# Patient Record
Sex: FEMALE | Race: WHITE | NOT HISPANIC OR LATINO | Employment: OTHER | URBAN - METROPOLITAN AREA
[De-identification: names, ages, dates, MRNs, and addresses within clinical notes are randomized per-mention and may not be internally consistent; named-entity substitution may affect disease eponyms.]

---

## 2020-05-07 ENCOUNTER — HOSPITAL ENCOUNTER (INPATIENT)
Facility: MEDICAL CENTER | Age: 80
LOS: 5 days | DRG: 247 | End: 2020-05-12
Attending: INTERNAL MEDICINE | Admitting: INTERNAL MEDICINE
Payer: MEDICARE

## 2020-05-07 ENCOUNTER — HOSPITAL ENCOUNTER (OUTPATIENT)
Facility: MEDICAL CENTER | Age: 80
DRG: 247 | End: 2020-05-07
Payer: MEDICARE

## 2020-05-07 DIAGNOSIS — I24.9 ACS (ACUTE CORONARY SYNDROME) (HCC): ICD-10-CM

## 2020-05-07 DIAGNOSIS — I10 ESSENTIAL HYPERTENSION: ICD-10-CM

## 2020-05-07 PROBLEM — E03.9 ACQUIRED HYPOTHYROIDISM: Status: ACTIVE | Noted: 2020-05-07

## 2020-05-07 PROCEDURE — 99223 1ST HOSP IP/OBS HIGH 75: CPT | Mod: AI | Performed by: HOSPITALIST

## 2020-05-07 PROCEDURE — 99233 SBSQ HOSP IP/OBS HIGH 50: CPT | Performed by: INTERNAL MEDICINE

## 2020-05-07 PROCEDURE — 700102 HCHG RX REV CODE 250 W/ 637 OVERRIDE(OP): Performed by: HOSPITALIST

## 2020-05-07 PROCEDURE — A9270 NON-COVERED ITEM OR SERVICE: HCPCS | Performed by: HOSPITALIST

## 2020-05-07 PROCEDURE — 770020 HCHG ROOM/CARE - TELE (206)

## 2020-05-07 RX ORDER — OXYCODONE HYDROCHLORIDE 5 MG/1
5-10 TABLET ORAL EVERY 4 HOURS PRN
Status: DISCONTINUED | OUTPATIENT
Start: 2020-05-07 | End: 2020-05-12 | Stop reason: HOSPADM

## 2020-05-07 RX ORDER — CARVEDILOL 3.12 MG/1
3.12 TABLET ORAL 2 TIMES DAILY WITH MEALS
Status: DISCONTINUED | OUTPATIENT
Start: 2020-05-07 | End: 2020-05-08

## 2020-05-07 RX ORDER — ATORVASTATIN CALCIUM 80 MG/1
80 TABLET, FILM COATED ORAL NIGHTLY
Status: DISCONTINUED | OUTPATIENT
Start: 2020-05-07 | End: 2020-05-12 | Stop reason: HOSPADM

## 2020-05-07 RX ORDER — BISACODYL 10 MG
10 SUPPOSITORY, RECTAL RECTAL
Status: DISCONTINUED | OUTPATIENT
Start: 2020-05-07 | End: 2020-05-12 | Stop reason: HOSPADM

## 2020-05-07 RX ORDER — ACETAMINOPHEN 325 MG/1
650 TABLET ORAL EVERY 6 HOURS PRN
Status: DISCONTINUED | OUTPATIENT
Start: 2020-05-07 | End: 2020-05-07

## 2020-05-07 RX ORDER — CLONIDINE HYDROCHLORIDE 0.1 MG/1
0.1 TABLET ORAL 2 TIMES DAILY
Status: ON HOLD | COMMUNITY
End: 2020-05-09

## 2020-05-07 RX ORDER — ASPIRIN 325 MG
325 TABLET ORAL DAILY
Status: DISCONTINUED | OUTPATIENT
Start: 2020-05-08 | End: 2020-05-07

## 2020-05-07 RX ORDER — AMOXICILLIN 250 MG
2 CAPSULE ORAL 2 TIMES DAILY
Status: DISCONTINUED | OUTPATIENT
Start: 2020-05-07 | End: 2020-05-12 | Stop reason: HOSPADM

## 2020-05-07 RX ORDER — CLOPIDOGREL BISULFATE 75 MG/1
75 TABLET ORAL DAILY
Status: DISCONTINUED | OUTPATIENT
Start: 2020-05-08 | End: 2020-05-08

## 2020-05-07 RX ORDER — ACETAMINOPHEN 325 MG/1
650 TABLET ORAL EVERY 6 HOURS PRN
Status: DISCONTINUED | OUTPATIENT
Start: 2020-05-07 | End: 2020-05-12 | Stop reason: HOSPADM

## 2020-05-07 RX ORDER — POLYETHYLENE GLYCOL 3350 17 G/17G
1 POWDER, FOR SOLUTION ORAL
Status: DISCONTINUED | OUTPATIENT
Start: 2020-05-07 | End: 2020-05-12 | Stop reason: HOSPADM

## 2020-05-07 RX ORDER — LEVOTHYROXINE SODIUM 0.07 MG/1
75 TABLET ORAL
Status: DISCONTINUED | OUTPATIENT
Start: 2020-05-08 | End: 2020-05-12 | Stop reason: HOSPADM

## 2020-05-07 RX ORDER — ACETAMINOPHEN 325 MG/1
650 TABLET ORAL EVERY 6 HOURS PRN
Status: CANCELLED | OUTPATIENT
Start: 2020-05-07

## 2020-05-07 RX ADMIN — CARVEDILOL 3.12 MG: 3.12 TABLET, FILM COATED ORAL at 17:57

## 2020-05-07 RX ADMIN — ATORVASTATIN CALCIUM 80 MG: 80 TABLET, FILM COATED ORAL at 19:37

## 2020-05-07 ASSESSMENT — COGNITIVE AND FUNCTIONAL STATUS - GENERAL
DRESSING REGULAR LOWER BODY CLOTHING: A LOT
STANDING UP FROM CHAIR USING ARMS: A LOT
SUGGESTED CMS G CODE MODIFIER MOBILITY: CL
HELP NEEDED FOR BATHING: A LOT
SUGGESTED CMS G CODE MODIFIER DAILY ACTIVITY: CL
TURNING FROM BACK TO SIDE WHILE IN FLAT BAD: A LOT
CLIMB 3 TO 5 STEPS WITH RAILING: A LOT
DRESSING REGULAR UPPER BODY CLOTHING: A LOT
TOILETING: A LOT
PERSONAL GROOMING: A LOT
MOVING TO AND FROM BED TO CHAIR: A LOT
MOVING FROM LYING ON BACK TO SITTING ON SIDE OF FLAT BED: A LOT
WALKING IN HOSPITAL ROOM: A LOT
DAILY ACTIVITIY SCORE: 12
MOBILITY SCORE: 12
EATING MEALS: A LOT

## 2020-05-07 ASSESSMENT — LIFESTYLE VARIABLES
EVER_SMOKED: NEVER
EVER HAD A DRINK FIRST THING IN THE MORNING TO STEADY YOUR NERVES TO GET RID OF A HANGOVER: NO
EVER FELT BAD OR GUILTY ABOUT YOUR DRINKING: NO
HAVE YOU EVER FELT YOU SHOULD CUT DOWN ON YOUR DRINKING: NO
TOTAL SCORE: 0
HAVE PEOPLE ANNOYED YOU BY CRITICIZING YOUR DRINKING: NO
TOTAL SCORE: 0
AVERAGE NUMBER OF DAYS PER WEEK YOU HAVE A DRINK CONTAINING ALCOHOL: 0
ALCOHOL_USE: NO
HOW MANY TIMES IN THE PAST YEAR HAVE YOU HAD 5 OR MORE DRINKS IN A DAY: 0
ON A TYPICAL DAY WHEN YOU DRINK ALCOHOL HOW MANY DRINKS DO YOU HAVE: 0
CONSUMPTION TOTAL: NEGATIVE
DOES PATIENT WANT TO STOP DRINKING: NO
TOTAL SCORE: 0

## 2020-05-07 ASSESSMENT — ENCOUNTER SYMPTOMS
HEADACHES: 0
PALPITATIONS: 0
VOMITING: 0
GASTROINTESTINAL NEGATIVE: 1
CHILLS: 0
COUGH: 0
NAUSEA: 0
ABDOMINAL PAIN: 0
NEUROLOGICAL NEGATIVE: 1
EYES NEGATIVE: 1
CONSTITUTIONAL NEGATIVE: 1
SHORTNESS OF BREATH: 0
SHORTNESS OF BREATH: 1
WEAKNESS: 0
MYALGIAS: 0
DIZZINESS: 0
BRUISES/BLEEDS EASILY: 0
MUSCULOSKELETAL NEGATIVE: 1
NERVOUS/ANXIOUS: 0
RESPIRATORY NEGATIVE: 1
PSYCHIATRIC NEGATIVE: 1
FEVER: 0

## 2020-05-07 ASSESSMENT — PATIENT HEALTH QUESTIONNAIRE - PHQ9
1. LITTLE INTEREST OR PLEASURE IN DOING THINGS: NOT AT ALL
2. FEELING DOWN, DEPRESSED, IRRITABLE, OR HOPELESS: NOT AT ALL
SUM OF ALL RESPONSES TO PHQ9 QUESTIONS 1 AND 2: 0

## 2020-05-07 NOTE — PROGRESS NOTES
2 RN skin check completed with ALIVIA Gomez.  Devices in place: oxygen 2L NC.   Skin assessed under devices: red, blanching.  Confirmed pressure ulcers found on: n/a.  New potential pressure ulcers noted: sacrum is red but blanching; mepilex applied.   Wound consult placed: n/a.  The following interventions in place: n/a.     Skin is dry; no other skin issues noted.

## 2020-05-07 NOTE — PROGRESS NOTES
TRIAGE OFFICER DIRECT ADMIT ACCEPTANCE NOTE:    -I spoke with the transferring physician, Dr. Lombard of [facility].  -This is an 79 y.o female, [facility] SOB and chest tightness and angina, with findings of new congestive heart failure, and elevated troponin which peaked at 3.  She also had mild renal insufficiency (GFR 50s). No signs of STEMI, with only minimal ST-T wave changes. BNP elevated in the 1000s. CXR showed cardiomegaly and pulmonary edema. Echo showed akinetic apex, with EF of 45%. She was diuresed, along with goal directed medical therapy. She was started on aspirin and plavix, statin, and heparin drip. Cardiology has been consulted (Dr. Jiang), who agreed to the transfer, and plans to bring her to the cath lab tomorrow.   -Please call cardiology to inform them of patient's arrival.  -Please call admitting hospitalist ON-CALL for full H&P and admission orders, on patient's arrival to the unit.

## 2020-05-07 NOTE — CONSULTS
Cardiology Initial Consultation    Date of Service  5/7/2020    Referring Physician  James Younger M.D.    Reason for Consultation  Transfer from [facility] for chest pain, cardiac catheterization.    History of Presenting Illness  Sena Boyce is a 79 y.o. female with a past medical history of 79 year old female with history of dyslipidemia who presented 5/7/2020 as a transfer for cardiac catheterization. She was well until 2 days ago when she began to experience chest pain. She describes her chest pain to be chest and neck burning sensation  She denies associated shortness of breath, palpitations, diaphoresis, nausea and vomiting. Her pain recurred last night on medical therapy and she was transferred to our facility.      Review of Systems  Review of Systems   Constitutional: Negative.  Negative for chills and fever.   HENT: Negative.  Negative for hearing loss.    Eyes: Negative.    Respiratory: Negative.  Negative for cough and shortness of breath.    Cardiovascular: Positive for chest pain. Negative for palpitations and leg swelling.   Gastrointestinal: Negative.  Negative for abdominal pain, nausea and vomiting.   Genitourinary: Negative.  Negative for dysuria and urgency.   Musculoskeletal: Negative.  Negative for myalgias.   Skin: Negative.  Negative for rash.   Neurological: Negative.  Negative for dizziness, weakness and headaches.   Hematological: Does not bruise/bleed easily.   Psychiatric/Behavioral: Negative.  The patient is not nervous/anxious.        Past Medical History   has a past medical history of Gout, Heart attack, High blood pressure, History of cholecystectomy, Hypercholesteremia, Hypothyroidism, and Polio.    Surgical History   has a past surgical history that includes hysterectomy robotic (5/1/2015) and oophorectomy (5/1/2015).    Family History  family history is not on file.    Social History       Medications  Prior to Admission Medications   Prescriptions Last Dose  Informant Patient Reported? Taking?   Non Formulary Request   Yes No   Sig: Take 1 tablet by mouth. Indications: Tart cherry   Non Formulary Request   Yes No   Sig: Take 1 tablet by mouth. Indications: Vit Code   atorvastatin (LIPITOR) 20 MG TABS   Yes No   Sig: Take 80 mg by mouth every evening.   carvedilol (COREG) 3.125 MG TABS   Yes No   Sig: Take 3.125 mg by mouth 2 times a day, with meals.   levothyroxine (SYNTHROID) 75 MCG TABS   Yes No   Sig: Take 75 mcg by mouth Every morning on an empty stomach.   metoclopramide (REGLAN) 10 MG TABS   No No   Sig: Take 1 Tab by mouth every 6 hours as needed (nausea/emesis).   oxycodone-acetaminophen (PERCOCET) 7.5-325 MG per tablet   Yes No   Sig: Take 1 Tab by mouth every four hours as needed.   oxycodone-acetaminophen (PERCOCET) 7.5-325 MG per tablet   No No   Sig: Take 1-2 Tabs by mouth every 6 hours as needed for Severe Pain (pain).   tramadol (ULTRAM) 50 MG TABS   Yes No   Sig: Take  mg by mouth every four hours as needed.      Facility-Administered Medications: None   (Medications reviewed.)    Allergies  Allergies   Allergen Reactions   • Allopurinol      Rash, swollen   • Indocin [Indomethacin]      erythema       Vital signs in last 24 hours  Temp:  [36.4 °C (97.6 °F)] 36.4 °C (97.6 °F)  Pulse:  [54] 54  Resp:  [18] 18  BP: (120)/(65) 120/65  SpO2:  [97 %] 97 %    Physical Exam  Physical Exam  Constitutional:       Appearance: She is well-developed.   HENT:      Head: Normocephalic and atraumatic.   Eyes:      Conjunctiva/sclera: Conjunctivae normal.      Pupils: Pupils are equal, round, and reactive to light.   Neck:      Musculoskeletal: Normal range of motion and neck supple.   Musculoskeletal: Normal range of motion.   Skin:     General: Skin is warm and dry.   Neurological:      Mental Status: She is alert and oriented to person, place, and time.     (Phyical Examination is limited due to COVID precaution)    Lab Review  Lab Results   Component Value  Date/Time    WBC 7.9 05/03/2015 03:46 AM    RBC 3.06 (L) 05/03/2015 03:46 AM    HEMOGLOBIN 8.9 (L) 05/03/2015 03:46 AM    HEMATOCRIT 27.7 (L) 05/03/2015 03:46 AM    MCV 90.5 05/03/2015 03:46 AM    MCH 29.1 05/03/2015 03:46 AM    MCHC 32.1 (L) 05/03/2015 03:46 AM    MPV 9.7 05/03/2015 03:46 AM      Lab Results   Component Value Date/Time    SODIUM 137 04/28/2015 06:10 PM    POTASSIUM 3.6 04/28/2015 06:10 PM    CHLORIDE 106 04/28/2015 06:10 PM    CO2 23 04/28/2015 06:10 PM    GLUCOSE 113 (H) 04/28/2015 06:10 PM    BUN 19 04/28/2015 06:10 PM    CREATININE 0.98 04/28/2015 06:10 PM      Lab Results   Component Value Date/Time    ASTSGOT 116 (H) 04/28/2015 06:10 PM    ALTSGPT 103 (H) 04/28/2015 06:10 PM     No results found for: CHOLSTRLTOT, LDL, HDL, TRIGLYCERIDE, TROPONINT    No results for input(s): NTPROBNP in the last 72 hours.    Cardiac Imaging and Procedures Review    Assessment/Plan  1. Chest pain: She is experiencing chest pain as described.  She was transferred from Dr. Lombard, Timothy at [facility] for cardiac catheterization. We will schedule for this procedure for tomorrow.  She understands the risks and benefits and agrees with plan.  2. Hyperlipidemia: She is doing well on statin therapy without myalgia symptoms.    The risks, benefits, and alternatives to coronary angiography with IV sedation were discussed in great detail. Specific risks mentioned include bleeding, infection, kidney damage, allergic reaction, cardiac perforation with possible tamponade requiring sue-cardiocentesis or possible open heart surgery. In addition, we discussed that 10% of patients will experience small to moderate bruising at the side of the arterial puncture. Lastly the risks of heart attack, stroke, and death were discussed; the risks of major complications such as heart attack or stroke caused by the angiogram is less than 1%; the risk of death is approximately 1 in 1000. The patient verbalized understanding of  these potential complications and wishes to proceed with this procedure.    Thank you for allowing me to participate in the care of this patient.    I will continue to follow this patient    Please contact me with any questions.    Anthony Jiang M.D.   Cardiologist, [facility]  [phone]

## 2020-05-07 NOTE — PROGRESS NOTES
Received inpatient to inpatient transfer request from [facility]..  Sending Physician:  Dr Lombard  Specialist Consulted: Dr Jiang- Cardiology  Diagnosis: Heart Failure  Patient Accepted  by Dr Younger   Nursing to notify the on call direct admit hospitalist when the patient arrives on the unit.

## 2020-05-07 NOTE — PROGRESS NOTES
Patient arrived via [facility]; oriented to room and initial assessment completed. Fall precautions in place, bed alarm on. Plan of care discussed with patient and all questions answered. Admitting MD paged. Tele box on patient; SB with 1st degree heart block.

## 2020-05-08 ENCOUNTER — APPOINTMENT (OUTPATIENT)
Dept: CARDIOLOGY | Facility: MEDICAL CENTER | Age: 80
DRG: 247 | End: 2020-05-08
Attending: NURSE PRACTITIONER
Payer: MEDICARE

## 2020-05-08 LAB
ACT BLD: 252 SEC (ref 74–137)
ACT BLD: 263 SEC (ref 74–137)
ACT BLD: 417 SEC (ref 74–137)
ALBUMIN SERPL BCP-MCNC: 4 G/DL (ref 3.2–4.9)
ALBUMIN/GLOB SERPL: 1.4 G/DL
ALP SERPL-CCNC: 75 U/L (ref 30–99)
ALT SERPL-CCNC: 11 U/L (ref 2–50)
ANION GAP SERPL CALC-SCNC: 11 MMOL/L (ref 7–16)
AST SERPL-CCNC: 20 U/L (ref 12–45)
BILIRUB SERPL-MCNC: 1.4 MG/DL (ref 0.1–1.5)
BUN SERPL-MCNC: 28 MG/DL (ref 8–22)
CALCIUM SERPL-MCNC: 8.9 MG/DL (ref 8.5–10.5)
CHLORIDE SERPL-SCNC: 100 MMOL/L (ref 96–112)
CHOLEST SERPL-MCNC: 187 MG/DL (ref 100–199)
CO2 SERPL-SCNC: 30 MMOL/L (ref 20–33)
CREAT SERPL-MCNC: 1.19 MG/DL (ref 0.5–1.4)
ERYTHROCYTE [DISTWIDTH] IN BLOOD BY AUTOMATED COUNT: 50.4 FL (ref 35.9–50)
GLOBULIN SER CALC-MCNC: 2.8 G/DL (ref 1.9–3.5)
GLUCOSE SERPL-MCNC: 111 MG/DL (ref 65–99)
HCT VFR BLD AUTO: 42.2 % (ref 37–47)
HDLC SERPL-MCNC: 44 MG/DL
HGB BLD-MCNC: 13.5 G/DL (ref 12–16)
INR BLD: 1
LDLC SERPL CALC-MCNC: 112 MG/DL
LV EJECT FRACT  99904: 65
LV EJECT FRACT MOD 2C 99903: 54.48
LV EJECT FRACT MOD 4C 99902: 45.79
LV EJECT FRACT MOD BP 99901: 49.49
MCH RBC QN AUTO: 30.8 PG (ref 27–33)
MCHC RBC AUTO-ENTMCNC: 32 G/DL (ref 33.6–35)
MCV RBC AUTO: 96.3 FL (ref 81.4–97.8)
PLATELET # BLD AUTO: 209 K/UL (ref 164–446)
PMV BLD AUTO: 10.6 FL (ref 9–12.9)
POTASSIUM SERPL-SCNC: 3.9 MMOL/L (ref 3.6–5.5)
PROT SERPL-MCNC: 6.8 G/DL (ref 6–8.2)
RBC # BLD AUTO: 4.38 M/UL (ref 4.2–5.4)
SODIUM SERPL-SCNC: 141 MMOL/L (ref 135–145)
TRIGL SERPL-MCNC: 155 MG/DL (ref 0–149)
TROPONIN T SERPL-MCNC: 304 NG/L (ref 6–19)
WBC # BLD AUTO: 6.9 K/UL (ref 4.8–10.8)

## 2020-05-08 PROCEDURE — 700102 HCHG RX REV CODE 250 W/ 637 OVERRIDE(OP): Performed by: HOSPITALIST

## 2020-05-08 PROCEDURE — 85027 COMPLETE CBC AUTOMATED: CPT

## 2020-05-08 PROCEDURE — 700111 HCHG RX REV CODE 636 W/ 250 OVERRIDE (IP): Performed by: FAMILY MEDICINE

## 2020-05-08 PROCEDURE — 80053 COMPREHEN METABOLIC PANEL: CPT

## 2020-05-08 PROCEDURE — 99152 MOD SED SAME PHYS/QHP 5/>YRS: CPT | Performed by: INTERNAL MEDICINE

## 2020-05-08 PROCEDURE — 0270356 DILATION OF CORONARY ARTERY, ONE ARTERY, BIFURCATION, WITH TWO DRUG-ELUTING INTRALUMINAL DEVICES, PERCUTANEOUS APPROACH: ICD-10-PCS | Performed by: INTERNAL MEDICINE

## 2020-05-08 PROCEDURE — A9270 NON-COVERED ITEM OR SERVICE: HCPCS | Performed by: INTERNAL MEDICINE

## 2020-05-08 PROCEDURE — A9270 NON-COVERED ITEM OR SERVICE: HCPCS

## 2020-05-08 PROCEDURE — 700102 HCHG RX REV CODE 250 W/ 637 OVERRIDE(OP): Performed by: INTERNAL MEDICINE

## 2020-05-08 PROCEDURE — 85610 PROTHROMBIN TIME: CPT

## 2020-05-08 PROCEDURE — 93306 TTE W/DOPPLER COMPLETE: CPT | Mod: 26 | Performed by: INTERNAL MEDICINE

## 2020-05-08 PROCEDURE — B2111ZZ FLUOROSCOPY OF MULTIPLE CORONARY ARTERIES USING LOW OSMOLAR CONTRAST: ICD-10-PCS | Performed by: INTERNAL MEDICINE

## 2020-05-08 PROCEDURE — 93306 TTE W/DOPPLER COMPLETE: CPT

## 2020-05-08 PROCEDURE — 770020 HCHG ROOM/CARE - TELE (206)

## 2020-05-08 PROCEDURE — 92928 PRQ TCAT PLMT NTRAC ST 1 LES: CPT | Mod: 59,LC | Performed by: INTERNAL MEDICINE

## 2020-05-08 PROCEDURE — A9270 NON-COVERED ITEM OR SERVICE: HCPCS | Performed by: HOSPITALIST

## 2020-05-08 PROCEDURE — 700117 HCHG RX CONTRAST REV CODE 255: Performed by: INTERNAL MEDICINE

## 2020-05-08 PROCEDURE — 93454 CORONARY ARTERY ANGIO S&I: CPT | Mod: 26,59 | Performed by: INTERNAL MEDICINE

## 2020-05-08 PROCEDURE — 700102 HCHG RX REV CODE 250 W/ 637 OVERRIDE(OP)

## 2020-05-08 PROCEDURE — 84484 ASSAY OF TROPONIN QUANT: CPT

## 2020-05-08 PROCEDURE — 700105 HCHG RX REV CODE 258: Performed by: INTERNAL MEDICINE

## 2020-05-08 PROCEDURE — 700111 HCHG RX REV CODE 636 W/ 250 OVERRIDE (IP): Performed by: HOSPITALIST

## 2020-05-08 PROCEDURE — 700101 HCHG RX REV CODE 250

## 2020-05-08 PROCEDURE — 80061 LIPID PANEL: CPT

## 2020-05-08 PROCEDURE — 36415 COLL VENOUS BLD VENIPUNCTURE: CPT

## 2020-05-08 PROCEDURE — 99153 MOD SED SAME PHYS/QHP EA: CPT

## 2020-05-08 PROCEDURE — 93005 ELECTROCARDIOGRAM TRACING: CPT | Performed by: INTERNAL MEDICINE

## 2020-05-08 PROCEDURE — 99232 SBSQ HOSP IP/OBS MODERATE 35: CPT | Performed by: FAMILY MEDICINE

## 2020-05-08 PROCEDURE — 85347 COAGULATION TIME ACTIVATED: CPT

## 2020-05-08 PROCEDURE — 700111 HCHG RX REV CODE 636 W/ 250 OVERRIDE (IP)

## 2020-05-08 RX ORDER — HEPARIN SODIUM,PORCINE 1000/ML
VIAL (ML) INJECTION
Status: COMPLETED
Start: 2020-05-08 | End: 2020-05-08

## 2020-05-08 RX ORDER — CARVEDILOL 12.5 MG/1
12.5 TABLET ORAL 2 TIMES DAILY WITH MEALS
Status: DISCONTINUED | OUTPATIENT
Start: 2020-05-08 | End: 2020-05-12 | Stop reason: HOSPADM

## 2020-05-08 RX ORDER — SODIUM CHLORIDE 9 MG/ML
INJECTION, SOLUTION INTRAVENOUS CONTINUOUS
Status: DISPENSED | OUTPATIENT
Start: 2020-05-08 | End: 2020-05-08

## 2020-05-08 RX ORDER — HYDRALAZINE HYDROCHLORIDE 20 MG/ML
10 INJECTION INTRAMUSCULAR; INTRAVENOUS EVERY 6 HOURS PRN
Status: DISCONTINUED | OUTPATIENT
Start: 2020-05-08 | End: 2020-05-10

## 2020-05-08 RX ORDER — AMLODIPINE BESYLATE 10 MG/1
10 TABLET ORAL
Status: DISCONTINUED | OUTPATIENT
Start: 2020-05-08 | End: 2020-05-11

## 2020-05-08 RX ORDER — MIDAZOLAM HYDROCHLORIDE 1 MG/ML
INJECTION INTRAMUSCULAR; INTRAVENOUS
Status: COMPLETED
Start: 2020-05-08 | End: 2020-05-08

## 2020-05-08 RX ORDER — ONDANSETRON 2 MG/ML
4 INJECTION INTRAMUSCULAR; INTRAVENOUS EVERY 6 HOURS PRN
Status: DISCONTINUED | OUTPATIENT
Start: 2020-05-08 | End: 2020-05-12 | Stop reason: HOSPADM

## 2020-05-08 RX ORDER — VERAPAMIL HYDROCHLORIDE 2.5 MG/ML
INJECTION, SOLUTION INTRAVENOUS
Status: COMPLETED
Start: 2020-05-08 | End: 2020-05-08

## 2020-05-08 RX ORDER — HYDRALAZINE HYDROCHLORIDE 20 MG/ML
INJECTION INTRAMUSCULAR; INTRAVENOUS
Status: COMPLETED
Start: 2020-05-08 | End: 2020-05-08

## 2020-05-08 RX ORDER — LIDOCAINE HYDROCHLORIDE 20 MG/ML
INJECTION, SOLUTION INFILTRATION; PERINEURAL
Status: COMPLETED
Start: 2020-05-08 | End: 2020-05-08

## 2020-05-08 RX ORDER — HEPARIN SODIUM 200 [USP'U]/100ML
INJECTION, SOLUTION INTRAVENOUS
Status: COMPLETED
Start: 2020-05-08 | End: 2020-05-08

## 2020-05-08 RX ADMIN — VERAPAMIL HYDROCHLORIDE 2.5 MG: 2.5 INJECTION, SOLUTION INTRAVENOUS at 09:30

## 2020-05-08 RX ADMIN — SENNOSIDES AND DOCUSATE SODIUM 2 TABLET: 8.6; 5 TABLET ORAL at 05:23

## 2020-05-08 RX ADMIN — SODIUM CHLORIDE: 9 INJECTION, SOLUTION INTRAVENOUS at 11:12

## 2020-05-08 RX ADMIN — TICAGRELOR 90 MG: 90 TABLET ORAL at 20:40

## 2020-05-08 RX ADMIN — HEPARIN SODIUM 2000 UNITS: 200 INJECTION, SOLUTION INTRAVENOUS at 09:30

## 2020-05-08 RX ADMIN — ASPIRIN 81 MG: 81 TABLET, COATED ORAL at 05:22

## 2020-05-08 RX ADMIN — MIDAZOLAM HYDROCHLORIDE 1.5 MG: 1 INJECTION, SOLUTION INTRAMUSCULAR; INTRAVENOUS at 09:15

## 2020-05-08 RX ADMIN — NITROGLYCERIN 10 ML: 20 INJECTION INTRAVENOUS at 09:30

## 2020-05-08 RX ADMIN — AMLODIPINE BESYLATE 10 MG: 10 TABLET ORAL at 11:11

## 2020-05-08 RX ADMIN — TICAGRELOR 180 MG: 90 TABLET ORAL at 10:26

## 2020-05-08 RX ADMIN — HEPARIN SODIUM: 1000 INJECTION, SOLUTION INTRAVENOUS; SUBCUTANEOUS at 09:30

## 2020-05-08 RX ADMIN — CARVEDILOL 12.5 MG: 12.5 TABLET, FILM COATED ORAL at 16:11

## 2020-05-08 RX ADMIN — LEVOTHYROXINE SODIUM 75 MCG: 75 TABLET ORAL at 05:22

## 2020-05-08 RX ADMIN — ENOXAPARIN SODIUM 40 MG: 100 INJECTION SUBCUTANEOUS at 05:22

## 2020-05-08 RX ADMIN — LIDOCAINE HYDROCHLORIDE: 20 INJECTION, SOLUTION INFILTRATION; PERINEURAL at 09:30

## 2020-05-08 RX ADMIN — CLOPIDOGREL BISULFATE 75 MG: 75 TABLET ORAL at 05:22

## 2020-05-08 RX ADMIN — ATORVASTATIN CALCIUM 80 MG: 80 TABLET, FILM COATED ORAL at 20:40

## 2020-05-08 RX ADMIN — IOHEXOL 146 ML: 350 INJECTION, SOLUTION INTRAVENOUS at 10:32

## 2020-05-08 RX ADMIN — HEPARIN SODIUM: 1000 INJECTION, SOLUTION INTRAVENOUS; SUBCUTANEOUS at 09:51

## 2020-05-08 RX ADMIN — ONDANSETRON 4 MG: 2 INJECTION INTRAMUSCULAR; INTRAVENOUS at 16:11

## 2020-05-08 RX ADMIN — FENTANYL CITRATE 50 MCG: 0.05 INJECTION, SOLUTION INTRAMUSCULAR; INTRAVENOUS at 10:31

## 2020-05-08 RX ADMIN — HYDRALAZINE HYDROCHLORIDE 10 MG: 20 INJECTION INTRAMUSCULAR; INTRAVENOUS at 10:30

## 2020-05-08 ASSESSMENT — ENCOUNTER SYMPTOMS
DIAPHORESIS: 0
TINGLING: 0
NAUSEA: 0
HEADACHES: 0
PALPITATIONS: 0
DOUBLE VISION: 0
BLURRED VISION: 0
VOMITING: 0
MYALGIAS: 0
NECK PAIN: 0
HEARTBURN: 0
ORTHOPNEA: 0
DIZZINESS: 0
BACK PAIN: 0
FEVER: 0
COUGH: 0
CHILLS: 0
SPUTUM PRODUCTION: 0
PHOTOPHOBIA: 0
HEMOPTYSIS: 0

## 2020-05-08 ASSESSMENT — FIBROSIS 4 INDEX
FIB4 SCORE: 2.31

## 2020-05-08 NOTE — PROCEDURES
Cardiac Catheterization and Percutaneous Intervention Procedure Report    2020    Referring MD:     Primary Care Provider: Sandie Flores M.D.    Indication for procedure: ACS >24 hours    Procedures:  · Insertion of 5/6 FR sheath in the right radial artery  · right and left coronary arteriograms  · Angioplasty and placement of a 3.0 by 38 mm [place] drug-eluting stent in mid  left anterior descending coronary artery.  · Angioplasty and placement of a 3.5 by 12 mm Mikal drug eluting stent in proximal  left anterior descending coronary artery  · Angioplasty and placement of a 2.5 by 8 mm [place] drug eluting stent in proximal  left posterolateral coronary artery.    Final diagnosis:   two vessel coronary artery disease.  Successful percutaneous intervention of left anterior descending and left circumflex coronary artery.    Recommendations: Guideline directed medical therapy and risk factor management    Coronary arteriograms:  Left main: normal  Left anterior descendin% diffuse tandem lesions, long segment proximal to distal LAD. Three major diagonal branches are noted.   Left circumflex: Dominant large left circumflex artery, supplies left posterolateral and posterior descending arteries. Large left posterolateral branch has 99% stenosis in proximal portion.  Right coronary: Small non-dominant RCA, chronically occluded in mid portion,    Procedure details:  Sena Boyce was brought to the cardiac catheterization lab where the right wrist was prepped and draped in the usual manner for cardiac catheterization.  The area was anesthetized with lidocaine and a 5/6 FR sheath was inserted into the right radial artery without difficulty. A #3.5 left Perry catheter was advanced to the ostia of the Left coronary artery and arteriograms were recorded.   A #4 right Perry catheter was advanced to the ostia of the right coronary artery and arteriograms were recorded.  Patient underwent percutaneous coronary  "revascularization as outlined below.  At the completion of the case the sheath was removed and hemostasis achieved utilizing a radial compression band .  Patient was pain-free and hemodynamically stable at the completion of the case.  There were no apparent complications.    Interventional Procedure LAD:     Given the patient's clinical presentation and coronary anatomy, PCI was indicated and we proceeded with the intervention as detailed below.    Indication for PCI:  NSTE- ACD    Pre: 90 %, 45 mm length, EVENS 2 flow  Post: 0%, EVENS 3 flow    Lesion complexity  High  Severe calcification No  Bifurcation  yes    Guide catheter: EBU 3.5 was advanced to the ostia of the left coronary artery.    Guide wire: A 0.014\" mm  Runthrough was advanced into the artery and crossed the lesion.    Balloon pre-dilatation: 2.0 by 20 mm Emerge inflated to 16 SABRINA to pre-dilate the lesion. Then dilated with 2.5X20 mm NC balloon at 16 atms.    Stent: A 3.0 by 38 mm [place] drug-eluting stent was deployed in mid  left anterior descending coronary artery at 12 SABRINA.    Additional stent: A 3.5 by 12 mm Mikal drug-eluting stent was deployed in proximal  left anterior descending coronary artery at 12 SABRINA.    Interventional Procedure posterolateral branch of LCX:     Given the patient's clinical presentation and coronary anatomy, PCI was indicated and we proceeded with the intervention as detailed below.    Pre: 99 %, 5 mm length, EVENS 3 flow  Post: 0%, EVENS 3 flow    Lesion complexity  Non-High  Severe calcification No  Bifurcation  No    Guide catheter: EBU 3.5 was advanced to the ostia of the left coronary artery.    Guide wire: A 0.014\" mm  Runthrough was advanced into the artery and crossed the lesion.    Stent: A 2.5 by 8 mm [place] drug-eluting stent was deployed in proximal  Left posterolateral coronary artery at 14 SABRINA.      Anticoagulant: Heparin  Antiplatelet: Ticagrelor  EBL <25 cc  Complications: none  Specimens: none  Contrast: " 146  Fluorotime : see cath lab flowsheet      Sedation: I supervised moderate sedation over a trained independent observer.    Sedation start time:09:18   End time: 10:17      Electronically signed by   Russell Olivas M.D., JACOB  Interventional cardiologist  5/8/2020  10:27 AM

## 2020-05-08 NOTE — H&P
"Hospital Medicine History & Physical Note    Date of Service  5/7/2020    Primary Care Physician  Sandie Flores M.D.    Code Status  Full Code    Chief Complaint  Chest pain    History of Presenting Illness  79 y.o. female who presented 5/7/2020 with chest pain.  Ms. Boyce has a past medical history of previous myocardial infarction secondary to severe anemia from acute blood loss due to a gynecologic malignancy though never had a heart catheterization.  She states she was in her usual state of health though \"I have been really tired\" for the past few weeks.  About 2 weeks ago she states she had a \"burning\" in her upper chest that went up into her mouth and then had the same symptoms 2 days ago.  She called 911 is brought to [facility] and was monitored there.  She was given Plavix, statin, beta-blocker, aspirin and had echocardiogram revealing ejection fraction 46% with apical akinesis.  Her troponins were 0.26 on admission and then went up to 2.05 then to 3.65 and trended down.  Her chest x-ray revealed pulmonary edema and she was given IV Lasix.  She has been transferred here for heart catheterization.  I have reviewed the discharge summary from Dr. Sandie Flores MD.   Ms. Boyce is amenable to a heart cath in the morning.    Of note, patient has not had a cough, is only felt short of breath when she had her chest pain otherwise not had any other breathing problems, has not had a fever, has not had changes in her taste nor her smell and she denies fever and body aches.  She denies any contact with persons known to have COVID.    Review of Systems  Review of Systems   Constitutional: Negative for chills and fever.   Respiratory: Positive for shortness of breath.    Cardiovascular: Positive for chest pain.   All other systems reviewed and are negative.      Past Medical History   has a past medical history of Gout, Heart attack (HCC), High blood pressure, History of cholecystectomy, Hypercholesteremia, " Hypothyroidism, and Polio.    Surgical History   has a past surgical history that includes hysterectomy robotic (2015) and oophorectomy (2015).     Family History  Father  of a heart attack     Social History   reports that she has never smoked. She has never used smokeless tobacco. She reports previous alcohol use. She reports that she does not use drugs.she is . Her daughter is staying with her currently    Allergies  Allergies   Allergen Reactions   • Allopurinol      Rash, swollen       Medications  Prior to Admission Medications   Prescriptions Last Dose Informant Patient Reported? Taking?   Non Formulary Request   Yes No   Sig: Take 1 tablet by mouth. Indications: Tart cherry   Non Formulary Request   Yes No   Sig: Take 1 tablet by mouth. Indications: Vit Code   atorvastatin (LIPITOR) 20 MG TABS   Yes No   Sig: Take 80 mg by mouth every evening.   carvedilol (COREG) 3.125 MG TABS   Yes No   Sig: Take 3.125 mg by mouth 2 times a day, with meals.   cloNIDine (CATAPRES) 0.1 MG Tab   Yes Yes   Sig: Take 0.1 mg by mouth 2 times a day.   levothyroxine (SYNTHROID) 75 MCG TABS   Yes No   Sig: Take 75 mcg by mouth Every morning on an empty stomach.   metoclopramide (REGLAN) 10 MG TABS   No No   Sig: Take 1 Tab by mouth every 6 hours as needed (nausea/emesis).   oxycodone-acetaminophen (PERCOCET) 7.5-325 MG per tablet   Yes No   Sig: Take 1 Tab by mouth every four hours as needed.   oxycodone-acetaminophen (PERCOCET) 7.5-325 MG per tablet   No No   Sig: Take 1-2 Tabs by mouth every 6 hours as needed for Severe Pain (pain).   tramadol (ULTRAM) 50 MG TABS   Yes No   Sig: Take  mg by mouth every four hours as needed.      Facility-Administered Medications: None       Physical Exam  Temp:  [36.4 °C (97.6 °F)] 36.4 °C (97.6 °F)  Pulse:  [54] 54  Resp:  [18] 18  BP: (120)/(65) 120/65  SpO2:  [97 %] 97 %    Physical Exam  Vitals signs and nursing note reviewed.   Constitutional:       Appearance:  Normal appearance. She is obese.   HENT:      Head: Normocephalic and atraumatic.      Mouth/Throat:      Mouth: Mucous membranes are dry.      Pharynx: Oropharynx is clear.   Eyes:      General: No scleral icterus.     Conjunctiva/sclera: Conjunctivae normal.   Neck:      Musculoskeletal: Normal range of motion and neck supple.   Cardiovascular:      Rate and Rhythm: Normal rate and regular rhythm.      Comments: 3 out of 6 systolic murmur heard at the right upper sternal border  Pulmonary:      Effort: Pulmonary effort is normal. No respiratory distress.      Breath sounds: Normal breath sounds.   Abdominal:      General: There is no distension.      Tenderness: There is no abdominal tenderness.   Musculoskeletal:      Right lower leg: Edema present.      Left lower leg: Edema present.   Skin:     General: Skin is warm and dry.   Neurological:      General: No focal deficit present.      Mental Status: She is alert and oriented to person, place, and time.   Psychiatric:         Mood and Affect: Mood normal.         Behavior: Behavior normal.         Laboratory:          No results for input(s): ALTSGPT, ASTSGOT, ALKPHOSPHAT, TBILIRUBIN, DBILIRUBIN, GAMMAGT, AMYLASE, LIPASE, ALB, PREALBUMIN, GLUCOSE in the last 72 hours.      No results for input(s): NTPROBNP in the last 72 hours.      No results for input(s): TROPONINT in the last 72 hours.    Imaging:  No orders to display         Assessment/Plan:      * ACS (acute coronary syndrome) (HCC)- (present on admission)  Assessment & Plan  Patient has a known history of coronary disease and transferred from [facility] with a troponin of 3.65 without dynamic EKG changes.  Echocardiogram that revealed apical akinesis with ejection fraction 46%.  She was loaded with Plavix, aspirin, statin prior to transfer.  Continue her on a beta-blocker.  She is n.p.o. at midnight for heart catheterization in the morning  Continuous telemetry monitoring.  A troponin has been  ordered for the morning  Patient has made it very clear to me that she is amenable to heart catheterization but under no circumstances will she undergo open heart surgery.    Essential hypertension- (present on admission)  Assessment & Plan  Continue coreg  Follow blood pressure    Acquired hypothyroidism- (present on admission)  Assessment & Plan  Continue synthroid 75 mcg daily

## 2020-05-08 NOTE — PROGRESS NOTES
Pt transferred form 811-1 to 817. Monitor room notified, tele boxes switched.     Pt back from cath, R radial site clean, dry and intact.

## 2020-05-08 NOTE — CARE PLAN
Pt in bed resting, no s/s of distress. Bed in low locked position, side rails up x2, call light within reach, will cont to monitor.

## 2020-05-08 NOTE — PROGRESS NOTES
Bedside report received, bed locked and in low position. Call light within reach, hourly rounding in place. Pt anxious about cath lab procedure, this RN educated. No needs at this time.

## 2020-05-08 NOTE — CARE PLAN
Problem: Respiratory:  Goal: Respiratory status will improve  Outcome: PROGRESSING SLOWER THAN EXPECTED     Problem: Urinary Elimination:  Goal: Ability to reestablish a normal urinary elimination pattern will improve  Outcome: PROGRESSING SLOWER THAN EXPECTED     Problem: Mobility  Goal: Risk for activity intolerance will decrease  Outcome: PROGRESSING SLOWER THAN EXPECTED

## 2020-05-08 NOTE — PROGRESS NOTES
"Hospital Medicine Daily Progress Note    Date of Service  5/8/2020    Chief Complaint  79 y.o. female admitted 5/7/2020 with Chest pain    Hospital Course    79 y.o. female who presented 5/7/2020 with chest pain.  Ms. Boyce has a past medical history of previous myocardial infarction secondary to severe anemia from acute blood loss due to a gynecologic malignancy though never had a heart catheterization.  She states she was in her usual state of health though \"I have been really tired\" for the past few weeks.  About 2 weeks ago she states she had a \"burning\" in her upper chest that went up into her mouth and then had the same symptoms 2 days ago.  She called 911 is brought to [facility] and was monitored there.  She was given Plavix, statin, beta-blocker, aspirin and had echocardiogram revealing ejection fraction 46% with apical akinesis.  Her troponins were 0.26 on admission and then went up to 2.05 then to 3.65 and trended down.  Her chest x-ray revealed pulmonary edema and she was given IV Lasix.  She has been transferred here for heart catheterization.    Interval Problem Update  S/p  Cardiac cath    Consultants/Specialty  cardiology    Code Status  full    Disposition  pending    Review of Systems  Review of Systems   Constitutional: Negative for chills, diaphoresis and fever.   HENT: Negative for ear discharge, ear pain and tinnitus.    Eyes: Negative for blurred vision, double vision and photophobia.   Respiratory: Negative for cough, hemoptysis and sputum production.    Cardiovascular: Negative for chest pain, palpitations and orthopnea.   Gastrointestinal: Negative for heartburn, nausea and vomiting.   Genitourinary: Negative for dysuria, frequency and urgency.   Musculoskeletal: Negative for back pain, myalgias and neck pain.   Skin: Negative for itching and rash.   Neurological: Negative for dizziness, tingling and headaches.        Physical Exam  Temp:  [36.4 °C (97.6 °F)-37.3 °C (99.1 °F)] " 37.3 °C (99.1 °F)  Pulse:  [51-59] 59  Resp:  [16-18] 16  BP: (120-178)/(57-86) 159/86  SpO2:  [96 %-97 %] 96 %    Physical Exam  Constitutional:       Appearance: She is obese.   HENT:      Head: Normocephalic and atraumatic.      Nose: Nose normal.      Mouth/Throat:      Mouth: Mucous membranes are dry.   Eyes:      Extraocular Movements: Extraocular movements intact.      Pupils: Pupils are equal, round, and reactive to light.   Neck:      Musculoskeletal: Normal range of motion and neck supple.   Cardiovascular:      Rate and Rhythm: Normal rate and regular rhythm.      Pulses: Normal pulses.      Heart sounds: Normal heart sounds.   Pulmonary:      Effort: Pulmonary effort is normal.      Breath sounds: Normal breath sounds.   Abdominal:      Palpations: Abdomen is soft.   Musculoskeletal: Normal range of motion.   Skin:     General: Skin is warm and dry.   Neurological:      General: No focal deficit present.      Mental Status: She is alert and oriented to person, place, and time.         Fluids  No intake or output data in the 24 hours ending 05/08/20 1116    Laboratory  Recent Labs     05/08/20  0311   WBC 6.9   RBC 4.38   HEMOGLOBIN 13.5   HEMATOCRIT 42.2   MCV 96.3   MCH 30.8   MCHC 32.0*   RDW 50.4*   PLATELETCT 209   MPV 10.6     Recent Labs     05/08/20  0311   SODIUM 141   POTASSIUM 3.9   CHLORIDE 100   CO2 30   GLUCOSE 111*   BUN 28*   CREATININE 1.19   CALCIUM 8.9             Recent Labs     05/08/20  0311   TRIGLYCERIDE 155*   HDL 44   *       Imaging       Assessment/Plan  * ACS (acute coronary syndrome) (HCC)- (present on admission)  Assessment & Plan  · A/p cardiac cath :Insertion of 5/6 FR sheath in the right radial artery  · right and left coronary arteriograms  · Angioplasty and placement of a 3.0 by 38 mm [place] drug-eluting stent in mid  left anterior descending coronary artery.  · Angioplasty and placement of a 3.5 by 12 mm [place] drug eluting stent in proximal  left anterior  descending coronary artery  Angioplasty and placement of a 2.5 by 8 mm [place] drug eluting stent in proximal  left posterolateral coronary artery.    On asa  On brilinta  On lipitor    Essential hypertension- (present on admission)  Assessment & Plan  Continue coreg  Amlodipine  Will monitor    Acquired hypothyroidism- (present on admission)  Assessment & Plan  Continue synthroid 75 mcg daily       VTE prophylaxis: lovenox

## 2020-05-08 NOTE — PROGRESS NOTES
Patient has a diagnosis of Chest Pain. Notes say angiogram today. No cath report yet.     Below are the defect free care measures that must be met should patient be given an ACS diagnosis after angiogram.     It is strongly recommended that if the patient gets a stent in cath lab today, that up until 1700 today, the bedside RN start the meds to beds process so that discharge is not delayed. Please do not call the on call pharmacist overnight to start the process unless of course, an order is placed to discharge the patient overnight.     ACS Measures:    1. ASA prescribed at discharge  2. Beta blockade prescribed at discharge, if patient also has HFrEF (EF less than or equal to 40%), this needs to be one of the three evidence based beta blockers: carvedilol, bisoprolol, Toprol XL  3. High intensity statin prescribed at discharge (atorvastatin 40 mg or rosuvastatin 20 mg)  4. ACE-I or ARB prescribed on discharge for LVEF less than 40%  5. Aldosterone blockade prescribed for patients with EF less than 40% AND history of diabetes mellitus OR history of heart failure, heart failure on presentation or heart failure as an in-hospital event  6. ICR referral order is placed  7. Use the Acute Coronary Syndrome discharge instructions to document that patient has been provided with the contact information for ICR   8. Evaluation of LV systolic function can be by angiogram, or echo before discharge, or within past year, cannot be a future plan for LVSF assessment  9. ACS education is documented daily  10. For any patient who receives a stent, initiate meds to beds: Get the outpatient order for the script from the physician, or the APRN:  • Medication  • Dose  • Route  • Quantity, how many pills in the bottle, (ie for Plavix this would be 30, since it’s once daily; Brilinta would be 60 since it’s twice daily)  • Refills, most physicians and APRN’s give 11 refills because the patient usually needs to be on the med for one  year  • Weekend: Call [phone]: ask for the on-call Anticoagulation Pharmacist to be paged.   • Weekday: call [phone] (anticoagulation clinic)     What if any of the above ACS Measures are contraindicated?    · Request that the discharging provider document the medication/intervention and the contraindication specifically in a progress note  · For example: “no ACE-I meds due to hypotension” is not enough. It needs to say: “No ACE-I, ARNI, ARB due to hypotension”; “No Beta Blockade due to bradycardia”…

## 2020-05-08 NOTE — ASSESSMENT & PLAN NOTE
· A/p cardiac cath :Insertion of 5/6 FR sheath in the right radial artery  · right and left coronary arteriograms  · Angioplasty and placement of a 3.0 by 38 mm [place] drug-eluting stent in mid  left anterior descending coronary artery.  · Angioplasty and placement of a 3.5 by 12 mm [place] drug eluting stent in proximal  left anterior descending coronary artery  Angioplasty and placement of a 2.5 by 8 mm [place] drug eluting stent in proximal  left posterolateral coronary artery.    On asa  On brilinta  On lipitor

## 2020-05-08 NOTE — PROGRESS NOTES
Chart Check Complete    Monitor Summary:    Rhythm- SB   Rate- 52-68  Ectopy- na  Measurements- 0.20/0.08/0.58

## 2020-05-09 LAB
ALBUMIN SERPL BCP-MCNC: 3.3 G/DL (ref 3.2–4.9)
ALBUMIN/GLOB SERPL: 1.1 G/DL
ALP SERPL-CCNC: 74 U/L (ref 30–99)
ALT SERPL-CCNC: 14 U/L (ref 2–50)
ANION GAP SERPL CALC-SCNC: 10 MMOL/L (ref 7–16)
AST SERPL-CCNC: 35 U/L (ref 12–45)
BASOPHILS # BLD AUTO: 0.3 % (ref 0–1.8)
BASOPHILS # BLD: 0.03 K/UL (ref 0–0.12)
BILIRUB SERPL-MCNC: 1.8 MG/DL (ref 0.1–1.5)
BUN SERPL-MCNC: 23 MG/DL (ref 8–22)
CALCIUM SERPL-MCNC: 8.6 MG/DL (ref 8.5–10.5)
CHLORIDE SERPL-SCNC: 105 MMOL/L (ref 96–112)
CO2 SERPL-SCNC: 24 MMOL/L (ref 20–33)
CREAT SERPL-MCNC: 0.96 MG/DL (ref 0.5–1.4)
EKG IMPRESSION: NORMAL
EOSINOPHIL # BLD AUTO: 0.15 K/UL (ref 0–0.51)
EOSINOPHIL NFR BLD: 1.7 % (ref 0–6.9)
ERYTHROCYTE [DISTWIDTH] IN BLOOD BY AUTOMATED COUNT: 50.6 FL (ref 35.9–50)
GLOBULIN SER CALC-MCNC: 2.9 G/DL (ref 1.9–3.5)
GLUCOSE SERPL-MCNC: 117 MG/DL (ref 65–99)
HCT VFR BLD AUTO: 39.3 % (ref 37–47)
HGB BLD-MCNC: 12.7 G/DL (ref 12–16)
IMM GRANULOCYTES # BLD AUTO: 0.03 K/UL (ref 0–0.11)
IMM GRANULOCYTES NFR BLD AUTO: 0.3 % (ref 0–0.9)
LYMPHOCYTES # BLD AUTO: 1.01 K/UL (ref 1–4.8)
LYMPHOCYTES NFR BLD: 11.7 % (ref 22–41)
MCH RBC QN AUTO: 31.2 PG (ref 27–33)
MCHC RBC AUTO-ENTMCNC: 32.3 G/DL (ref 33.6–35)
MCV RBC AUTO: 96.6 FL (ref 81.4–97.8)
MONOCYTES # BLD AUTO: 1.18 K/UL (ref 0–0.85)
MONOCYTES NFR BLD AUTO: 13.7 % (ref 0–13.4)
NEUTROPHILS # BLD AUTO: 6.24 K/UL (ref 2–7.15)
NEUTROPHILS NFR BLD: 72.3 % (ref 44–72)
NRBC # BLD AUTO: 0 K/UL
NRBC BLD-RTO: 0 /100 WBC
PLATELET # BLD AUTO: 195 K/UL (ref 164–446)
PMV BLD AUTO: 10.9 FL (ref 9–12.9)
POTASSIUM SERPL-SCNC: 3.9 MMOL/L (ref 3.6–5.5)
PROT SERPL-MCNC: 6.2 G/DL (ref 6–8.2)
RBC # BLD AUTO: 4.07 M/UL (ref 4.2–5.4)
SODIUM SERPL-SCNC: 139 MMOL/L (ref 135–145)
WBC # BLD AUTO: 8.6 K/UL (ref 4.8–10.8)

## 2020-05-09 PROCEDURE — 36415 COLL VENOUS BLD VENIPUNCTURE: CPT

## 2020-05-09 PROCEDURE — 97162 PT EVAL MOD COMPLEX 30 MIN: CPT

## 2020-05-09 PROCEDURE — 99232 SBSQ HOSP IP/OBS MODERATE 35: CPT | Performed by: INTERNAL MEDICINE

## 2020-05-09 PROCEDURE — A9270 NON-COVERED ITEM OR SERVICE: HCPCS | Performed by: HOSPITALIST

## 2020-05-09 PROCEDURE — 80053 COMPREHEN METABOLIC PANEL: CPT

## 2020-05-09 PROCEDURE — 700111 HCHG RX REV CODE 636 W/ 250 OVERRIDE (IP): Performed by: HOSPITALIST

## 2020-05-09 PROCEDURE — 770020 HCHG ROOM/CARE - TELE (206)

## 2020-05-09 PROCEDURE — 700102 HCHG RX REV CODE 250 W/ 637 OVERRIDE(OP): Performed by: INTERNAL MEDICINE

## 2020-05-09 PROCEDURE — A9270 NON-COVERED ITEM OR SERVICE: HCPCS | Performed by: INTERNAL MEDICINE

## 2020-05-09 PROCEDURE — 99232 SBSQ HOSP IP/OBS MODERATE 35: CPT | Performed by: FAMILY MEDICINE

## 2020-05-09 PROCEDURE — 93010 ELECTROCARDIOGRAM REPORT: CPT | Performed by: INTERNAL MEDICINE

## 2020-05-09 PROCEDURE — 700102 HCHG RX REV CODE 250 W/ 637 OVERRIDE(OP): Performed by: HOSPITALIST

## 2020-05-09 PROCEDURE — 85025 COMPLETE CBC W/AUTO DIFF WBC: CPT

## 2020-05-09 RX ORDER — CARVEDILOL 12.5 MG/1
12.5 TABLET ORAL 2 TIMES DAILY WITH MEALS
Qty: 60 TAB | Refills: 0 | Status: ON HOLD | OUTPATIENT
Start: 2020-05-09 | End: 2020-05-26 | Stop reason: SDUPTHER

## 2020-05-09 RX ORDER — ASPIRIN 81 MG/1
81 TABLET ORAL DAILY
Qty: 30 TAB | Refills: 0 | Status: ON HOLD | OUTPATIENT
Start: 2020-05-10 | End: 2020-05-26

## 2020-05-09 RX ORDER — AMLODIPINE BESYLATE 10 MG/1
10 TABLET ORAL DAILY
Qty: 30 TAB | Refills: 0 | Status: ON HOLD | OUTPATIENT
Start: 2020-05-10 | End: 2020-05-26 | Stop reason: SDUPTHER

## 2020-05-09 RX ADMIN — CARVEDILOL 12.5 MG: 12.5 TABLET, FILM COATED ORAL at 04:38

## 2020-05-09 RX ADMIN — AMLODIPINE BESYLATE 10 MG: 10 TABLET ORAL at 04:35

## 2020-05-09 RX ADMIN — SENNOSIDES AND DOCUSATE SODIUM 2 TABLET: 8.6; 5 TABLET ORAL at 04:36

## 2020-05-09 RX ADMIN — ATORVASTATIN CALCIUM 80 MG: 80 TABLET, FILM COATED ORAL at 20:28

## 2020-05-09 RX ADMIN — SENNOSIDES AND DOCUSATE SODIUM 2 TABLET: 8.6; 5 TABLET ORAL at 18:08

## 2020-05-09 RX ADMIN — CARVEDILOL 12.5 MG: 12.5 TABLET, FILM COATED ORAL at 18:08

## 2020-05-09 RX ADMIN — ASPIRIN 81 MG: 81 TABLET, COATED ORAL at 04:36

## 2020-05-09 RX ADMIN — TICAGRELOR 90 MG: 90 TABLET ORAL at 18:08

## 2020-05-09 RX ADMIN — TICAGRELOR 90 MG: 90 TABLET ORAL at 04:37

## 2020-05-09 RX ADMIN — LEVOTHYROXINE SODIUM 75 MCG: 75 TABLET ORAL at 04:36

## 2020-05-09 RX ADMIN — ENOXAPARIN SODIUM 40 MG: 100 INJECTION SUBCUTANEOUS at 04:38

## 2020-05-09 ASSESSMENT — COGNITIVE AND FUNCTIONAL STATUS - GENERAL
SUGGESTED CMS G CODE MODIFIER MOBILITY: CM
WALKING IN HOSPITAL ROOM: TOTAL
MOBILITY SCORE: 9
MOVING FROM LYING ON BACK TO SITTING ON SIDE OF FLAT BED: UNABLE
MOVING TO AND FROM BED TO CHAIR: A LOT
CLIMB 3 TO 5 STEPS WITH RAILING: TOTAL
TURNING FROM BACK TO SIDE WHILE IN FLAT BAD: A LOT
STANDING UP FROM CHAIR USING ARMS: A LOT

## 2020-05-09 ASSESSMENT — ENCOUNTER SYMPTOMS
BRUISES/BLEEDS EASILY: 0
MYALGIAS: 0
NAUSEA: 0
CONSTITUTIONAL NEGATIVE: 1
COUGH: 0
ABDOMINAL PAIN: 0
CHILLS: 0
WEAKNESS: 0
PALPITATIONS: 0
VOMITING: 0
FEVER: 0
PSYCHIATRIC NEGATIVE: 1
CARDIOVASCULAR NEGATIVE: 1
NEUROLOGICAL NEGATIVE: 1
EYES NEGATIVE: 1
GASTROINTESTINAL NEGATIVE: 1
SHORTNESS OF BREATH: 1
DIZZINESS: 0
HEADACHES: 0
NERVOUS/ANXIOUS: 0
MUSCULOSKELETAL NEGATIVE: 1

## 2020-05-09 ASSESSMENT — GAIT ASSESSMENTS
DEVIATION: OTHER (COMMENT)
DISTANCE (FEET): 5
GAIT LEVEL OF ASSIST: MAXIMAL ASSIST
ASSISTIVE DEVICE: FRONT WHEEL WALKER

## 2020-05-09 ASSESSMENT — FIBROSIS 4 INDEX: FIB4 SCORE: 3.84

## 2020-05-09 NOTE — DISCHARGE PLANNING
"· RNCM notified by nurse practitioner that copay needs to be verified on Brilinta prior to pt discharging (today)  · RNCM called the [facility] in [place] and spoke to Layton  · Per Layton, Pt's copay is $30  · RNCM spoke to the Pt regarding above copay  · Pt states it is affordable for her  · While speaking with the Pt, Pt gets tearful and states that she is unable to get out of the bed and that she is worried about going home  · Pt had just been seen by PT, who was also informed of mobility worries  · RNCM discussed post-acute options with the Pt  · Pt states she will not go to a \"nursing home\"  · RNCM discussed criteria for IRF with Pt and Pt would be willing to go to Rehab  · RNCM discussed above with bedside RN   · Per bedside RN, MD will be paged and asked for physiatry consult and informed of mobility issues   "

## 2020-05-09 NOTE — DISCHARGE SUMMARY
"Discharge Summary    CHIEF COMPLAINT ON ADMISSION  No chief complaint on file.      Reason for Admission  Heart Failure     Admission Date  5/7/2020    CODE STATUS  Full Code    HPI & HOSPITAL COURSE  This is a 79 y.o. female here with  chest pain.  Ms. Boyce has a past medical history of previous myocardial infarction secondary to severe anemia from acute blood loss due to a gynecologic malignancy though never had a heart catheterization.  She states she was in her usual state of health though \"I have been really tired\" for the past few weeks.  About 2 weeks ago she states she had a \"burning\" in her upper chest that went up into her mouth and then had the same symptoms 2 days ago.  She called 911 is brought to [facility] and was monitored there.  She was given Plavix, statin, beta-blocker, aspirin and had echocardiogram revealing ejection fraction 46% with apical akinesis.  Her troponins were 0.26 on admission and then went up to 2.05 then to 3.65 and trended down.  Her chest x-ray revealed pulmonary edema and she was given IV Lasix.  She has been transferred here for heart catheterization.  I have reviewed the discharge summary from Dr. Sandie Flores MD.   Ms. Boyce is amenable to a heart cath in the morning.     Of note, patient has not had a cough, is only felt short of breath when she had her chest pain otherwise not had any other breathing problems, has not had a fever, has not had changes in her taste nor her smell and she denies fever and body aches.  She denies any contact with persons known to have COVID.  She was admitted with ACS  and was seen by cardiologist and had cardiac cath Procedures:  · Insertion of 5/6 FR sheath in the right radial artery  · right and left coronary arteriograms  · Angioplasty and placement of a 3.0 by 38 mm [place] drug-eluting stent in mid  left anterior descending coronary artery.  · Angioplasty and placement of a 3.5 by 12 mm [place] drug eluting stent in proximal  left " anterior descending coronary artery  Angioplasty and placement of a 2.5 by 8 mm [place] drug eluting stent in proximal  left posterolateral coronary artery.    She has done very well posy cath and she is cleared by cardiology for discharge.  She is on asa and brillianta. And her coreg is increased        Constitutional:       Appearance: She is obese.   HENT:      Head: Normocephalic and atraumatic.      Nose: Nose normal.      Mouth/Throat:      Mouth: Mucous membranes are dry.   Eyes:      Extraocular Movements: Extraocular movements intact.      Pupils: Pupils are equal, round, and reactive to light.   Neck:      Musculoskeletal: Normal range of motion and neck supple.   Cardiovascular:      Rate and Rhythm: Normal rate and regular rhythm.      Pulses: Normal pulses.      Heart sounds: Normal heart sounds.   Pulmonary:      Effort: Pulmonary effort is normal.      Breath sounds: Normal breath sounds.   Abdominal:      Palpations: Abdomen is soft.   Musculoskeletal: Normal range of motion.   Skin:     General: Skin is warm and dry.   Neurological:      General: No focal deficit present.      Mental Status: She is alert and oriented to person, place, and time.      ACS (acute coronary syndrome) (HCC)- (present on admission)  Assessment & Plan  · A/p cardiac cath :Insertion of 5/6 FR sheath in the right radial artery  · right and left coronary arteriograms  · Angioplasty and placement of a 3.0 by 38 mm [place] drug-eluting stent in mid  left anterior descending coronary artery.  · Angioplasty and placement of a 3.5 by 12 mm [place] drug eluting stent in proximal  left anterior descending coronary artery  Angioplasty and placement of a 2.5 by 8 mm [place] drug eluting stent in proximal  left posterolateral coronary artery.     On asa  On brilinta  On lipitor     Essential hypertension- (present on admission)  Assessment & Plan   coreg  Amlodipine       Acquired hypothyroidism- (present on admission)  Assessment & Plan  Continue  synthroid 75 mcg daily        Therefore, she is discharged in good and stable condition to home with close outpatient follow-up.    The patient met 2-midnight criteria for an inpatient stay at the time of discharge.    Discharge Date  5/9/20    FOLLOW UP ITEMS POST DISCHARGE  pcp next week  Cardiology in 1 to 2 weeks    DISCHARGE DIAGNOSES  Principal Problem:    ACS (acute coronary syndrome) (HCC) POA: Yes  Active Problems:    Acquired hypothyroidism POA: Yes    Essential hypertension POA: Yes  Resolved Problems:    * No resolved hospital problems. *      FOLLOW UP  No future appointments.  No follow-up provider specified.    MEDICATIONS ON DISCHARGE     Medication List      START taking these medications      Instructions   amLODIPine 10 MG Tabs  Start taking on:  May 10, 2020  Commonly known as:  NORVASC   Take 1 Tab by mouth every day.  Dose:  10 mg     aspirin 81 MG EC tablet  Start taking on:  May 10, 2020   Take 1 Tab by mouth every day.  Dose:  81 mg     ticagrelor 90 MG Tabs tablet  Commonly known as:  BRILINTA   Take 1 Tab by mouth 2 Times a Day.  Dose:  90 mg        CHANGE how you take these medications      Instructions   carvedilol 12.5 MG Tabs  What changed:    · medication strength  · how much to take  Commonly known as:  COREG   Take 1 Tab by mouth 2 times a day, with meals.  Dose:  12.5 mg        CONTINUE taking these medications      Instructions   atorvastatin 20 MG Tabs  Commonly known as:  LIPITOR   Take 80 mg by mouth every evening.  Dose:  80 mg     levothyroxine 75 MCG Tabs  Commonly known as:  SYNTHROID   Take 75 mcg by mouth Every morning on an empty stomach.  Dose:  75 mcg     metoclopramide 10 MG Tabs  Commonly known as:  REGLAN   Take 1 Tab by mouth every 6 hours as needed (nausea/emesis).  Dose:  10 mg     Non Formulary Request   Take 1 tablet by mouth. Indications: Tart cherry  Dose:  1 tablet     Non Formulary Request   Take 1 tablet by mouth. Indications: Vit Code  Dose:  1 tablet      * oxyCODONE-acetaminophen 7.5-325 MG per tablet  Commonly known as:  PERCOCET   Take 1 Tab by mouth every four hours as needed.  Dose:  1 Tab     * oxyCODONE-acetaminophen 7.5-325 MG per tablet  Commonly known as:  PERCOCET   Take 1-2 Tabs by mouth every 6 hours as needed for Severe Pain (pain).  Dose:  1-2 Tab     tramadol 50 MG Tabs  Commonly known as:  ULTRAM   Take  mg by mouth every four hours as needed.  Dose:   mg         * This list has 2 medication(s) that are the same as other medications prescribed for you. Read the directions carefully, and ask your doctor or other care provider to review them with you.            STOP taking these medications    cloNIDine 0.1 MG Tabs  Commonly known as:  CATAPRES            Allergies  Allergies   Allergen Reactions   • Allopurinol      Rash, swollen       DIET  Orders Placed This Encounter   Procedures   • Diet Order Cardiac     Standing Status:   Standing     Number of Occurrences:   1     Order Specific Question:   Diet:     Answer:   Cardiac [6]       ACTIVITY  As tolerated.  Weight bearing as tolerated    CONSULTATIONS  cardiology    PROCEDURES  · s/p cardiac cath :Insertion of 5/6 FR sheath in the right radial artery  · right and left coronary arteriograms  · Angioplasty and placement of a 3.0 by 38 mm [place] drug-eluting stent in mid  left anterior descending coronary artery.  · Angioplasty and placement of a 3.5 by 12 mm [place] drug eluting stent in proximal  left anterior descending coronary artery  Angioplasty and placement of a 2.5 by 8 mm [place] drug eluting stent in proximal  left posterolateral coronary artery.    LABORATORY  Lab Results   Component Value Date    SODIUM 139 05/09/2020    POTASSIUM 3.9 05/09/2020    CHLORIDE 105 05/09/2020    CO2 24 05/09/2020    GLUCOSE 117 (H) 05/09/2020    BUN 23 (H) 05/09/2020    CREATININE 0.96 05/09/2020        Lab Results   Component Value Date    WBC 8.6 05/09/2020    HEMOGLOBIN 12.7 05/09/2020    HEMATOCRIT  39.3 05/09/2020    PLATELETCT 195 05/09/2020        Total time of the discharge process exceeds 40 minutes.

## 2020-05-09 NOTE — PROGRESS NOTES
Cardiology Follow Up Progress Note    Date of Service  5/9/2020    Attending Physician  KORI Duran M.D.    Chief Complaint   Transfer from [facility] for chest pain, cardiac catheterization.    MILVIA Boyce is a 79 y.o. female admitted 5/7/2020 with aboe.    Interim Events  She underwent cardiac catheterization as described.    Review of Systems  Review of Systems   Constitutional: Negative.  Negative for chills and fever.   HENT: Negative.  Negative for hearing loss.    Eyes: Negative.    Respiratory: Positive for shortness of breath. Negative for cough.    Cardiovascular: Negative.  Negative for chest pain, palpitations and leg swelling.   Gastrointestinal: Negative.  Negative for abdominal pain, nausea and vomiting.   Genitourinary: Negative.  Negative for dysuria and urgency.   Musculoskeletal: Negative.  Negative for myalgias.   Skin: Negative.  Negative for rash.   Neurological: Negative.  Negative for dizziness, weakness and headaches.   Hematological: Does not bruise/bleed easily.   Psychiatric/Behavioral: Negative.  The patient is not nervous/anxious.        Vital signs in last 24 hours  Temp:  [36.2 °C (97.1 °F)-37.3 °C (99.1 °F)] 37.3 °C (99.1 °F)  Pulse:  [58-66] 63  Resp:  [16-18] 18  BP: (110-166)/(57-86) 148/65  SpO2:  [93 %-98 %] 93 %    Physical Exam  Physical Exam  Constitutional:       Appearance: She is well-developed.   HENT:      Head: Normocephalic and atraumatic.   Eyes:      Conjunctiva/sclera: Conjunctivae normal.      Pupils: Pupils are equal, round, and reactive to light.   Neck:      Musculoskeletal: Normal range of motion and neck supple.   Musculoskeletal: Normal range of motion.   Neurological:      Mental Status: She is alert and oriented to person, place, and time.     (Phyical Examination is limited due to COVID precaution)    Lab Review  Lab Results   Component Value Date/Time    WBC 8.6 05/09/2020 02:55 AM    RBC 4.07 (L) 05/09/2020 02:55 AM     HEMOGLOBIN 12.7 05/09/2020 02:55 AM    HEMATOCRIT 39.3 05/09/2020 02:55 AM    MCV 96.6 05/09/2020 02:55 AM    MCH 31.2 05/09/2020 02:55 AM    MCHC 32.3 (L) 05/09/2020 02:55 AM    MPV 10.9 05/09/2020 02:55 AM      Lab Results   Component Value Date/Time    SODIUM 139 05/09/2020 02:08 AM    POTASSIUM 3.9 05/09/2020 02:08 AM    CHLORIDE 105 05/09/2020 02:08 AM    CO2 24 05/09/2020 02:08 AM    GLUCOSE 117 (H) 05/09/2020 02:08 AM    BUN 23 (H) 05/09/2020 02:08 AM    CREATININE 0.96 05/09/2020 02:08 AM      Lab Results   Component Value Date/Time    ASTSGOT 35 05/09/2020 02:08 AM    ALTSGPT 14 05/09/2020 02:08 AM     Lab Results   Component Value Date/Time    CHOLSTRLTOT 187 05/08/2020 03:11 AM     (H) 05/08/2020 03:11 AM    HDL 44 05/08/2020 03:11 AM    TRIGLYCERIDE 155 (H) 05/08/2020 03:11 AM    TROPONINT 304 (H) 05/08/2020 03:11 AM       No results for input(s): NTPROBNP in the last 72 hours.  (Above labs reviewed.)       Current Facility-Administered Medications:   •  ticagrelor (BRILINTA) tablet 90 mg, 90 mg, Oral, BID, Russell Olivas M.D., 90 mg at 05/09/20 0437  •  carvedilol (COREG) tablet 12.5 mg, 12.5 mg, Oral, BID WITH MEALS, Russell Olivas M.D., 12.5 mg at 05/09/20 0438  •  amLODIPine (NORVASC) tablet 10 mg, 10 mg, Oral, Q DAY, Russell Olivas M.D., 10 mg at 05/09/20 0435  •  ondansetron (ZOFRAN) syringe/vial injection 4 mg, 4 mg, Intravenous, Q6HRS PRN,  Jason Duran M.D., 4 mg at 05/08/20 1611  •  hydrALAZINE (APRESOLINE) injection 10 mg, 10 mg, Intravenous, Q6HRS PRN, H Jason Duran M.D.  •  atorvastatin (LIPITOR) tablet 80 mg, 80 mg, Oral, Nightly, Shoaib Hubbard M.D., 80 mg at 05/08/20 2040  •  levothyroxine (SYNTHROID) tablet 75 mcg, 75 mcg, Oral, AM ES, Shoaib Hubbard M.D., 75 mcg at 05/09/20 0436  •  senna-docusate (PERICOLACE or SENOKOT S) 8.6-50 MG per tablet 2 Tab, 2 Tab, Oral, BID, 2 Tab at 05/09/20 0436 **AND** polyethylene glycol/lytes (MIRALAX) PACKET 1 Packet, 1  Packet, Oral, QDAY PRN **AND** magnesium hydroxide (MILK OF MAGNESIA) suspension 30 mL, 30 mL, Oral, QDAY PRN **AND** bisacodyl (DULCOLAX) suppository 10 mg, 10 mg, Rectal, QDAY PRN, Shoaib Hubbard M.D.  •  enoxaparin (LOVENOX) inj 40 mg, 40 mg, Subcutaneous, DAILY, Shoaib Hubbard M.D., 40 mg at 05/09/20 0438  •  acetaminophen (TYLENOL) tablet 650 mg, 650 mg, Oral, Q6HRS PRN, Shoaib Hubbard M.D.  •  oxyCODONE immediate-release (ROXICODONE) tablet 5-10 mg, 5-10 mg, Oral, Q4HRS PRN, Shoaib Hubbard M.D.  •  aspirin EC (ECOTRIN) tablet 81 mg, 81 mg, Oral, DAILY, Shoaib Hubbard M.D., 81 mg at 05/09/20 0436  (Medications reviewed.)    Cardiac Imaging and Procedures Review  CARDIAC STUDIES/PROCEDURES:    CARDIAC CATHETERIZATION CONCLUSIONS by Russell Gallo (05/08/20)  Angioplasty and placement of a 3.0 by 38 mm [place] drug-eluting stent in mid  left anterior descending coronary artery.  Angioplasty and placement of a 3.5 by 12 mm [place] drug eluting stent in proximal  left anterior descending coronary artery.  Angioplasty and placement of a 2.5 by 8 mm [place] drug eluting stent in proximal  left posterolateral coronary artery.  (study result reviewed)    ECHOCARDIOGRAM CONCLUSIONS (05/08/20)  No prior study is available for comparison.   Mild concentric left ventricular hypertrophy.  Left ventricular ejection fraction is visually estimated to be 65%.  Grade II diastolic dysfunction.  Mild aortic stenosis.  Vmax is 2.8  m/s.  Normal inferior vena cava size and inspiratory collapse.  (study result reviewed)    EKG performed on (05/08/20) was reviewed: EKG personally interpreted shows sinus rhythm.    Assessment/Plan  1. Coronary artery disease with stent placement: She is clinically doing well without recurrence of her angina.  We will continue with current medical care including aspirin, ticagrelor, carvedilol and atorvastatin. We will consider changing ticagrelor to clopidogrel due to her shortness of breath which may  be side effect of this medication.  2. Hypertension: Blood pressure has been high. We will start lisinopril and reassess the blood pressure.  3. Hyperlipidemia: She is doing well on statin therapy without myalgia symptoms.  4.Disposition: He may be discharged to home today. She will follow up with her cardiologist Dr. Lombard, Timothy.      Thank you for allowing me to participate in the care of this patient.  Cardiology will sign off on this patient    Please contact me with any questions.    Anthony Jiang M.D.   Cardiologist, [facility]  [phone]

## 2020-05-09 NOTE — PROGRESS NOTES
Bedside report received.  Patient visiting with Cardiology NP.  Discussed need for physical therapy evaluation prior to discharge.  Patient very anxious regarding current hospitalization.  Nurse visited with patient to help reduce anxiety.  Patient complaining of pain in lower extremities.  Call light within reach, fall precautions reviewed.  Will continue to monitor.

## 2020-05-09 NOTE — CARE PLAN
Problem: Communication  Goal: The ability to communicate needs accurately and effectively will improve  Outcome: PROGRESSING AS EXPECTED     Problem: Discharge Barriers/Planning  Goal: Patient's continuum of care needs will be met  Outcome: PROGRESSING AS EXPECTED     Problem: Psychosocial Needs:  Goal: Level of anxiety will decrease  Outcome: PROGRESSING AS EXPECTED

## 2020-05-09 NOTE — PROGRESS NOTES
Assumed care of pt, received bedside report from ALIVIA Herrera. Pt resting in bed, 2L O2, A/Ox4, no complaints of pain. TR band in place, bruising around site, site soft, no complaints of pain or signs of bleeding. Fall and safety precautions in place, strip alarm in place. Discussed POC with pt, pt verbalizes understanding, no further needs at this time.

## 2020-05-09 NOTE — PROGRESS NOTES
TR band removed (total of 18ml), site clean, no signs of bleeding, dressed with gauze and transparent dressing. Pt tolerated well.

## 2020-05-09 NOTE — PROGRESS NOTES
Spoke with Dr. Duran regarding PT recommendations that patient is not currently safe for discharge and might benefit from inpatient rehab.  Physician to place orders.  Will continue to monitor.

## 2020-05-09 NOTE — THERAPY
"Physical Therapy Evaluation completed.   Bed Mobility:  Supine to Sit: Moderate Assist  Transfers: Sit to Stand: Minimal Assist  Gait: Level Of Assist: Maximal Assist with Front-Wheel Walker       Plan of Care: Will benefit from Physical Therapy 3 times per week  Discharge Recommendations: Equipment: No Equipment Needed. Post-acute therapy Discharge to a transitional care facility for continued skilled therapy services.    See \"Rehab Therapy-Acute\" Patient Summary Report for complete documentation.     79 year old woman who is s/p cardiac cath with stent placement, EF 65% with PMHx of MI, severe anemia, hypothyroidism with a EF of 46%.  Patient demonstrates significant limitation in activity tolerance, strength, and ROM secondary to body habitus which are likely contributing to patient's decrease in functional mobility.  Patient's daughter is currently living with her, and patient states that she is supportive, but probably won't be able to help her physically.  Patient initially very adamant about going home.  Significant time educated patient on importance of going to a facility to rehabilitation, as patient holds belief that a SNF is only for long term stay or is a \"nursing home\" in that she will never return to her house.  Significant time taken to educate patient that both inpatient rehabilitation and skilled nursing facilities are appropriate for short term rehabilitation to return to [facility].  Additionally, significant education provided to patient on importance of physical activity to improve her overall quality of life, as returning home will likely lead to fall or re-hospitalization secondary to working her heart to heavily doing ADLs and IADLs.  Patient receptive with education, and emotional as she is struggling to accept current level of functional mobility as well as she stated her  who passed 1.5 years ago is no longer here to help her.  Inpatient rehabilitation appropriate with close monitoring " of progressive exercise for patient to tolerate 3 hours of activities daily.  Skilled Nursing Facility for rehabilitation purpose also appropriate, as patient would definitely tolerate 1 hour daily of activity. Home not appropriate.     Zacarias Garcia PT

## 2020-05-10 PROCEDURE — 99232 SBSQ HOSP IP/OBS MODERATE 35: CPT | Performed by: FAMILY MEDICINE

## 2020-05-10 PROCEDURE — 770020 HCHG ROOM/CARE - TELE (206)

## 2020-05-10 PROCEDURE — A9270 NON-COVERED ITEM OR SERVICE: HCPCS | Performed by: INTERNAL MEDICINE

## 2020-05-10 PROCEDURE — 700111 HCHG RX REV CODE 636 W/ 250 OVERRIDE (IP): Performed by: FAMILY MEDICINE

## 2020-05-10 PROCEDURE — 700102 HCHG RX REV CODE 250 W/ 637 OVERRIDE(OP): Performed by: HOSPITALIST

## 2020-05-10 PROCEDURE — 700111 HCHG RX REV CODE 636 W/ 250 OVERRIDE (IP): Performed by: HOSPITALIST

## 2020-05-10 PROCEDURE — A9270 NON-COVERED ITEM OR SERVICE: HCPCS | Performed by: HOSPITALIST

## 2020-05-10 PROCEDURE — 700102 HCHG RX REV CODE 250 W/ 637 OVERRIDE(OP): Performed by: INTERNAL MEDICINE

## 2020-05-10 RX ORDER — LORAZEPAM 2 MG/ML
0.5 INJECTION INTRAMUSCULAR ONCE
Status: COMPLETED | OUTPATIENT
Start: 2020-05-10 | End: 2020-05-10

## 2020-05-10 RX ORDER — OXYCODONE AND ACETAMINOPHEN 7.5; 325 MG/1; MG/1
1-2 TABLET ORAL EVERY 6 HOURS PRN
Qty: 12 TAB | Refills: 0 | Status: ON HOLD | OUTPATIENT
Start: 2020-05-10 | End: 2020-05-26

## 2020-05-10 RX ADMIN — LEVOTHYROXINE SODIUM 75 MCG: 75 TABLET ORAL at 04:56

## 2020-05-10 RX ADMIN — LORAZEPAM 0.5 MG: 2 INJECTION INTRAMUSCULAR; INTRAVENOUS at 00:53

## 2020-05-10 RX ADMIN — CARVEDILOL 12.5 MG: 12.5 TABLET, FILM COATED ORAL at 17:00

## 2020-05-10 RX ADMIN — CARVEDILOL 12.5 MG: 12.5 TABLET, FILM COATED ORAL at 04:56

## 2020-05-10 RX ADMIN — TICAGRELOR 90 MG: 90 TABLET ORAL at 17:01

## 2020-05-10 RX ADMIN — TICAGRELOR 90 MG: 90 TABLET ORAL at 04:56

## 2020-05-10 RX ADMIN — HYDRALAZINE HYDROCHLORIDE 10 MG: 20 INJECTION INTRAMUSCULAR; INTRAVENOUS at 00:02

## 2020-05-10 RX ADMIN — ASPIRIN 81 MG: 81 TABLET, COATED ORAL at 04:56

## 2020-05-10 RX ADMIN — ATORVASTATIN CALCIUM 80 MG: 80 TABLET, FILM COATED ORAL at 20:48

## 2020-05-10 RX ADMIN — OXYCODONE HYDROCHLORIDE 10 MG: 5 TABLET ORAL at 20:48

## 2020-05-10 RX ADMIN — AMLODIPINE BESYLATE 10 MG: 10 TABLET ORAL at 04:56

## 2020-05-10 ASSESSMENT — FIBROSIS 4 INDEX: FIB4 SCORE: 3.84

## 2020-05-10 NOTE — DISCHARGE PLANNING
[facility] Transitional Care Coordination     Referral from:  Dr. Duran    Facesheet indicates: MCR/SALINA    Potential Rehab Diagnosis: Cardiac    Chart review indicates patient may have on going medical management and may have therapy needs to possibly meet inpatient rehab facility criteria with the goal of returning to community.    D/C support: Spouse & Daughter     Physiatry consultation forwarded per protocol.      Presented from an OSH for C.P.  S/P cardiac cath.  Would appreciate an OT eval once appropriate.  Sena received IV B/P med x1 in the past 24 H, this may be a barrier to [facility]. Physiatry to consult. Waiting on additional information to determine appropriateness for acute inpatient rehabilitation. Will continue to follow.      Thank you for the referral.

## 2020-05-10 NOTE — CARE PLAN
Problem: Communication  Goal: The ability to communicate needs accurately and effectively will improve  Outcome: PROGRESSING AS EXPECTED  Intervention: [place] patient and significant other/support system to call light to alert staff of needs  Oriented to:: All of the Following : Location of Bathroom, Visiting Policy, Unit Routine, Call Light and Bedside Controls, Bedside Rail Policy, Smoking Policy, Rights and Responsibilities, Bedside Report, and Patient Education Notebook     Problem: Safety  Goal: Will remain free from falls  Outcome: PROGRESSING AS EXPECTED  Intervention: Implement fall precautions  Environmental Precautions:   Treaded Slipper Socks on Patient   Personal Belongings, Wastebasket, Call Bell etc. in Easy Reach   Transferred to Stronger Side   Report Given to Other Health Care Providers Regarding Fall Risk   Bed in Low Position, Bed alarm is on   Communication Sign for Patients & Families   Mobility Assessed & Appropriate Sign Placed

## 2020-05-10 NOTE — PROGRESS NOTES
Assumed care at 0700. Bedside report received from Jayashree. Patient's chart and MAR reviewed. Pt denies pain at this time. Pt is A & O 4 but anxious about not being able to go home yet. Patient was updated on plan of care for the day. Questions answered and concerns addressed.  Pt denies any additional needs at this time. White board updated. Call light, phone and personal belongings within reach.

## 2020-05-10 NOTE — DISCHARGE SUMMARY
"Discharge Summary    CHIEF COMPLAINT ON ADMISSION  No chief complaint on file.      Reason for Admission  Heart Failure     Admission Date  5/7/2020    CODE STATUS  Full Code    HPI & HOSPITAL COURSE  This is a 79 y.o. female here with  chest pain.  Ms. Boyce has a past medical history of previous myocardial infarction secondary to severe anemia from acute blood loss due to a gynecologic malignancy though never had a heart catheterization.  She states she was in her usual state of health though \"I have been really tired\" for the past few weeks.  About 2 weeks ago she states she had a \"burning\" in her upper chest that went up into her mouth and then had the same symptoms 2 days ago.  She called 911 is brought to [facility] and was monitored there.  She was given Plavix, statin, beta-blocker, aspirin and had echocardiogram revealing ejection fraction 46% with apical akinesis.  Her troponins were 0.26 on admission and then went up to 2.05 then to 3.65 and trended down.  Her chest x-ray revealed pulmonary edema and she was given IV Lasix.  She has been transferred here for heart catheterization.  I have reviewed the discharge summary from Dr. Sandie Flores MD.   Ms. Boyce is amenable to a heart cath in the morning.     Of note, patient has not had a cough, is only felt short of breath when she had her chest pain otherwise not had any other breathing problems, has not had a fever, has not had changes in her taste nor her smell and she denies fever and body aches.  She denies any contact with persons known to have COVID.  She was admitted with ACS  and was seen by cardiologist and had cardiac cath Procedures:  · Insertion of 5/6 FR sheath in the right radial artery  · right and left coronary arteriograms  · Angioplasty and placement of a 3.0 by 38 mm [place] drug-eluting stent in mid  left anterior descending coronary artery.  · Angioplasty and placement of a 3.5 by 12 mm [place] drug eluting stent in proximal  left " anterior descending coronary artery  Angioplasty and placement of a 2.5 by 8 mm [place] drug eluting stent in proximal  left posterolateral coronary artery.    She has done very well posy cath and she is cleared by cardiology for discharge.  She is on asa and brillianta. And her coreg is increased  Her discharge was placed on hold since pt worked with the pt and recommended SNF.she will be discharged to SNF when bed is available.      Constitutional:       Appearance: She is obese.   HENT:      Head: Normocephalic and atraumatic.      Nose: Nose normal.      Mouth/Throat:      Mouth: Mucous membranes are dry.   Eyes:      Extraocular Movements: Extraocular movements intact.      Pupils: Pupils are equal, round, and reactive to light.   Neck:      Musculoskeletal: Normal range of motion and neck supple.   Cardiovascular:      Rate and Rhythm: Normal rate and regular rhythm.      Pulses: Normal pulses.      Heart sounds: Normal heart sounds.   Pulmonary:      Effort: Pulmonary effort is normal.      Breath sounds: Normal breath sounds.   Abdominal:      Palpations: Abdomen is soft.   Musculoskeletal: Normal range of motion.   Skin:     General: Skin is warm and dry.   Neurological:      General: No focal deficit present.      Mental Status: She is alert and oriented to person, place, and time.      ACS (acute coronary syndrome) ([facility])- (present on admission)  Assessment & Plan  · A/p cardiac cath :Insertion of 5/6 FR sheath in the right radial artery  · right and left coronary arteriograms  · Angioplasty and placement of a 3.0 by 38 mm [place] drug-eluting stent in mid  left anterior descending coronary artery.  · Angioplasty and placement of a 3.5 by 12 mm [place] drug eluting stent in proximal  left anterior descending coronary artery  Angioplasty and placement of a 2.5 by 8 mm Mik[place] drug eluting stent in proximal  left posterolateral coronary artery.     On asa  On brilinta  On lipitor     Essential hypertension- (present on  admission)  Assessment & Plan   coreg  Amlodipine       Acquired hypothyroidism- (present on admission)  Assessment & Plan  Continue synthroid 75 mcg daily        Therefore, she is discharged in good and stable condition .    The patient met 2-midnight criteria for an inpatient stay at the time of discharge.    Discharge Date  5/9/20    FOLLOW UP ITEMS POST DISCHARGE  pcp next week  Cardiology in 1 to 2 weeks    DISCHARGE DIAGNOSES  Principal Problem:    ACS (acute coronary syndrome) (HCC) POA: Yes  Active Problems:    Acquired hypothyroidism POA: Yes    Essential hypertension POA: Yes  Resolved Problems:    * No resolved hospital problems. *      FOLLOW UP  No future appointments.  Sandie Flores M.D.  [address] [facility] [address]  [phone]    Schedule an appointment as soon as possible for a visit in 1 week  For follow-up      MEDICATIONS ON DISCHARGE     Medication List      START taking these medications      Instructions   amLODIPine 10 MG Tabs  Commonly known as:  NORVASC   Take 1 Tab by mouth every day.  Dose:  10 mg     aspirin 81 MG EC tablet   Take 1 Tab by mouth every day.  Dose:  81 mg     ticagrelor 90 MG Tabs tablet  Commonly known as:  BRILINTA   Take 1 Tab by mouth 2 Times a Day.  Dose:  90 mg        CHANGE how you take these medications      Instructions   carvedilol 12.5 MG Tabs  What changed:    · medication strength  · how much to take  Commonly known as:  COREG   Take 1 Tab by mouth 2 times a day, with meals.  Dose:  12.5 mg     oxyCODONE-acetaminophen 7.5-325 MG per tablet  What changed:  Another medication with the same name was removed. Continue taking this medication, and follow the directions you see here.  Commonly known as:  PERCOCET   Take 1-2 Tabs by mouth every 6 hours as needed for Severe Pain (pain) for up to 3 days.  Dose:  1-2 Tab        CONTINUE taking these medications      Instructions   atorvastatin 20 MG Tabs  Commonly known as:  LIPITOR   Take 80 mg by mouth every  evening.  Dose:  80 mg     levothyroxine 75 MCG Tabs  Commonly known as:  SYNTHROID   Take 75 mcg by mouth Every morning on an empty stomach.  Dose:  75 mcg     metoclopramide 10 MG Tabs  Commonly known as:  REGLAN   Take 1 Tab by mouth every 6 hours as needed (nausea/emesis).  Dose:  10 mg     Non Formulary Request   Take 1 tablet by mouth. Indications: Tart cherry  Dose:  1 tablet     Non Formulary Request   Take 1 tablet by mouth. Indications: Vit Code  Dose:  1 tablet        STOP taking these medications    cloNIDine 0.1 MG Tabs  Commonly known as:  CATAPRES     tramadol 50 MG Tabs  Commonly known as:  ULTRAM            Allergies  Allergies   Allergen Reactions   • Allopurinol      Rash, swollen       DIET  Orders Placed This Encounter   Procedures   • Diet Order Cardiac     Standing Status:   Standing     Number of Occurrences:   1     Order Specific Question:   Diet:     Answer:   Cardiac [6]       ACTIVITY  As tolerated.  Weight bearing as tolerated    CONSULTATIONS  cardiology    PROCEDURES  · s/p cardiac cath :Insertion of 5/6 FR sheath in the right radial artery  · right and left coronary arteriograms  · Angioplasty and placement of a 3.0 by 38 mm [place] drug-eluting stent in mid  left anterior descending coronary artery.  · Angioplasty and placement of a 3.5 by 12 mm [place] drug eluting stent in proximal  left anterior descending coronary artery  Angioplasty and placement of a 2.5 by 8 mm [place] drug eluting stent in proximal  left posterolateral coronary artery.    LABORATORY  Lab Results   Component Value Date    SODIUM 139 05/09/2020    POTASSIUM 3.9 05/09/2020    CHLORIDE 105 05/09/2020    CO2 24 05/09/2020    GLUCOSE 117 (H) 05/09/2020    BUN 23 (H) 05/09/2020    CREATININE 0.96 05/09/2020        Lab Results   Component Value Date    WBC 8.6 05/09/2020    HEMOGLOBIN 12.7 05/09/2020    HEMATOCRIT 39.3 05/09/2020    PLATELETCT 195 05/09/2020        Total time of the discharge process exceeds 40 minutes.

## 2020-05-10 NOTE — PROGRESS NOTES
Patient complaining about nausea approximately 5 minutes after dose of PRN hydralazine was given. Patients QTC is 500. Dr. Gracia paged, and received orders for 0.5 mg IV ativan one time dose.

## 2020-05-10 NOTE — PROGRESS NOTES
Monitor Summary:  SR w/ BBB  63-85 touched up to 145 for 6 sec, up to 138 for 11 sec.  F PAC, R PVC, 1.5 second pause  .18/.12/.62

## 2020-05-10 NOTE — PROGRESS NOTES
Assumed care of patient, bedside report received from Amparo BUNCH. Updated on POC, call light within reach and fall precautions in place. Bed locked and in lowest position. Patient instructed to call for assistance before getting out of bed. All questions answered, no other needs at this time.

## 2020-05-11 ENCOUNTER — PATIENT OUTREACH (OUTPATIENT)
Dept: HEALTH INFORMATION MANAGEMENT | Facility: OTHER | Age: 80
End: 2020-05-11

## 2020-05-11 ENCOUNTER — APPOINTMENT (OUTPATIENT)
Dept: RADIOLOGY | Facility: MEDICAL CENTER | Age: 80
DRG: 247 | End: 2020-05-11
Attending: FAMILY MEDICINE
Payer: MEDICARE

## 2020-05-11 PROBLEM — R50.9 FEVER: Status: ACTIVE | Noted: 2020-05-11

## 2020-05-11 PROCEDURE — 700111 HCHG RX REV CODE 636 W/ 250 OVERRIDE (IP): Performed by: HOSPITALIST

## 2020-05-11 PROCEDURE — 770020 HCHG ROOM/CARE - TELE (206)

## 2020-05-11 PROCEDURE — 700102 HCHG RX REV CODE 250 W/ 637 OVERRIDE(OP): Performed by: FAMILY MEDICINE

## 2020-05-11 PROCEDURE — 36415 COLL VENOUS BLD VENIPUNCTURE: CPT

## 2020-05-11 PROCEDURE — 99222 1ST HOSP IP/OBS MODERATE 55: CPT | Performed by: PHYSICAL MEDICINE & REHABILITATION

## 2020-05-11 PROCEDURE — A9270 NON-COVERED ITEM OR SERVICE: HCPCS | Performed by: FAMILY MEDICINE

## 2020-05-11 PROCEDURE — A9270 NON-COVERED ITEM OR SERVICE: HCPCS | Performed by: INTERNAL MEDICINE

## 2020-05-11 PROCEDURE — 97166 OT EVAL MOD COMPLEX 45 MIN: CPT

## 2020-05-11 PROCEDURE — 99232 SBSQ HOSP IP/OBS MODERATE 35: CPT | Performed by: FAMILY MEDICINE

## 2020-05-11 PROCEDURE — 700102 HCHG RX REV CODE 250 W/ 637 OVERRIDE(OP): Performed by: HOSPITALIST

## 2020-05-11 PROCEDURE — 87040 BLOOD CULTURE FOR BACTERIA: CPT | Mod: 91

## 2020-05-11 PROCEDURE — 700102 HCHG RX REV CODE 250 W/ 637 OVERRIDE(OP): Performed by: INTERNAL MEDICINE

## 2020-05-11 PROCEDURE — A9270 NON-COVERED ITEM OR SERVICE: HCPCS | Performed by: HOSPITALIST

## 2020-05-11 PROCEDURE — 73630 X-RAY EXAM OF FOOT: CPT | Mod: LT

## 2020-05-11 RX ORDER — BUMETANIDE 1 MG/1
1 TABLET ORAL DAILY
Status: ON HOLD | COMMUNITY
End: 2020-05-26

## 2020-05-11 RX ORDER — ERGOCALCIFEROL 1.25 MG/1
50000 CAPSULE ORAL
Status: ON HOLD | COMMUNITY
End: 2020-05-26

## 2020-05-11 RX ORDER — MIRTAZAPINE 15 MG/1
7.5 TABLET, FILM COATED ORAL NIGHTLY
Status: ON HOLD | COMMUNITY
End: 2020-05-26 | Stop reason: SDUPTHER

## 2020-05-11 RX ORDER — VALSARTAN 80 MG/1
40 TABLET ORAL 2 TIMES DAILY
Status: ON HOLD | COMMUNITY
End: 2020-05-26

## 2020-05-11 RX ORDER — INDOMETHACIN 25 MG/1
25 CAPSULE ORAL 3 TIMES DAILY
Status: ON HOLD | COMMUNITY
End: 2020-05-26

## 2020-05-11 RX ORDER — HYDRALAZINE HYDROCHLORIDE 20 MG/ML
10 INJECTION INTRAMUSCULAR; INTRAVENOUS ONCE
Status: COMPLETED | OUTPATIENT
Start: 2020-05-11 | End: 2020-05-11

## 2020-05-11 RX ORDER — LORAZEPAM 1 MG/1
0.5 TABLET ORAL
Status: COMPLETED | OUTPATIENT
Start: 2020-05-11 | End: 2020-05-12

## 2020-05-11 RX ORDER — LISINOPRIL 5 MG/1
15 TABLET ORAL
Status: DISCONTINUED | OUTPATIENT
Start: 2020-05-11 | End: 2020-05-12 | Stop reason: HOSPADM

## 2020-05-11 RX ORDER — POTASSIUM CHLORIDE 1.5 G/1.58G
10 POWDER, FOR SOLUTION ORAL DAILY
Status: ON HOLD | COMMUNITY
End: 2020-05-26

## 2020-05-11 RX ORDER — CELECOXIB 200 MG/1
200 CAPSULE ORAL 2 TIMES DAILY
Status: ON HOLD | COMMUNITY
End: 2020-05-26

## 2020-05-11 RX ADMIN — AMLODIPINE BESYLATE 10 MG: 10 TABLET ORAL at 00:26

## 2020-05-11 RX ADMIN — HYDRALAZINE HYDROCHLORIDE 10 MG: 20 INJECTION INTRAMUSCULAR; INTRAVENOUS at 02:23

## 2020-05-11 RX ADMIN — TICAGRELOR 90 MG: 90 TABLET ORAL at 16:33

## 2020-05-11 RX ADMIN — ASPIRIN 81 MG: 81 TABLET, COATED ORAL at 04:11

## 2020-05-11 RX ADMIN — ENOXAPARIN SODIUM 40 MG: 100 INJECTION SUBCUTANEOUS at 04:11

## 2020-05-11 RX ADMIN — CARVEDILOL 12.5 MG: 12.5 TABLET, FILM COATED ORAL at 04:11

## 2020-05-11 RX ADMIN — LEVOTHYROXINE SODIUM 75 MCG: 75 TABLET ORAL at 04:11

## 2020-05-11 RX ADMIN — CARVEDILOL 12.5 MG: 12.5 TABLET, FILM COATED ORAL at 16:33

## 2020-05-11 RX ADMIN — ATORVASTATIN CALCIUM 80 MG: 80 TABLET, FILM COATED ORAL at 20:18

## 2020-05-11 RX ADMIN — TICAGRELOR 90 MG: 90 TABLET ORAL at 04:11

## 2020-05-11 RX ADMIN — LISINOPRIL 15 MG: 5 TABLET ORAL at 10:16

## 2020-05-11 RX ADMIN — SENNOSIDES AND DOCUSATE SODIUM 2 TABLET: 8.6; 5 TABLET ORAL at 16:33

## 2020-05-11 ASSESSMENT — ENCOUNTER SYMPTOMS
EYE PAIN: 0
PALPITATIONS: 0
ABDOMINAL PAIN: 0
VOMITING: 0
SPUTUM PRODUCTION: 0
SHORTNESS OF BREATH: 0
PHOTOPHOBIA: 0
CHILLS: 0
NAUSEA: 0
CLAUDICATION: 0
HEMOPTYSIS: 0
TINGLING: 0
DIAPHORESIS: 0
BACK PAIN: 0
NECK PAIN: 0
ORTHOPNEA: 0
DOUBLE VISION: 0
HEADACHES: 0
TREMORS: 0
MYALGIAS: 0

## 2020-05-11 ASSESSMENT — COGNITIVE AND FUNCTIONAL STATUS - GENERAL
DRESSING REGULAR LOWER BODY CLOTHING: A LOT
TOILETING: A LITTLE
HELP NEEDED FOR BATHING: A LOT
DRESSING REGULAR UPPER BODY CLOTHING: A LITTLE
DAILY ACTIVITIY SCORE: 17
PERSONAL GROOMING: A LITTLE
SUGGESTED CMS G CODE MODIFIER DAILY ACTIVITY: CK

## 2020-05-11 ASSESSMENT — ACTIVITIES OF DAILY LIVING (ADL): TOILETING: INDEPENDENT

## 2020-05-11 ASSESSMENT — FIBROSIS 4 INDEX: FIB4 SCORE: 3.84

## 2020-05-11 NOTE — PROGRESS NOTES
This nurse called Dr. Gracia regarding the pt's blood pressure which is still high post giving the pt po pain meds; the pt's blood pressure is 179/75; this nurse obtained a verbal order to give the pt's norvasc at midnight tonight and continue to monitor.

## 2020-05-11 NOTE — THERAPY
"Occupational Therapy Evaluation completed.   Functional Status:  Pt is an 78yo female admitted for chest pain, s/p heart cath. PMHx of MI and HTN. Supine>sit with min A. LB dressing w/max A. Min A for UB dressing. Sit>stand with min A and stand step txf to BSC w/ FWW and min A. BSC txf with v/cs and min A. Toileting w/min A for balance. Txf to chair with fww and min A and v/cs for steps. Seated grooming with SPV. Req extra time for all activities d/t increased WOB and recovery; SpO2 above 90% throughout. Reports lives with dtr, but she is able to assist minimally.  Currently limited by decreased functional mobility, activity tolerance, strength, balance, and pain which are currently affecting pt's ability to complete ADLs/IADLs at baseline. Currently recommend acute OT, and Recommend post-acute placement for additional occupational therapy services prior to discharge home.    Plan of Care: Will benefit from Occupational Therapy 4 times per week  Discharge Recommendations:  Equipment: Will Continue to Assess for Equipment Needs. Post-acute therapy : Recommend post-acute placement for additional occupational therapy services prior to discharge home.      See \"Rehab Therapy-Acute\" Patient Summary Report for complete documentation.    "

## 2020-05-11 NOTE — PROGRESS NOTES
"Hospital Medicine Daily Progress Note    Date of Service  5/11/2020    Chief Complaint  79 y.o. female admitted 5/7/2020 with Chest pain    Hospital Course    79 y.o. female who presented 5/7/2020 with chest pain.  Ms. Boyce has a past medical history of previous myocardial infarction secondary to severe anemia from acute blood loss due to a gynecologic malignancy though never had a heart catheterization.  She states she was in her usual state of health though \"I have been really tired\" for the past few weeks.  About 2 weeks ago she states she had a \"burning\" in her upper chest that went up into her mouth and then had the same symptoms 2 days ago.  She called 911 is brought to [facility] and was monitored there.  She was given Plavix, statin, beta-blocker, aspirin and had echocardiogram revealing ejection fraction 46% with apical akinesis.  Her troponins were 0.26 on admission and then went up to 2.05 then to 3.65 and trended down.  Her chest x-ray revealed pulmonary edema and she was given IV Lasix.  She has been transferred here for heart catheterization.    Interval Problem Update  Has spiked fever  S/p  Cardiac cath    Consultants/Specialty  cardiology    Code Status  full    Disposition  pending    Review of Systems  Review of Systems   Constitutional: Positive for malaise/fatigue. Negative for chills and diaphoresis.   HENT: Negative for ear discharge, ear pain and nosebleeds.    Eyes: Negative for double vision, photophobia and pain.   Respiratory: Negative for hemoptysis, sputum production and shortness of breath.    Cardiovascular: Negative for palpitations, orthopnea and claudication.   Gastrointestinal: Negative for abdominal pain, nausea and vomiting.   Genitourinary: Negative for frequency, hematuria and urgency.   Musculoskeletal: Negative for back pain, myalgias and neck pain.   Skin: Negative for itching and rash.   Neurological: Negative for tingling, tremors and headaches.    "     Physical Exam  Temp:  [36.4 °C (97.6 °F)-38.1 °C (100.5 °F)] 37 °C (98.6 °F)  Pulse:  [60-75] 63  Resp:  [16-18] 18  BP: (144-179)/() 145/110  SpO2:  [92 %-97 %] 94 %    Physical Exam  Constitutional:       General: She is not in acute distress.     Appearance: She is obese. She is not toxic-appearing.   HENT:      Head: Normocephalic and atraumatic.      Nose: No congestion or rhinorrhea.      Mouth/Throat:      Mouth: Mucous membranes are dry.   Eyes:      Conjunctiva/sclera: Conjunctivae normal.   Neck:      Musculoskeletal: No neck rigidity or muscular tenderness.   Cardiovascular:      Rate and Rhythm: Normal rate and regular rhythm.      Heart sounds: No murmur. No friction rub.   Pulmonary:      Breath sounds: No stridor. No rhonchi.   Abdominal:      Tenderness: There is no abdominal tenderness. There is no guarding.   Musculoskeletal:         General: No swelling or tenderness.   Skin:     Coloration: Skin is not jaundiced or pale.   Neurological:      General: No focal deficit present.      Mental Status: Mental status is at baseline.         Fluids    Intake/Output Summary (Last 24 hours) at 5/11/2020 0838  Last data filed at 5/10/2020 2028  Gross per 24 hour   Intake 1340 ml   Output 350 ml   Net 990 ml       Laboratory  Recent Labs     05/09/20  0255   WBC 8.6   RBC 4.07*   HEMOGLOBIN 12.7   HEMATOCRIT 39.3   MCV 96.6   MCH 31.2   MCHC 32.3*   RDW 50.6*   PLATELETCT 195   MPV 10.9     Recent Labs     05/09/20  0208   SODIUM 139   POTASSIUM 3.9   CHLORIDE 105   CO2 24   GLUCOSE 117*   BUN 23*   CREATININE 0.96   CALCIUM 8.6                   Imaging       Assessment/Plan  * ACS (acute coronary syndrome) (HCC)- (present on admission)  Assessment & Plan  · A/p cardiac cath :Insertion of 5/6 FR sheath in the right radial artery  · right and left coronary arteriograms  · Angioplasty and placement of a 3.0 by 38 mm [place] drug-eluting stent in mid  left anterior descending coronary  artery.  · Angioplasty and placement of a 3.5 by 12 mm [place] drug eluting stent in proximal  left anterior descending coronary artery  Angioplasty and placement of a 2.5 by 8 mm [place] drug eluting stent in proximal  left posterolateral coronary artery.    On asa  On brilinta  On lipitor    Fever  Assessment & Plan  Pt has spiked fever  Not sure about the source  Does not look toxic  No antibiotic at this time  Will do cxray  Blood cultures times 2  UA  Will cancel discharge for now    Essential hypertension- (present on admission)  Assessment & Plan  Continue coreg  Not well controlled  dced Amlodipine  Started her on lisinopril    Acquired hypothyroidism- (present on admission)  Assessment & Plan  Continue synthroid 75 mcg daily       VTE prophylaxis: lovenox

## 2020-05-11 NOTE — DISCHARGE PLANNING
[facility]  Outcome: Voice mail left regarding referral. Requested a call back.

## 2020-05-11 NOTE — PROGRESS NOTES
Assumed care for the pt; bed side shift report received; sr up x2; bed alarm on; plan of care reviewed with the pt; pt is talking to this nurse at this time; no respiratory distress noted.

## 2020-05-11 NOTE — PROGRESS NOTES
This nurse spoke with Dr. Gracia regarding the pt's blood pressure post giving the po Norvasc and made her aware of the pt's blood pressure which was 166/78; Dr. Gracia is to place an order in the MAR for iv hydralazine.

## 2020-05-11 NOTE — ASSESSMENT & PLAN NOTE
Pt has spiked fever  Not sure about the source  Does not look toxic  No antibiotic at this time  Will do cxray  Blood cultures times 2  UA  Will cancel discharge for now

## 2020-05-11 NOTE — DISCHARGE PLANNING
"Hospital Care Management Team Assessment    In the case of an emergency, pt's NOK is Gali Burt (Daughter) [phone].     This RNCM spoke with pt over the phone and obtained the information used in this assessment. Pt verified accuracy of facesheet.     Pt lives alone in a 2 story house but her daughter and son-in-law have been staying with her since the quarantine and assisting intermittently with ADL support. Pt uses the [facility] pharmacy in [place]. Prior to current hospitalization, pt was mostly independent with ADLS/IADLS but occasionally requires some assistance on her \"bad days\". Pt uses a walker at home and purchased it about 1.5 years ago when her  passed away. Pt drives herself to and from her doctors appointments or her daughter helps sometimes as well. Pt reports having prescription coverage and no other financial concerns at this time. Pt denies hx of SA or MH. Pt speaks of a strong support system from her adult children and their spouses. Pt thinks she has an AD but isn't sure if she appointed her daughter or her son as the POA and does not have a physical copy with her. Pt's plan is to discharge to a SNF prior to returning home in order to strengthen her functioning.    Home address is: [address] [place] [address]      Upon D/C, pt states that her daughter will provide transport home, if applicable.       Information Source  Orientation : Oriented x 4  Information Given By: Patient  Informant's Name: Sena Boyce  Who is responsible for making decisions for patient? : Patient    Elopement Risk  Legal Hold: No  Ambulatory or Self Mobile in Wheelchair: Yes  Disoriented: No  Psychiatric Symptoms: None  History of Wandering: No  Elopement this Admit: No  Vocalizing Wanting to Leave: No  Displays Behaviors, Body Language Wanting to Leave: No-Not at Risk for Elopement  Elopement Risk: Not at Risk for Elopement    Interdisciplinary Discharge Planning  Primary Care Physician: Sandie" Mark  Lives with - Patient's Self Care Capacity: Adult Children  Patient or legal guardian wants to designate a caregiver (see row info): No  Support Systems: Children  Housing / Facility: 2 Story House  Do You Take your Prescribed Medications Regularly: Yes  Able to Return to Previous ADL's: Future Time w/Therapy  Mobility Issues: Yes  Prior Services: Intermittent Physical Support for ADL Per Family  Patient Expects to be Discharged to:: SNF  Assistance Needed: Yes  Durable Medical Equipment: Walker  DME Provider / Phone: N/A    Discharge Preparedness  What is your plan after discharge?: Skilled nursing facility  What are your discharge supports?: Child  Prior Functional Level: Uses Walker, Ambulatory, Independent with Activities of Daily Living, Independent with Medication Management  Difficulity with ADLs: None  Difficulity with IADLs: Driving  Difficulity with IADL Comments: Daughter assists.     Functional Assesment  Prior Functional Level: Uses Walker, Ambulatory, Independent with Activities of Daily Living, Independent with Medication Management    Finances  Financial Barriers to Discharge: No  Prescription Coverage: Yes    Vision / Hearing Impairment  Vision Impairment : Yes  Right Eye Vision: Impaired, Wears Glasses  Left Eye Vision: Impaired, Wears Glasses  Hearing Impairment : No    Advance Directive  Advance Directive?: None  Advance Directive offered?: AD Booklet refused    Domestic Abuse  Have you ever been the victim of abuse or violence?: No  Physical Abuse or Sexual Abuse: No  Verbal Abuse or Emotional Abuse: No  Possible Abuse Reported to:: Not Applicable    Psychological Assessment  History of Substance Abuse: None  History of Psychiatric Problems: No  Non-compliant with Treatment: No  Newly Diagnosed Illness: No    Discharge Risks or Barriers  Discharge risks or barriers?: Post-acute placement / services    Anticipated Discharge Information  Anticipated discharge disposition: SNF

## 2020-05-11 NOTE — DISCHARGE PLANNING
Received Choice form at 1150  Agency/Facility Name: [facility]   Referral sent per Choice form @ 8380

## 2020-05-11 NOTE — PROGRESS NOTES
Bedside report received. Patient A&O x4. Complains of pain on top of left foot, will continue to monitor.     POC discussed with patient. Patient verbalized understanding. Call light and belongings within reach. Bed locked and in lowest position, alarm and fall precautions in place.

## 2020-05-11 NOTE — CARE PLAN
Problem: Communication  Goal: The ability to communicate needs accurately and effectively will improve  Outcome: PROGRESSING AS EXPECTED  Note: A/ox4; pt is able to make his needs known.      Problem: Safety  Goal: Will remain free from injury  Outcome: PROGRESSING AS EXPECTED  Note: Call bell within reach; sr up x2; up with assist; pt can pivot from bed to chair x2 person assist; bed alarm on.

## 2020-05-11 NOTE — DISCHARGE PLANNING
Acute Rehab Hospital/ Transitional Care Coordination  PT prefers to transfer to [facility] in [place], [place] and not recetpve to referral.     Thank you for referral.

## 2020-05-11 NOTE — DISCHARGE PLANNING
Hospital Care Management Discharge Planning Note       Anticipated Discharge Disposition:  · SNF     Action:  · This RN CM spoke with with patient over the phone to obtain SNF choice as she is currently refusing [facility].   · Per choice form, pt's preference is as follows:  · (1[facility]  · Patient has been made aware that she may be responsible for transportation costs.  · This RN CM faxed choice form to ANNE [facility].  · Fax confirmation received at [phone].     Barriers to Discharge:  · SNF acceptance and bed availability.     Plan:  · Follow up with CCA and treatment team.       For further assistance please contact the RN Case Manager at [phone].

## 2020-05-12 ENCOUNTER — APPOINTMENT (OUTPATIENT)
Dept: RADIOLOGY | Facility: MEDICAL CENTER | Age: 80
DRG: 247 | End: 2020-05-12
Attending: INTERNAL MEDICINE
Payer: MEDICARE

## 2020-05-12 ENCOUNTER — HOSPITAL ENCOUNTER (INPATIENT)
Facility: REHABILITATION | Age: 80
LOS: 14 days | DRG: 950 | End: 2020-05-26
Attending: PHYSICAL MEDICINE & REHABILITATION | Admitting: PHYSICAL MEDICINE & REHABILITATION
Payer: MEDICARE

## 2020-05-12 VITALS
SYSTOLIC BLOOD PRESSURE: 136 MMHG | BODY MASS INDEX: 35.83 KG/M2 | OXYGEN SATURATION: 94 % | RESPIRATION RATE: 16 BRPM | DIASTOLIC BLOOD PRESSURE: 61 MMHG | TEMPERATURE: 99.1 F | WEIGHT: 209.88 LBS | HEIGHT: 64 IN | HEART RATE: 61 BPM

## 2020-05-12 DIAGNOSIS — I24.9 ACS (ACUTE CORONARY SYNDROME) (HCC): ICD-10-CM

## 2020-05-12 PROBLEM — R50.9 FEVER: Status: RESOLVED | Noted: 2020-05-11 | Resolved: 2020-05-12

## 2020-05-12 LAB
ALBUMIN SERPL BCP-MCNC: 3.2 G/DL (ref 3.2–4.9)
ALBUMIN/GLOB SERPL: 1 G/DL
ALP SERPL-CCNC: 138 U/L (ref 30–99)
ALT SERPL-CCNC: 44 U/L (ref 2–50)
ANION GAP SERPL CALC-SCNC: 14 MMOL/L (ref 7–16)
AST SERPL-CCNC: 44 U/L (ref 12–45)
BASOPHILS # BLD AUTO: 0.6 % (ref 0–1.8)
BASOPHILS # BLD: 0.05 K/UL (ref 0–0.12)
BILIRUB SERPL-MCNC: 2.6 MG/DL (ref 0.1–1.5)
BUN SERPL-MCNC: 23 MG/DL (ref 8–22)
CALCIUM SERPL-MCNC: 8.5 MG/DL (ref 8.5–10.5)
CHLORIDE SERPL-SCNC: 105 MMOL/L (ref 96–112)
CO2 SERPL-SCNC: 20 MMOL/L (ref 20–33)
COVID ORDER STATUS COVID19: NORMAL
CREAT SERPL-MCNC: 0.86 MG/DL (ref 0.5–1.4)
EOSINOPHIL # BLD AUTO: 0.14 K/UL (ref 0–0.51)
EOSINOPHIL NFR BLD: 1.6 % (ref 0–6.9)
ERYTHROCYTE [DISTWIDTH] IN BLOOD BY AUTOMATED COUNT: 50.4 FL (ref 35.9–50)
FLUAV RNA SPEC QL NAA+PROBE: NEGATIVE
FLUBV RNA SPEC QL NAA+PROBE: NEGATIVE
GLOBULIN SER CALC-MCNC: 3.3 G/DL (ref 1.9–3.5)
GLUCOSE SERPL-MCNC: 114 MG/DL (ref 65–99)
HCT VFR BLD AUTO: 36.8 % (ref 37–47)
HGB BLD-MCNC: 12.2 G/DL (ref 12–16)
IMM GRANULOCYTES # BLD AUTO: 0.04 K/UL (ref 0–0.11)
IMM GRANULOCYTES NFR BLD AUTO: 0.5 % (ref 0–0.9)
LYMPHOCYTES # BLD AUTO: 1 K/UL (ref 1–4.8)
LYMPHOCYTES NFR BLD: 11.6 % (ref 22–41)
MCH RBC QN AUTO: 31.1 PG (ref 27–33)
MCHC RBC AUTO-ENTMCNC: 33.2 G/DL (ref 33.6–35)
MCV RBC AUTO: 93.9 FL (ref 81.4–97.8)
MONOCYTES # BLD AUTO: 1.3 K/UL (ref 0–0.85)
MONOCYTES NFR BLD AUTO: 15.1 % (ref 0–13.4)
NEUTROPHILS # BLD AUTO: 6.1 K/UL (ref 2–7.15)
NEUTROPHILS NFR BLD: 70.6 % (ref 44–72)
NRBC # BLD AUTO: 0 K/UL
NRBC BLD-RTO: 0 /100 WBC
PLATELET # BLD AUTO: 231 K/UL (ref 164–446)
PMV BLD AUTO: 10.9 FL (ref 9–12.9)
POTASSIUM SERPL-SCNC: 3.8 MMOL/L (ref 3.6–5.5)
PROT SERPL-MCNC: 6.5 G/DL (ref 6–8.2)
RBC # BLD AUTO: 3.92 M/UL (ref 4.2–5.4)
SARS-COV-2 RNA RESP QL NAA+PROBE: NOTDETECTED
SODIUM SERPL-SCNC: 139 MMOL/L (ref 135–145)
SPECIMEN SOURCE: NORMAL
WBC # BLD AUTO: 8.6 K/UL (ref 4.8–10.8)

## 2020-05-12 PROCEDURE — U0004 COV-19 TEST NON-CDC HGH THRU: HCPCS

## 2020-05-12 PROCEDURE — 99223 1ST HOSP IP/OBS HIGH 75: CPT | Mod: AI | Performed by: PHYSICAL MEDICINE & REHABILITATION

## 2020-05-12 PROCEDURE — A9270 NON-COVERED ITEM OR SERVICE: HCPCS | Performed by: HOSPITALIST

## 2020-05-12 PROCEDURE — 71045 X-RAY EXAM CHEST 1 VIEW: CPT

## 2020-05-12 PROCEDURE — A9270 NON-COVERED ITEM OR SERVICE: HCPCS | Performed by: INTERNAL MEDICINE

## 2020-05-12 PROCEDURE — 99239 HOSP IP/OBS DSCHRG MGMT >30: CPT | Performed by: INTERNAL MEDICINE

## 2020-05-12 PROCEDURE — 700102 HCHG RX REV CODE 250 W/ 637 OVERRIDE(OP): Performed by: HOSPITALIST

## 2020-05-12 PROCEDURE — A9270 NON-COVERED ITEM OR SERVICE: HCPCS | Performed by: PHYSICAL MEDICINE & REHABILITATION

## 2020-05-12 PROCEDURE — 80053 COMPREHEN METABOLIC PANEL: CPT

## 2020-05-12 PROCEDURE — 700102 HCHG RX REV CODE 250 W/ 637 OVERRIDE(OP): Performed by: INTERNAL MEDICINE

## 2020-05-12 PROCEDURE — G2023 SPECIMEN COLLECT COVID-19: HCPCS | Performed by: INTERNAL MEDICINE

## 2020-05-12 PROCEDURE — 700102 HCHG RX REV CODE 250 W/ 637 OVERRIDE(OP): Performed by: FAMILY MEDICINE

## 2020-05-12 PROCEDURE — 85025 COMPLETE CBC W/AUTO DIFF WBC: CPT

## 2020-05-12 PROCEDURE — 700102 HCHG RX REV CODE 250 W/ 637 OVERRIDE(OP): Performed by: PHYSICAL MEDICINE & REHABILITATION

## 2020-05-12 PROCEDURE — 36415 COLL VENOUS BLD VENIPUNCTURE: CPT

## 2020-05-12 PROCEDURE — 94760 N-INVAS EAR/PLS OXIMETRY 1: CPT

## 2020-05-12 PROCEDURE — 770010 HCHG ROOM/CARE - REHAB SEMI PRIVAT*

## 2020-05-12 PROCEDURE — 87502 INFLUENZA DNA AMP PROBE: CPT

## 2020-05-12 PROCEDURE — 700111 HCHG RX REV CODE 636 W/ 250 OVERRIDE (IP): Performed by: HOSPITALIST

## 2020-05-12 PROCEDURE — A9270 NON-COVERED ITEM OR SERVICE: HCPCS | Performed by: FAMILY MEDICINE

## 2020-05-12 RX ORDER — ECHINACEA PURPUREA EXTRACT 125 MG
2 TABLET ORAL PRN
Status: DISCONTINUED | OUTPATIENT
Start: 2020-05-12 | End: 2020-05-26 | Stop reason: HOSPADM

## 2020-05-12 RX ORDER — ATORVASTATIN CALCIUM 80 MG/1
80 TABLET, FILM COATED ORAL NIGHTLY
Status: CANCELLED | OUTPATIENT
Start: 2020-05-12

## 2020-05-12 RX ORDER — TRAZODONE HYDROCHLORIDE 50 MG/1
50 TABLET ORAL
Status: DISCONTINUED | OUTPATIENT
Start: 2020-05-12 | End: 2020-05-26 | Stop reason: HOSPADM

## 2020-05-12 RX ORDER — TRAMADOL HYDROCHLORIDE 50 MG/1
50 TABLET ORAL EVERY 4 HOURS PRN
Status: DISCONTINUED | OUTPATIENT
Start: 2020-05-12 | End: 2020-05-26 | Stop reason: HOSPADM

## 2020-05-12 RX ORDER — ALUMINA, MAGNESIA, AND SIMETHICONE 2400; 2400; 240 MG/30ML; MG/30ML; MG/30ML
20 SUSPENSION ORAL
Status: DISCONTINUED | OUTPATIENT
Start: 2020-05-12 | End: 2020-05-26 | Stop reason: HOSPADM

## 2020-05-12 RX ORDER — AMOXICILLIN 250 MG
2 CAPSULE ORAL 2 TIMES DAILY
Status: DISCONTINUED | OUTPATIENT
Start: 2020-05-12 | End: 2020-05-24

## 2020-05-12 RX ORDER — OXYCODONE HYDROCHLORIDE 10 MG/1
10 TABLET ORAL
Status: DISCONTINUED | OUTPATIENT
Start: 2020-05-12 | End: 2020-05-26 | Stop reason: HOSPADM

## 2020-05-12 RX ORDER — MIRTAZAPINE 15 MG/1
7.5 TABLET, ORALLY DISINTEGRATING ORAL
Status: DISCONTINUED | OUTPATIENT
Start: 2020-05-12 | End: 2020-05-26 | Stop reason: HOSPADM

## 2020-05-12 RX ORDER — ONDANSETRON 4 MG/1
4 TABLET, ORALLY DISINTEGRATING ORAL 4 TIMES DAILY PRN
Status: DISCONTINUED | OUTPATIENT
Start: 2020-05-12 | End: 2020-05-26 | Stop reason: HOSPADM

## 2020-05-12 RX ORDER — POLYETHYLENE GLYCOL 3350 17 G/17G
1 POWDER, FOR SOLUTION ORAL
Status: DISCONTINUED | OUTPATIENT
Start: 2020-05-12 | End: 2020-05-24

## 2020-05-12 RX ORDER — ALPRAZOLAM 0.25 MG/1
0.25 TABLET ORAL ONCE
Status: COMPLETED | OUTPATIENT
Start: 2020-05-12 | End: 2020-05-12

## 2020-05-12 RX ORDER — LISINOPRIL 5 MG/1
15 TABLET ORAL
Status: CANCELLED | OUTPATIENT
Start: 2020-05-13

## 2020-05-12 RX ORDER — LEVOTHYROXINE SODIUM 0.07 MG/1
75 TABLET ORAL
Status: DISCONTINUED | OUTPATIENT
Start: 2020-05-13 | End: 2020-05-26 | Stop reason: HOSPADM

## 2020-05-12 RX ORDER — ATORVASTATIN CALCIUM 40 MG/1
80 TABLET, FILM COATED ORAL NIGHTLY
Status: DISCONTINUED | OUTPATIENT
Start: 2020-05-12 | End: 2020-05-26 | Stop reason: HOSPADM

## 2020-05-12 RX ORDER — CARVEDILOL 12.5 MG/1
12.5 TABLET ORAL 2 TIMES DAILY WITH MEALS
Status: CANCELLED | OUTPATIENT
Start: 2020-05-12

## 2020-05-12 RX ORDER — POLYVINYL ALCOHOL 14 MG/ML
1 SOLUTION/ DROPS OPHTHALMIC PRN
Status: DISCONTINUED | OUTPATIENT
Start: 2020-05-12 | End: 2020-05-26 | Stop reason: HOSPADM

## 2020-05-12 RX ORDER — LEVOTHYROXINE SODIUM 0.07 MG/1
75 TABLET ORAL
Status: CANCELLED | OUTPATIENT
Start: 2020-05-13

## 2020-05-12 RX ORDER — HYDRALAZINE HYDROCHLORIDE 25 MG/1
25 TABLET, FILM COATED ORAL EVERY 8 HOURS PRN
Status: DISCONTINUED | OUTPATIENT
Start: 2020-05-12 | End: 2020-05-26 | Stop reason: HOSPADM

## 2020-05-12 RX ORDER — LISINOPRIL 5 MG/1
15 TABLET ORAL
Status: DISCONTINUED | OUTPATIENT
Start: 2020-05-13 | End: 2020-05-13

## 2020-05-12 RX ORDER — ACETAMINOPHEN 325 MG/1
650 TABLET ORAL EVERY 4 HOURS PRN
Status: DISCONTINUED | OUTPATIENT
Start: 2020-05-12 | End: 2020-05-26 | Stop reason: HOSPADM

## 2020-05-12 RX ORDER — CARVEDILOL 12.5 MG/1
12.5 TABLET ORAL 2 TIMES DAILY WITH MEALS
Status: DISCONTINUED | OUTPATIENT
Start: 2020-05-12 | End: 2020-05-26 | Stop reason: HOSPADM

## 2020-05-12 RX ORDER — ONDANSETRON 2 MG/ML
4 INJECTION INTRAMUSCULAR; INTRAVENOUS 4 TIMES DAILY PRN
Status: DISCONTINUED | OUTPATIENT
Start: 2020-05-12 | End: 2020-05-26 | Stop reason: HOSPADM

## 2020-05-12 RX ORDER — OXYCODONE HYDROCHLORIDE 5 MG/1
5 TABLET ORAL
Status: DISCONTINUED | OUTPATIENT
Start: 2020-05-12 | End: 2020-05-26 | Stop reason: HOSPADM

## 2020-05-12 RX ORDER — OMEPRAZOLE 20 MG/1
20 CAPSULE, DELAYED RELEASE ORAL DAILY
Status: DISCONTINUED | OUTPATIENT
Start: 2020-05-13 | End: 2020-05-26 | Stop reason: HOSPADM

## 2020-05-12 RX ORDER — BISACODYL 10 MG
10 SUPPOSITORY, RECTAL RECTAL
Status: DISCONTINUED | OUTPATIENT
Start: 2020-05-12 | End: 2020-05-24

## 2020-05-12 RX ADMIN — LORAZEPAM 0.5 MG: 1 TABLET ORAL at 06:12

## 2020-05-12 RX ADMIN — CARVEDILOL 12.5 MG: 12.5 TABLET, FILM COATED ORAL at 17:26

## 2020-05-12 RX ADMIN — CARVEDILOL 12.5 MG: 12.5 TABLET, FILM COATED ORAL at 04:33

## 2020-05-12 RX ADMIN — LEVOTHYROXINE SODIUM 75 MCG: 75 TABLET ORAL at 04:33

## 2020-05-12 RX ADMIN — DOCUSATE SODIUM 50 MG AND SENNOSIDES 8.6 MG 2 TABLET: 8.6; 5 TABLET, FILM COATED ORAL at 20:18

## 2020-05-12 RX ADMIN — ASPIRIN 81 MG: 81 TABLET, COATED ORAL at 04:33

## 2020-05-12 RX ADMIN — TICAGRELOR 90 MG: 90 TABLET ORAL at 04:34

## 2020-05-12 RX ADMIN — ATORVASTATIN CALCIUM 80 MG: 40 TABLET, FILM COATED ORAL at 20:19

## 2020-05-12 RX ADMIN — TICAGRELOR 90 MG: 90 TABLET ORAL at 20:19

## 2020-05-12 RX ADMIN — ENOXAPARIN SODIUM 40 MG: 100 INJECTION SUBCUTANEOUS at 04:35

## 2020-05-12 RX ADMIN — MIRTAZAPINE 7.5 MG: 15 TABLET, ORALLY DISINTEGRATING ORAL at 20:19

## 2020-05-12 RX ADMIN — LISINOPRIL 15 MG: 5 TABLET ORAL at 04:32

## 2020-05-12 ASSESSMENT — PATIENT HEALTH QUESTIONNAIRE - PHQ9
2. FEELING DOWN, DEPRESSED, IRRITABLE, OR HOPELESS: NOT AT ALL
SUM OF ALL RESPONSES TO PHQ9 QUESTIONS 1 AND 2: 0
SUM OF ALL RESPONSES TO PHQ9 QUESTIONS 1 AND 2: 0
2. FEELING DOWN, DEPRESSED, IRRITABLE, OR HOPELESS: NOT AT ALL
SUM OF ALL RESPONSES TO PHQ9 QUESTIONS 1 AND 2: 0
1. LITTLE INTEREST OR PLEASURE IN DOING THINGS: NOT AT ALL
2. FEELING DOWN, DEPRESSED, IRRITABLE, OR HOPELESS: NOT AT ALL
1. LITTLE INTEREST OR PLEASURE IN DOING THINGS: NOT AT ALL

## 2020-05-12 ASSESSMENT — LIFESTYLE VARIABLES
EVER_SMOKED: NEVER
AVERAGE NUMBER OF DAYS PER WEEK YOU HAVE A DRINK CONTAINING ALCOHOL: 0
TOTAL SCORE: 0
TOTAL SCORE: 0
EVER FELT BAD OR GUILTY ABOUT YOUR DRINKING: NO
ON A TYPICAL DAY WHEN YOU DRINK ALCOHOL HOW MANY DRINKS DO YOU HAVE: 0
EVER HAD A DRINK FIRST THING IN THE MORNING TO STEADY YOUR NERVES TO GET RID OF A HANGOVER: NO
CONSUMPTION TOTAL: NEGATIVE
ALCOHOL_USE: NO
HAVE PEOPLE ANNOYED YOU BY CRITICIZING YOUR DRINKING: NO
HOW MANY TIMES IN THE PAST YEAR HAVE YOU HAD 5 OR MORE DRINKS IN A DAY: 0
HAVE YOU EVER FELT YOU SHOULD CUT DOWN ON YOUR DRINKING: NO
TOTAL SCORE: 0

## 2020-05-12 ASSESSMENT — FIBROSIS 4 INDEX: FIB4 SCORE: 2.3

## 2020-05-12 NOTE — DISCHARGE PLANNING
Received Choice form at 7744  Agency/Facility Name: [facility], [facility], [facility], [facility], [facility], [facility], [facility],   Referral sent per Choice form @ 5448

## 2020-05-12 NOTE — DOCUMENTATION QUERY
[facility]                                                                       Query Response Note      PATIENT:               HARSHAL CASILLAS  ACCT #:                  [id number]  MRN:                     [id number]  :                      1940  ADMIT DATE:       2020 3:10 PM  DISCH DATE:          RESPONDING  PROVIDER #:        [id number]           QUERY TEXT:    Heart Failure is documented in the Medical Record. Please document the type and acuity (includes probable or suspected).     NOTE:  If an appropriate response is not listed below, please respond with a new note.    The patient's Clinical Indicators include:  DC Summary: Reason for admission- Heart failure. chest  x-ray revealed pulmonary edema   MD PN: findings of new congestive heart failure   Echo results: LV EF 65%, Grade ll diastolic dysfunction  CXR:  Treatment: Lasix, Echo, CXR, Heart cath w/stents, Coreg, Card consult  Risk Factors: Advanced age, ACS, HTN  Options provided:   -- Acute Systolic heart failure   -- Chronic Systolic heart failure   -- Acute on Chronic Systolic heart failure   -- Acute Diastolic heart failure   -- Chronic Diastolic heart failure   -- Acute on Chronic Diastolic heart failure   -- Acute Systolic and Diastolic heart failure   -- Chronic Systolic and Diastolic heart failure   -- Acute on Chronic Systolic and diastolic heart failure   -- Heart failure is ruled out   -- Unable to determine      Query created by: Sarita Thompson on 2020 9:51 AM    RESPONSE TEXT:    Heart failure is ruled out          Electronically signed by:  FRANCHESKA DISLA MD 2020 5:49 PM

## 2020-05-12 NOTE — DISCHARGE PLANNING
Hospital Care Management Discharge Planning Note       Anticipated Discharge Disposition:  · Acute IRF vs SNF     Action:  · This RN CM spoke with with NOK over the phone, per patient request, to obtain acute IRF and SNF choice.   · Per choice form, pt's preference is as follows:  · Acute IRF  · (1) [facility].  · (2) [facility].  · SNF  · [facility], [facility].   · This RN CM faxed choice form to ANNE Font[facility].  · Fax confirmation received at [phone].     Barriers to Discharge:  · Rehab/SNF acceptance and bed availability.     Plan:  · Follow up with [facility] and treatment team.       For further assistance please contact the RN Case Manager at [phone].

## 2020-05-12 NOTE — THERAPY
Occupational Therapy Contact Note  Attempted to see for OT session. Pt d/cing to [facility] this PM. Will continue to follow in case of change in d/c plan.   Anu VALDOVINOS, OTR/L    Pager #[phone]

## 2020-05-12 NOTE — PROGRESS NOTES
Pt dc'd [facility]. IV and monitor removed; monitor room notified. Pt left unit via wheelchair with escort. Personal belongings with pt when leaving unit. Pt given discharge instructions prior to leaving unit including prescription and when to visit with physician; verbalizes understanding. Copy of discharge instructions with pt and in the chart. Report given to Z[facility].

## 2020-05-12 NOTE — FLOWSHEET NOTE
05/12/20 1538   Events/Summary/Plan   Events/Summary/Plan RT assessment   Vital Signs   Pulse 64   Respiration 18   Pulse Oximetry 95 %   $ Pulse Oximetry (Spot Check) Yes   Respiratory Assessment   Level of Consciousness Alert   Chest Exam   Work Of Breathing / Effort Shallow   Breath Sounds   RUL Breath Sounds Diminished   RML Breath Sounds Diminished   RLL Breath Sounds Diminished   VY Breath Sounds Diminished   LLL Breath Sounds Diminished   Oxygen   O2 Delivery Device None - Room Air   Smoking History   Have you ever smoked Never

## 2020-05-12 NOTE — DISCHARGE SUMMARY
"Discharge Summary    CHIEF COMPLAINT ON ADMISSION  No chief complaint on file.      Reason for Admission  Heart Failure     CODE STATUS  Full Code    HPI & HOSPITAL COURSE  Ms Boyce is an 79 y.o. female who presented 5/7/2020 with chest pain.  Ms. Boyce has a past medical history of previous myocardial infarction secondary to severe anemia from acute blood loss due to a gynecologic malignancy though never had a heart catheterization.  She states she was in her usual state of health though \"I have been really tired\" for the past few weeks.  About 2 weeks ago she states she had a \"burning\" in her upper chest that went up into her mouth and then had the same symptoms 2 days ago.  She called 911 is brought to [facility] and was monitored there.  She was given Plavix, statin, beta-blocker, aspirin and had echocardiogram revealing ejection fraction 46% with apical akinesis.  Her troponins were 0.26 on admission and then went up to 2.05 then to 3.65 and trended down.  Her chest x-ray revealed pulmonary edema and she was given IV Lasix.  She has been transferred here for heart catheterization.     Patient underwent a left heart catherization with Dr. Olivas on 5/8/2020; stents placed in mid-LAD and proximal LAD. Patient tolerated the procedure.    Patient seen and examined prior to d/c. Patient reported to have a fever overnight. Influenza and COVID19 swab obtained and resulted NEGATIVE for both. All questions and concerns answered. Patient to be d/c to [facility]. Patient highly recommended to follow up with PCP and Cardiology once d/c from Rehab.        Therefore, she is discharged in good and stable condition to skilled nursing facility.    The patient met 2-midnight criteria for an inpatient stay at the time of discharge.      FOLLOW UP ITEMS POST DISCHARGE  05/12/20  Please call [phone] to schedule PCP appointment for patient.    Required specialty appointments include:       Discharge " Instructions per JALEEL AyalaD/C to [facility]  -Follow up with PCP  -Follow up with Cardiology  -Resume medications from acute to rehab    DIET: cardiac    ACTIVITY: as tolerated    DIAGNOSIS: chest pain    Return to ER if symptoms persists, severe chest pain, palpitations, shortness of breath, numbness, tingling and weakness.    DISCHARGE DIAGNOSES  Principal Problem (Resolved):    ACS (acute coronary syndrome) (HCC) POA: Yes  Active Problems:    Acquired hypothyroidism POA: Yes    Essential hypertension POA: Yes  Resolved Problems:    Fever POA: Unknown      FOLLOW UP  No future appointments.  Sandie Flores M.D.  [address] [facility] [address] [phone]    Schedule an appointment as soon as possible for a visit in 1 week  For follow-up. Please gain a referral to Cardiology and schedule an appointment.       MEDICATIONS ON DISCHARGE     Medication List      START taking these medications      Instructions   amLODIPine 10 MG Tabs  Commonly known as:  NORVASC   Take 1 Tab by mouth every day.  Dose:  10 mg     ticagrelor 90 MG Tabs tablet  Commonly known as:  BRILINTA   Take 1 Tab by mouth 2 Times a Day.  Dose:  90 mg        CHANGE how you take these medications      Instructions   * aspirin EC 81 MG Tbec  What changed:  Another medication with the same name was added. Make sure you understand how and when to take each.  Commonly known as:  ECOTRIN   Take 81 mg by mouth every day.  Dose:  81 mg     * aspirin 81 MG EC tablet  What changed:  You were already taking a medication with the same name, and this prescription was added. Make sure you understand how and when to take each.   Take 1 Tab by mouth every day.  Dose:  81 mg     carvedilol 12.5 MG Tabs  What changed:    · medication strength  · how much to take  Commonly known as:  COREG   Take 1 Tab by mouth 2 times a day, with meals.  Dose:  12.5 mg     oxyCODONE-acetaminophen 7.5-325 MG per tablet  What changed:  Another  medication with the same name was removed. Continue taking this medication, and follow the directions you see here.  Commonly known as:  PERCOCET   Take 1-2 Tabs by mouth every 6 hours as needed for Severe Pain (pain) for up to 3 days.  Dose:  1-2 Tab         * This list has 2 medication(s) that are the same as other medications prescribed for you. Read the directions carefully, and ask your doctor or other care provider to review them with you.            CONTINUE taking these medications      Instructions   atorvastatin 20 MG Tabs  Commonly known as:  LIPITOR   Take 80 mg by mouth every evening.  Dose:  80 mg     bumetanide 1 MG Tabs  Commonly known as:  BUMEX   Take 1 mg by mouth every day.  Dose:  1 mg     celecoxib 200 MG Caps  Commonly known as:  CELEBREX   Take 200 mg by mouth 2 times a day.  Dose:  200 mg     indomethacin 25 MG Caps  Commonly known as:  INDOCIN   Take 25 mg by mouth 3 times a day.  Dose:  25 mg     levothyroxine 75 MCG Tabs  Commonly known as:  SYNTHROID   Take 75 mcg by mouth Every morning on an empty stomach.  Dose:  75 mcg     metoclopramide 10 MG Tabs  Commonly known as:  REGLAN   Take 1 Tab by mouth every 6 hours as needed (nausea/emesis).  Dose:  10 mg     mirtazapine 15 MG Tabs  Commonly known as:  REMERON   Take 7.5 mg by mouth every evening.  Dose:  7.5 mg     Non Formulary Request   Take 1 tablet by mouth. Indications: Tart cherry  Dose:  1 tablet     Non Formulary Request   Take 1 tablet by mouth. Indications: Vit Code  Dose:  1 tablet     potassium chloride 20 MEQ Pack  Commonly known as:  KLOR-CON   Take 10 mEq by mouth every day.  Dose:  10 mEq     valsartan 80 MG Tabs  Commonly known as:  DIOVAN   Take 40 mg by mouth 2 times a day. 2 tabs in the AM and 1 tab in PM  Dose:  40 mg     vitamin D (Ergocalciferol) 1.25 MG (13600 UT) Caps capsule  Commonly known as:  DRISDOL   Take 50,000 Units by mouth every 7 days.  Dose:  50,000 Units        STOP taking these medications     cloNIDine 0.1 MG Tabs  Commonly known as:  CATAPRES     tramadol 50 MG Tabs  Commonly known as:  ULTRAM            Allergies  Allergies   Allergen Reactions   • Allopurinol      Rash, swollen       DIET  Orders Placed This Encounter   Procedures   • Diet Order Cardiac     Standing Status:   Standing     Number of Occurrences:   1     Order Specific Question:   Diet:     Answer:   Cardiac [6]       ACTIVITY  As tolerated.  Weight bearing as tolerated    LINES, DRAINS, AND WOUNDS  This is an automated list. Peripheral IVs will be removed prior to discharge.                     MENTAL STATUS ON TRANSFER  Level of Consciousness: Alert  Orientation : Oriented x 4  Speech: Speech Clear    CONSULTATIONS  Cardiology    PROCEDURES  LEFT heart catherization with stent placement    IMAGING  DX-CHEST-LIMITED (1 VIEW)   Final Result      Linear bilateral atelectasis or scarring.      Cardiomegaly.      Atherosclerotic plaque.            DX-FOOT-COMPLETE 3+ LEFT   Final Result      1.  There is diffuse left foot swelling. There is no soft tissue gas or foreign body.   2.  There is no acute bony process.      EC-ECHOCARDIOGRAM COMPLETE W/O CONT   Final Result      CL-LEFT HEART CATHETERIZATION WITH POSSIBLE INTERVENTION    (Results Pending)         LABORATORY  Lab Results   Component Value Date    SODIUM 139 05/12/2020    POTASSIUM 3.8 05/12/2020    CHLORIDE 105 05/12/2020    CO2 20 05/12/2020    GLUCOSE 114 (H) 05/12/2020    BUN 23 (H) 05/12/2020    CREATININE 0.86 05/12/2020        Lab Results   Component Value Date    WBC 8.6 05/12/2020    HEMOGLOBIN 12.2 05/12/2020    HEMATOCRIT 36.8 (L) 05/12/2020    PLATELETCT 231 05/12/2020        Total time of the discharge process exceeds 36  minutes.  -------------------------------------------------------------------------------------------------------------------------------------------------------------------------------------------------------------------------------------------------------------------------------------------------------------------------------------------  Please note that this dictation was created using voice recognition software. I have made every reasonable attempt to correct obvious errors, but there may be errors of grammar and possibly content that I did not discover before finalizing the note.    Electronically signed by:  ERI Badillo, MSN, APRN, FNP-C  Hospitalist Services  [facility]  [phone]  [email or web address]  05/12/20    5833

## 2020-05-12 NOTE — PREADMISSION SCREENING NOTE
Pre-Admission Screening Form    Patient Information:   Name: Sena Boyce     MRN: [id number]       : 1940      Age: 79 y.o.   Gender: female      Race: White [7]       Marital Status:  [2]  Family Contact: Shira Boyce Molly Foster,Mason        Relationship: Spouse [17]  Daughter [2]  Son [15]  Home Phone: [phone]           Cell Phone:       Advanced Directives: None  Code Status:  FULL  Current Attending Provider: Anton Nunez M.D.  Referring Physician:     Physiatrist Consult: Dr. Osborn        Referral Date: 2020  Primary Payor Source:  MEDICARE  Secondary Payor Source:  [facility]    Medical Information:   Date of Admission to Acute Care [place]2020  Room Number: T811/02  Rehabilitation Diagnosis: 09 Cardiac    There is no immunization history on file for this patient.  Allergies   Allergen Reactions   • Allopurinol      Rash, swollen     Past Medical History:   Diagnosis Date   • Gout    • Heart attack (HCC)     [id number]   • High blood pressure    • History of cholecystectomy    • Hypercholesteremia    • Hypothyroidism    • Polio     as a kid     Past Surgical History:   Procedure Laterality Date   • HYSTERECTOMY ROBOTIC  2015    Performed by Chuck Palmer M.D. at SURGERY [facility] ORS   • OOPHORECTOMY  2015    Performed by Chuck Palmer M.D. at SURGERY [facility]       History Leading to Admission, Conditions that Caused the Need for Rehab (CMS):     Shoaib Hubbard M.D.   Physician   [facility] Medicine   H&P   Signed   Date of Service:  2020  5:41 PM                        Hospital Medicine History & Physical Note     Date of Service  2020     Primary Care Physician  Sandie Flores M.D.     Code Status  Full Code     Chief Complaint  Chest pain     History of Presenting Illness  79 y.o. female who presented 2020 with chest pain.  Ms. Boyce has a past medical history of previous myocardial infarction  "secondary to severe anemia from acute blood loss due to a gynecologic malignancy though never had a heart catheterization.  She states she was in her usual state of health though \"I have been really tired\" for the past few weeks.  About 2 weeks ago she states she had a \"burning\" in her upper chest that went up into her mouth and then had the same symptoms 2 days ago.  She called 911 is brought to [facility] and was monitored there.  She was given Plavix, statin, beta-blocker, aspirin and had echocardiogram revealing ejection fraction 46% with apical akinesis.  Her troponins were 0.26 on admission and then went up to 2.05 then to 3.65 and trended down.  Her chest x-ray revealed pulmonary edema and she was given IV Lasix.  She has been transferred here for heart catheterization.  I have reviewed the discharge summary from Dr. Sandie Flores MD.   Ms. Boyce is amenable to a heart cath in the morning.     Of note, patient has not had a cough, is only felt short of breath when she had her chest pain otherwise not had any other breathing problems, has not had a fever, has not had changes in her taste nor her smell and she denies fever and body aches.  She denies any contact with persons known to have COVID        Assessment/Plan:        * ACS (acute coronary syndrome) (HCC)- (present on admission)  Assessment & Plan  Patient has a known history of coronary disease and transferred from [facility] with a troponin of 3.65 without dynamic EKG changes.  Echocardiogram that revealed apical akinesis with ejection fraction 46%.  She was loaded with Plavix, aspirin, statin prior to transfer.  Continue her on a beta-blocker.  She is n.p.o. at midnight for heart catheterization in the morning  Continuous telemetry monitoring.  A troponin has been ordered for the morning  Patient has made it very clear to me that she is amenable to heart catheterization but under no circumstances will she undergo open heart " surgery.     Essential hypertension- (present on admission)  Assessment & Plan  Continue coreg  Follow blood pressure     Acquired hypothyroidism- (present on admission)  Assessment & Plan  Continue synthroid 75 mcg daily                   Anthony Jiang M.D.   Physician   Cardiology   Consults   Signed   Date of Service:  5/7/2020  4:53 PM                          Cardiology Initial Consultation     Date of Service  5/7/2020     Referring Physician  James Younger M.D.     Reason for Consultation  Transfer from [facility] for chest pain, cardiac catheterization        Assessment/Plan  1. Chest pain: She is experiencing chest pain as described.  She was transferred from Dr. Lombard, Timothy at [facility] for cardiac catheterization. We will schedule for this procedure for tomorrow.  She understands the risks and benefits and agrees with plan.  2. Hyperlipidemia: She is doing well on statin therapy without myalgia symptoms.     The risks, benefits, and alternatives to coronary angiography with IV sedation were discussed in great detail. Specific risks mentioned include bleeding, infection, kidney damage, allergic reaction, cardiac perforation with possible tamponade requiring sue-cardiocentesis or possible open heart surgery. In addition, we discussed that 10% of patients will experience small to moderate bruising at the side of the arterial puncture. Lastly the risks of heart attack, stroke, and death were discussed; the risks of major complications such as heart attack or stroke caused by the angiogram is less than 1%; the risk of death is approximately 1 in 1000. The patient verbalized understanding of these potential complications and wishes to proceed with this procedure.     Thank you for allowing me to participate in the care of this patient.     I will continue to follow this patient     Please contact me with any questions.     Anthony Jiang M.D.   Cardiologist, [facility]  Heart and Vascular Health  [phone]    Russell Olivas M.D.   Physician   Interventional Cardiology   Procedures   Signed   Date of Service:  5/8/2020 10:27 AM                    []Hide copied text    []Michael for details  Cardiac Catheterization and Percutaneous Intervention Procedure Report     5/8/2020     Referring MD:      Primary Care Provider: AMANDA Anaya M.D.   Physician   Physical Medicine & Rehab   Consults   Signed   Date of Service:  5/11/2020  1:55 PM            Consult Orders   IP CONSULT FOR PHYSIATRY [[id number]] ordered by KORI Duran M.D. at 05/10/20 1027   IP Consult For Physiatry [[id number]] ordered by KORI Duran M.D. at 05/09/20 1252          Expand All Collapse All      []Hide copied text    []Michael for details                                                  Physical Medicine and Rehabilitation Consultation                                                                                      Initial Consult        Initial Consultation Date: 5/11/2020  Consulting provider: Dr. Carolyn Duran  Reason for consultation: assess for acute inpatient rehab appropriateness  LOS: 4 Day(s)        Chief complaint: leg weakness        HPI:   The patient is a 79 y.o. 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      The patient currently reports:   -Reports that she feels generalized weakness, but legs are weaker than arms  -Recalls she was in her facility in [facility] for her previous MI, prefers return there, since it is close to home and her daughter is staying at her home right now until possibly August 2020  -Does have TTP over bilateral lower extremities  -Bowel: 2x documented 5/10-5/11  -Bladder: voiding, denies  "issues  -Denies fever, chills, nausea, vomiting, SOB     Chart review shows patient had T100.5 fever and undergoing work up.            ROS:  Pertinent positives are listed in HPI, all other systems reviewed and are negative        Social Hx:  Pre-morbidly, this patient lived in:   1 story home  With 1 steps to enter  Lives independently; currently her daughter is staying with her, since daughter is a teacher and teaching remotely until 8/2020  Used to work as a teacher           Current level of function: The patient was evaluated by:  PT: 5/9, Mod A mobility, Min A transfers, Max A with FWW gait  OT: 5/10, Max A for LBD, Min A for UBD      Allergies:        Allergies   Allergen Reactions   • Allopurinol         Rash, swollen            Physical Exam:  Vitals: /75   Pulse 64   Temp 36.2 °C (97.2 °F) (Temporal)   Resp 20   Ht 1.626 m (5' 4.02\")   Wt 92.8 kg (204 lb 9.4 oz)   SpO2 94%      Gen: pleasant  Head: no visible head lesions or abrasion  Eyes/ Nose/ Mouth: moist mucous membranes  Cardio: Well perfused extremities  Pulm: on room air, with normal respiratory effort  Abd: Soft NTND  Skin: No visible rashes; +Peripheral IV over LUE  Ext: No atrophy of muscles, no joint contractures of extremities   Psych: answers questions appropriately follows commands, calm affect     Neuro:   -Speech: fluent, no aphasia or dysarthria  -Hearing and visual acuity functional and grossly intact     Motor: BLE strength testing limited by TTP with palpation   -Bilateral upper extremities: 4/5 with arm abduction, elbow flexion, elbow extension, finger flexion  -Bilateral lower extremities: 4/5 with hip flexion, knee extension              Labs: Reviewed and significant for 5/9 Hgb 12.7, Na 139, K3.9, Cr 0.96                Imaging:   Dx-foot-complete 3+ Left  Result Date: 5/11/2020 5/11/2020 12:22 PM HISTORY/REASON FOR EXAM:  Atraumatic left foot pain and swelling. TECHNIQUE/EXAM DESCRIPTION AND NUMBER OF VIEWS: 3 views " of the LEFT foot. COMPARISON:  None FINDINGS: There is diffuse left foot swelling. There is no soft tissue gas or foreign body. There is no evidence of displaced fracture or dislocation. The alignment is maintained. There is mild scattered degenerative change throughout the IP joints and the 1st MTP joint and 1st tarsometatarsal joint. There is no periostitis. Lucency involving the proximal 1st distal phalanx is of doubtful clinical significance. This may be an area  of prominent osteopenia.      1.  There is diffuse left foot swelling. There is no soft tissue gas or foreign body. 2.  There is no acute bony process.              Ec-echocardiogram Complete W/o Cont  Result Date: 5/8/2020  Transthoracic Echo Report Echocardiography Laboratory CONCLUSIONS No prior study is available for comparison. Mild concentric left ventricular hypertrophy. Left ventricular ejection fraction is visually estimated to be 65%. Grade II diastolic dysfunction. Mild aortic stenosis. Vmax is 2.8  m/s. Normal inferior vena cava size and inspiratory collapse.                        ASSESSMENT:  Patient is a 79 y.o. female admitted with ACS s/p 3x FELIPE stents on 5/8        # Rehabilitation: Impaired ADLs and mobility. Given good discharge support with daughter, she is a reasonable candidate for IRF. With that said, she prefers to discharge to [facility], where she's been there before for her previous MI. This is a reasonable option.    -Rehab Impairment Code: 09 Cardiac  -Reason for admission: ACS (acute coronary syndrome) (HCC)   -Fever work up on 5/11 and ongoing HTN requiring PRN IV hydralazine x2 days  -Anticipate discharge to [facility] when medically cleared per patient's request        #CV: HTN, ACS s/p cardiac cath and FELIPE stent x3 on 5/8, amlodipine DC’ed, Echo with EF46% with apical akinesis.atorvastatin  -coreg, Lisinopril, brilinta         #Pain: Chronic LLE post-polio syndrome related pain, denies significant  "change  -Tylenol  -oxycodone x1 5/10        #Hypothyroidism: Synthroid        #Bowel: 2x documented 5/10-5/11  -senna s       #Bladder: voiding  -denies issues       #DVT: lovenox, brilinta           Discussed with pt, summarized hospitalization and care, options for next step of care  Labs reviewed   Discussed with rehab team about recommendations         Thank you for allowing us to participate in the care of this patient.               Syed Osborn MD  Physical Medicine and Rehabilitation   5/11/2020                        Co-morbidities: as listedabove and below   Potential Risk - Complications: Deep Vein Thrombosis, Malnutrition, Pain, Paralysis and Pneumonia  Level of Risk: High    Ongoing Medical Management Needed (Medical/Nursing Needs):   Patient Active Problem List    Diagnosis Date Noted   • Fever 05/11/2020   • ACS (acute coronary syndrome) (HCC) 05/07/2020   • Acquired hypothyroidism 05/07/2020   • Essential hypertension 05/07/2020   • Postmenopausal vaginal bleeding 05/02/2015       Current Vital Signs:   Temperature: 37.2 °C (98.9 °F) Pulse: 64 Respiration: 16 Blood Pressure : 140/61  Weight: 95.2 kg (209 lb 14.1 oz) Height: 162.6 cm (5' 4.02\")  Pulse Oximetry: 96 % O2 (LPM): 0      Completed Laboratory Reports:  Recent Labs     05/12/20  0300   WBC 8.6   HEMOGLOBIN 12.2   HEMATOCRIT 36.8*   PLATELETCT 231   SODIUM 139   POTASSIUM 3.8   BUN 23*   CREATININE 0.86   ALBUMIN 3.2   GLUCOSE 114*     Additional Labs: Not Applicable    Prior Living Situation:   Housing / Facility: 2 Story House  Steps Into Home: 2  Steps In Home: 16  Lives with - Patient's Self Care Capacity: Adult Children  Equipment Owned: Front-Wheel Walker    Prior Level of Function / Living Situation:   Physical Therapy: Prior Services: Intermittent Physical Support for ADL Per Family  Housing / Facility: 2 Story House  Steps Into Home: 2  Steps In Home: 16  Equipment Owned: Front-Wheel Walker  Lives with - Patient's Self Care " Capacity: Adult Children  Bed Mobility: Independent  Transfer Status: Independent  Ambulation: Independent  Distance Ambulation (Feet): 30  Assistive Devices Used: Front-Wheel Walker  Current Level of Function:   Level Of Assist: Maximal Assist  Assistive Device: Front Wheel Walker  Distance (Feet): 5  Deviation: Other (Comment)(forward flexed, knee flexed)  # of Stairs Climbed: 0  Weight Bearing Status: fwb  Supine to Sit: Minimal Assist(therapist hands and HOB elevated)  Sit to Supine: (left in chair)  Scooting: Minimal Assist(use of therapist hands)  Sit to Stand: Minimal Assist  Bed, Chair, Wheelchair Transfer: Minimal Assist  Toilet Transfers: Minimal Assist(BSC only)  Transfer Method: Other (Comments)(stand step with v/cs for foot placement during turn)  Sitting in Chair: 30+ min left in chair and on BSC  Sitting Edge of Bed: 8 min  Standing: 3 min total  Occupational Therapy:   Self Feeding: Independent  Grooming / Hygiene: Independent  Bathing: Independent  Dressing: Independent  Toileting: Independent  Medication Management: Independent  Laundry: Independent  Kitchen Mobility: Independent  Finances: Independent  Home Management: Independent  Shopping: Independent  Prior Level Of Mobility: Independent With Device in Community, Independent With Device in Home  Driving / Transportation: Relatives / Others Provide Transportation  Prior Services: Intermittent Physical Support for ADL Per Family  Housing / Facility: [address]  Current Level of Function:   Upper Body Dressing: Minimal Assist  Lower Body Dressing: Maximal Assist(socks; reports wears only slippers at baseline)  Toileting: Minimal Assist(for balance during pericare)  Speech Language Pathology:      Rehabilitation Prognosis/Potential: Good  Estimated Length of Stay: 10-14 days    Nursing:   Orientation : Oriented x 4  Continent    Scope/Intensity of Services Recommended:  Physical Therapy: 1.5 hr / day  5 days / week. Therapeutic Interventions  Required: Maximize Endurance, Mobility, Strength and Safety  Occupational Therapy: 1.5 hr / day 5 days / week. Therapeutic Interventions Required: Maximize Self Care, ADLs, IADLs and Energy Conservation  Rehabilitation Nursin/7. Therapeutic Interventions Required: Monitor Pain, Skin, Vital Signs, Intake and Output, Labs, Safety and Family Training  Rehabilitation Physician: 3 - 5 days / week. Therapeutic Interventions Required: Medical Management  Respiratory Care: evaluate . Therapeutic Interventions Required: Pulmonary Toileting  Dietician: consult . Therapeutic Interventions Required: nutritional need     Rehabilitation Goals and Plan (Expected frequency & duration of treatment in the IRF):   Return to the Community and Modified Independent Level of Care  Anticipated Date of Rehabilitation Admission: 2020  Patient/Family oriented IRF level of care/facility/plan: Yes  Patient/Family willing to participate in IRF care/facility/plan: Yes  Patient able to tolerate IRF level of care proposed: Yes  Patient has potential to benefit IRF level of care proposed: Yes  Comments: Not Applicable    Special Needs or Precautions - Medical Necessity:  Safety Concerns/Precautions:  Fall Risk / High Risk for Falls and Balance  Pain Management  Cardiac Precautions  Current Medications:    Current Facility-Administered Medications Ordered in Epic   Medication Dose Route Frequency Provider Last Rate Last Dose   • lisinopril (PRINIVIL) tablet 15 mg  15 mg Oral Q DAY H Jason Duran M.D.   15 mg at 20 0432   • ticagrelor (BRILINTA) tablet 90 mg  90 mg Oral BID Russell Olivas M.D.   90 mg at 20 0434   • carvedilol (COREG) tablet 12.5 mg  12.5 mg Oral BID WITH MEALS Russell Olivas M.D.   12.5 mg at 20 0433   • ondansetron (ZOFRAN) syringe/vial injection 4 mg  4 mg Intravenous Q6HRS PRN H Jason Duran M.D.   4 mg at 20 1611   • atorvastatin (LIPITOR) tablet 80 mg  80 mg Oral Nightly Shoaib  AMBER Hubbard M.D.   80 mg at 05/11/20 2018   • levothyroxine (SYNTHROID) tablet 75 mcg  75 mcg Oral AM ES Shoaib Hubbard M.D.   75 mcg at 05/12/20 0433   • senna-docusate (PERICOLACE or SENOKOT S) 8.6-50 MG per tablet 2 Tab  2 Tab Oral BID Shoaib Hubbard M.D.   2 Tab at 05/11/20 1633    And   • polyethylene glycol/lytes (MIRALAX) PACKET 1 Packet  1 Packet Oral QDAY PRN Shoaib Hubbard M.D.        And   • magnesium hydroxide (MILK OF MAGNESIA) suspension 30 mL  30 mL Oral QDAY PRN Shoaib Hubbard M.D.        And   • bisacodyl (DULCOLAX) suppository 10 mg  10 mg Rectal QDAY PRN Shoaib Hubbard M.D.       • enoxaparin (LOVENOX) inj 40 mg  40 mg Subcutaneous DAILY Shoaib Hubbard M.D.   40 mg at 05/12/20 0435   • acetaminophen (TYLENOL) tablet 650 mg  650 mg Oral Q6HRS PRN Shoaib Hubbard M.D.       • oxyCODONE immediate-release (ROXICODONE) tablet 5-10 mg  5-10 mg Oral Q4HRS PRN Shoaib Hubbard M.D.   10 mg at 05/10/20 2048   • aspirin EC (ECOTRIN) tablet 81 mg  81 mg Oral DAILY Shoaib Hubbard M.D.   81 mg at 05/12/20 0433     Current Outpatient Medications Ordered in Epic   Medication Sig Dispense Refill   • oxyCODONE-acetaminophen (PERCOCET) 7.5-325 MG per tablet Take 1-2 Tabs by mouth every 6 hours as needed for Severe Pain (pain) for up to 3 days. 12 Tab 0   • carvedilol (COREG) 12.5 MG Tab Take 1 Tab by mouth 2 times a day, with meals. 60 Tab 0   • amLODIPine (NORVASC) 10 MG Tab Take 1 Tab by mouth every day. 30 Tab 0   • aspirin EC 81 MG EC tablet Take 1 Tab by mouth every day. 30 Tab 0   • ticagrelor (BRILINTA) 90 MG Tab tablet Take 1 Tab by mouth 2 Times a Day. 60 Tab 11     Diet:   DIET ORDERS (From admission to next 24h)     Start     Ordered    05/08/20 1106  Diet Order Cardiac  ALL MEALS     Question:  Diet:  Answer:  Cardiac    05/08/20 1105                Anticipated Discharge Destination / Patient/Family Goal:  Destination: Home with Assistance Support System: Family   Anticipated home health services: OT and  PT  Previously used HH service/ provider: Not Applicable  Anticipated DME Needs: Walker  Outpatient Services: PT  Alternative resources to address additional identified needs:     Pre-Screen Completed: 5/12/2020 11:24 AM Jeremi Peña

## 2020-05-12 NOTE — DISCHARGE PLANNING
Per CM choice for post acute services is [facility]. LILIAN completed KENA Longo reviewing for consideration for IRF level of care.

## 2020-05-12 NOTE — DISCHARGE PLANNING
Approved transfer to [facility] Dr. Longo accepting left voice mail with daughter unable to leave message with spouse CM aware. Transportation arranged for 1400.

## 2020-05-12 NOTE — DISCHARGE INSTRUCTIONS
Discharge Instructions    Discharged to other by [facility] with escort. Discharged via wheelchair, hospital escort: Yes.  Special equipment needed: Not Applicable    Be sure to schedule a follow-up appointment with your primary care doctor or any specialists as instructed.     Discharge Plan:        I understand that a diet low in cholesterol, fat, and sodium is recommended for good health. Unless I have been given specific instructions below for another diet, I accept this instruction as my diet prescription.   Other diet: Cardiac    Special Instructions: Diagnosis:  Acute Coronary Syndrome (ACS) is a diagnosis that encompasses cardiac-related chest pain and heart attack. ACS occurs when the blood flow to the heart muscle is severely reduced or cut off completely due to a slow process called atherosclerosis.  Atherosclerosis is a disease in which the coronary arteries become narrow from a buildup of fat, cholesterol, and other substances that combine to form plaque. If the plaque breaks, a blood clot will form and block the blood flow to the heart muscle. This lack of blood flow can cause damage or death to the heart muscle which is called a heart attack or Myocardial Infarction (MI). There are two different types of MIs:  ST Elevation Myocardial Infarction or STEMI (the most severe type of heart attack) and Non-ST Elevation Myocardial Infarction or NSTEMI.    Treatment Plan:  · Cardiac Diet  - Low fat, low salt, low cholesterol   · Cardiac Rehab  - Your doctor has ordered you a referral to [facility].  Call [phone] to schedule an appointment.  · Attend my follow-up appointment with my Cardiologist.  · Take my medications as prescribed by my doctor  · Exercise daily  · Quit Smoking, Lower my bad cholesterol and raise my good cholesterol, lower my blood pressure and Reduce stress    Medications:  Certain medications are used to treat ACS.  Remember to always take medications as prescribed and never stop talking  medications unless told by your doctor.    You have been prescribed the following medicatons:    Aspirin - Aspirin is used as a blood thinning medication and you will require this medication indefinitely.  Beta-Blocker - Beta-Blocker Carvedilol is used to lower blood pressure and heart rate, and/or helps your heart heal after a heart attack.  Statin - Statin Atorvastatin is used to lower cholesterol.    · Is patient discharged on Warfarin / Coumadin?   No       Acute Coronary Syndrome  Acute coronary syndrome (ACS) is a serious problem in which there is suddenly not enough blood and oxygen supplied to the heart. ACS may mean that one or more of the blood vessels in your heart (coronary arteries) may be blocked. ACS can result in chest pain or a heart attack (myocardial infarction or MI).  What are the causes?  This condition is caused by atherosclerosis, which is the buildup of fat and cholesterol (plaque) on the inside of the arteries. Over time, the plaque may narrow or block the artery, and this will lessen blood flow to the heart. Plaque can also become weak and break off within a coronary artery to form a clot and cause a sudden blockage.  What increases the risk?  The risk factors of this condition include:  · High cholesterol levels.  · High blood pressure (hypertension).  · Smoking.  · Diabetes.  · Age.  · Family history of chest pain, heart disease, or stroke.  · Lack of exercise.  What are the signs or symptoms?  The most common signs of this condition include:  · Chest pain, which can be:  ¨ A crushing or squeezing in the chest.  ¨ A tightness, pressure, fullness, or heaviness in the chest.  ¨ Present for more than a few minutes, or it can stop and recur.  · Pain in the arms, neck, jaw, or back.  · Unexplained heartburn or indigestion.  · Shortness of breath.  · Nausea.  · Sudden cold sweats.  · Feeling light-headed or dizzy.  Sometimes, this condition has no symptoms.  How is this diagnosed?  ACS may be  diagnosed through the following tests:  · Electrocardiogram (ECG).  · Blood tests.  · Coronary angiogram. This is a procedure to look at the coronary arteries to see if there is any blockage.  How is this treated?  Treatment for ACS may include:  · Healthy behavioral changes to reduce or control risk factors.  · Medicine.  · Coronary stenting. A stent helps to keep an artery open.  · Coronary angioplasty. This procedure widens a narrowed or blocked artery.  · Coronary artery bypass surgery. This will allow your blood to pass the blockage (bypass) to reach your heart.  Follow these instructions at home:  Eating and drinking  · Follow a heart-healthy diet. A dietitian can you help to educate you about healthy food options and changes.  · Use healthy cooking methods such as roasting, grilling, broiling, baking, poaching, steaming, or stir-frying. Talk to a dietitian to learn more about healthy cooking methods.  Medicines  · Take medicines only as directed by your health care provider.  · Do not take the following medicines unless your health care provider approves:  ¨ Nonsteroidal anti-inflammatory drugs (NSAIDs), such as ibuprofen, naproxen, or celecoxib.  ¨ Vitamin supplements that contain vitamin A, vitamin E, or both.  ¨ Hormone replacement therapy that contains estrogen with or without progestin.  · Stop illegal drug use.  Activity  · Follow an exercise program that is approved by your health care provider.  · Plan rest periods when you are fatigued.  Lifestyle  · Do not use any tobacco products, including cigarettes, chewing tobacco, or electronic cigarettes. If you need help quitting, ask your health care provider.  · If you drink alcohol, and your health care provider approves, limit your alcohol intake to no more than 1 drink per day. One drink equals 12 ounces of beer, 5 ounces of wine, or 1½ ounces of hard liquor.  · Learn to manage stress.  · Maintain a healthy weight. Lose weight as approved by your health  care provider.  General instructions  · Manage other health conditions, such as hypertension and diabetes, as directed by your health care provider.  · Keep all follow-up visits as directed by your health care provider. This is important.  · Your health care provider may ask you to monitor your blood pressure. A blood pressure reading consists of a higher number over a lower number, such as 110 over 72, written as 110/72. Ideally, your blood pressure should be:  ¨ Below 140/90 if you have no other medical conditions.  ¨ Below 130/80 if you have diabetes or kidney disease.  Get help right away if:  · You have pain in your chest, neck, arm, jaw, stomach, or back that lasts more than a few minutes, is recurring, or is not relieved by taking medicine under your tongue (sublingual nitroglycerin).  · You have profuse sweating without cause.  · You have unexplained:  ¨ Heartburn or indigestion.  ¨ Shortness of breath or difficulty breathing.  ¨ Nausea or vomiting.  ¨ Fatigue.  ¨ Feelings of nervousness or anxiety.  ¨ Weakness.  ¨ Diarrhea.  · You have sudden light-headedness or dizziness.  · You faint.  These symptoms may represent a serious problem that is an emergency. Do not wait to see if the symptoms will go away. Get medical help right away. Call your local emergency services (911 in the [place] Do not drive yourself to the clinic or hospital.   This information is not intended to replace advice given to you by your health care provider. Make sure you discuss any questions you have with your health care provider.  Document Released: 12/18/2006 Document Revised: 05/31/2017 Document Reviewed: 04/21/2015  Elsevier Interactive Patient Education © 2017 Elsevier Inc.      Angiogram, Care After  These instructions give you information about caring for yourself after your procedure. Your doctor may also give you more specific instructions. Call your doctor if you have any problems or questions after your procedure.   HOME  CARE  · Take medicines only as told by your doctor.  · Follow your doctor's instructions about:  ¨ Care of the area where the tube was inserted.  ¨ Bandage (dressing) changes and removal.  · You may shower 24-48 hours after the procedure or as told by your doctor.  · Do not take baths, swim, or use a hot tub until your doctor approves.  · Every day, check the area where the tube was inserted. Watch for:  ¨ Redness, swelling, or pain.  ¨ Fluid, blood, or pus.  · Do not apply powder or lotion to the site.  · Do not lift anything that is heavier than 10 lb (4.5 kg) for 5 days or as told by your doctor.  · Ask your doctor when you can:  ¨ Return to work or school.  ¨ Do physical activities or play sports.  ¨ Have sex.  · Do not drive or operate heavy machinery for 24 hours or as told by your doctor.  · Have someone with you for the first 24 hours after the procedure.  · Keep all follow-up visits as told by your doctor. This is important.  GET HELP IF:  · You have a fever.    · You have chills.    · You have more bleeding from the area where the tube was inserted. Hold pressure on the area.  · You have redness, swelling, or pain in the area where the tube was inserted.  · You have fluid or pus coming from the area.  GET HELP RIGHT AWAY IF:   · You have a lot of pain in the area where the tube was inserted.  · The area where the tube was inserted is bleeding, and the bleeding does not stop after 30 minutes of holding steady pressure on the area.  · The area near or just beyond the insertion site becomes pale, cool, tingly, or numb.     This information is not intended to replace advice given to you by your health care provider. Make sure you discuss any questions you have with your health care provider.     Document Released: 03/16/2010 Document Revised: 01/08/2016 Document Reviewed: 07/06/2015  [employer] Interactive Patient Education ©2016 [employer]    Amlodipine tablets  What is this medicine?  AMLODIPINE (am SHAYNE di  peen) is a calcium-channel blocker. It affects the amount of calcium found in your heart and muscle cells. This relaxes your blood vessels, which can reduce the amount of work the heart has to do. This medicine is used to lower high blood pressure. It is also used to prevent chest pain.  This medicine may be used for other purposes; ask your health care provider or pharmacist if you have questions.  COMMON BRAND NAME(S): Norvasc  What should I tell my health care provider before I take this medicine?  They need to know if you have any of these conditions:  -heart problems like heart failure or aortic stenosis  -liver disease  -an unusual or allergic reaction to amlodipine, other medicines, foods, dyes, or preservatives  -pregnant or trying to get pregnant  -breast-feeding  How should I use this medicine?  Take this medicine by mouth with a glass of water. Follow the directions on the prescription label. Take your medicine at regular intervals. Do not take more medicine than directed.  Talk to your pediatrician regarding the use of this medicine in children. Special care may be needed. This medicine has been used in children as young as 6.  Persons over 65 years old may have a stronger reaction to this medicine and need smaller doses.  Overdosage: If you think you have taken too much of this medicine contact a poison control center or emergency room at once.  NOTE: This medicine is only for you. Do not share this medicine with others.  What if I miss a dose?  If you miss a dose, take it as soon as you can. If it is almost time for your next dose, take only that dose. Do not take double or extra doses.  What may interact with this medicine?  -herbal or dietary supplements  -local or general anesthetics  -medicines for high blood pressure  -medicines for prostate problems  -rifampin  This list may not describe all possible interactions. Give your health care provider a list of all the medicines, herbs, non-prescription  drugs, or dietary supplements you use. Also tell them if you smoke, drink alcohol, or use illegal drugs. Some items may interact with your medicine.  What should I watch for while using this medicine?  Visit your doctor or health care professional for regular check ups. Check your blood pressure and pulse rate regularly. Ask your health care professional what your blood pressure and pulse rate should be, and when you should contact him or her.  This medicine may make you feel confused, dizzy or lightheaded. Do not drive, use machinery, or do anything that needs mental alertness until you know how this medicine affects you. To reduce the risk of dizzy or fainting spells, do not sit or stand up quickly, especially if you are an older patient. Avoid alcoholic drinks; they can make you more dizzy.  Do not suddenly stop taking amlodipine. Ask your doctor or health care professional how you can gradually reduce the dose.  What side effects may I notice from receiving this medicine?  Side effects that you should report to your doctor or health care professional as soon as possible:  -allergic reactions like skin rash, itching or hives, swelling of the face, lips, or tongue  -breathing problems  -changes in vision or hearing  -chest pain  -fast, irregular heartbeat  -swelling of legs or ankles  Side effects that usually do not require medical attention (report to your doctor or health care professional if they continue or are bothersome):  -dry mouth  -facial flushing  -nausea, vomiting  -stomach gas, pain  -tired, weak  -trouble sleeping  This list may not describe all possible side effects. Call your doctor for medical advice about side effects. You may report side effects to FDA at 1-225-FDA-7797.  Where should I keep my medicine?  Keep out of the reach of children.  Store at room temperature between 59 and 86 degrees F (15 and 30 degrees C). Protect from light. Keep container tightly closed. Throw away any unused  medicine after the expiration date.  NOTE: This sheet is a summary. It may not cover all possible information. If you have questions about this medicine, talk to your doctor, pharmacist, or health care provider.  © 2018 Elsevier/Gold Standard (2013-11-15 11:40:58)      Ticagrelor oral tablet  What is this medicine?  TICAGRELOR (JACKY ka GREL or) helps to prevent blood clots. This medicine is used to prevent heart attack, stroke, or other vascular events in people who have had a recent heart attack or who have severe chest pain.  This medicine may be used for other purposes; ask your health care provider or pharmacist if you have questions.  COMMON BRAND NAME(S): CUAUHTEMOC  What should I tell my health care provider before I take this medicine?  They need to know if you have any of these conditions:  -bleeding disorders  -bleeding in the brain  -having surgery  -history of irregular heartbeat  -history of stomach bleeding  -liver disease  -an unusual or allergic reaction to ticagrelor, other medicines, foods, dyes, or preservatives  -pregnant or trying to get pregnant  -breast-feeding  How should I use this medicine?  Take this medicine by mouth with a glass of water. Follow the directions on the prescription label. You can take it with or without food. If it upsets your stomach, take it with food. Take your medicine at regular intervals. Do not take it more often than directed. Do not stop taking except on your doctor's advice.  Talk to you pediatrician regarding the use of this medicine in children. Special care may be needed.  Overdosage: If you think you have taken too much of this medicine contact a poison control center or emergency room at once.  NOTE: This medicine is only for you. Do not share this medicine with others.  What if I miss a dose?  If you miss a dose, take it as soon as you can. If it is almost time for your next dose, take only that dose. Do not take double or extra doses.  What may interact with  this medicine?  -certain antibiotics like clarithromycin and telithromycin  -certain medicines for fungal infections like itraconazole, ketoconazole, and voriconazole  -certain medicines for HIV infection like atazanavir, indinavir, nelfinavir, ritonavir, and saquinavir  -certain medicines for seizures like carbamazepine, phenobarbital, and phenytoin  -certain medicines that treat or prevent blood clots like warfarin  -dexamethasone  -digoxin  -lovastatin  -nefazodone  -rifampin  -simvastatin  This list may not describe all possible interactions. Give your health care provider a list of all the medicines, herbs, non-prescription drugs, or dietary supplements you use. Also tell them if you smoke, drink alcohol, or use illegal drugs. Some items may interact with your medicine.  What should I watch for while using this medicine?  Visit your doctor or health care professional for regular check ups. Do not stop taking you medicine unless your doctor tells you to.  Notify your doctor or health care professional and seek emergency treatment if you develop breathing problems; changes in vision; chest pain; severe, sudden headache; pain, swelling, warmth in the leg; trouble speaking; sudden numbness or weakness of the face, arm, or leg. These can be signs that your condition has gotten worse.  If you are going to have surgery or dental work, tell your doctor or health care professional that you are taking this medicine.  You should take aspirin every day with this medicine. Do not take more than 100 mg each day. Talk to your doctor if you have questions.  What side effects may I notice from receiving this medicine?  Side effects that you should report to your doctor or health care professional as soon as possible:  -allergic reactions like skin rash, itching or hives, swelling of the face, lips, or tongue  -breathing problems  -fast or irregular heartbeat  -feeling faint or light-headed, falls  -signs and symptoms of  bleeding such as bloody or black, tarry stools; red or dark-brown urine; spitting up blood or brown material that looks like coffee grounds; red spots on the skin; unusual bruising or bleeding from the eye, gums, or nose  Side effects that usually do not require medical attention (report to your doctor or health care professional if they continue or are bothersome):  -breast enlargement in both males and females  -diarrhea  -dizziness  -headache  -tiredness  -upset stomach  This list may not describe all possible side effects. Call your doctor for medical advice about side effects. You may report side effects to FDA at 2-946-XDX-3046.  Where should I keep my medicine?  Keep out of the reach of children.  Store at room temperature of 59 to 86 degrees F (15 to 30 degrees C). Throw away any unused medicine after the expiration date.  NOTE: This sheet is a summary. It may not cover all possible information. If you have questions about this medicine, talk to your doctor, pharmacist, or health care provider.  © 2018 Elsevier/Gold Standard (2017-01-19 11:53:14)      Depression / Suicide Risk    As you are discharged from this [facility] facility, it is important to learn how to keep safe from harming yourself.    Recognize the warning signs:  · Abrupt changes in personality, positive or negative- including increase in energy   · Giving away possessions  · Change in eating patterns- significant weight changes-  positive or negative  · Change in sleeping patterns- unable to sleep or sleeping all the time   · Unwillingness or inability to communicate  · Depression  · Unusual sadness, discouragement and loneliness  · Talk of wanting to die  · Neglect of personal appearance   · Rebelliousness- reckless behavior  · Withdrawal from people/activities they love  · Confusion- inability to concentrate     If you or a loved one observes any of these behaviors or has concerns about self-harm, here's what you can do:  · Talk about  it- your feelings and reasons for harming yourself  · Remove any means that you might use to hurt yourself (examples: pills, rope, extension cords, firearm)  · Get professional help from the community (Mental Health, Substance Abuse, psychological counseling)  · Do not be alone:Call your Safe Contact- someone whom you trust who will be there for you.  · Call your local CRISIS HOTLINE 175-1575 or 275-583-5267  · Call your local Children's Mobile Crisis Response Team [place] (911) 580-0725 or www.prettysecrets  · Call the toll free National Suicide Prevention Hotlines   · National Suicide Prevention Lifeline 015-517-DMAW (4718)  · National Hope Line Network 800-SUICIDE (366-8759)

## 2020-05-12 NOTE — DISCHARGE PLANNING
Agency/Facility Name: [facility]   Spoke To: [facility]  Outcome: Per [facility] facility currently has no beds.

## 2020-05-12 NOTE — PROGRESS NOTES
Report received from night shift RN. Pt care assumed. Assessment performed. Pt AOx4. Pt denies pain and no signs of distress. Bed in low, locked position. Pt educated on calling to ambulate. Call light within reach. Treaded socks on pt.

## 2020-05-12 NOTE — CARE PLAN
Problem: Communication  Goal: The ability to communicate needs accurately and effectively will improve  Outcome: PROGRESSING AS EXPECTED  Intervention: [place] patient and significant other/support system to call light to alert staff of needs  Flowsheets (Taken 5/11/2020 2211)  Oriented to:: All of the Following : Location of Bathroom, Visiting Policy, Unit Routine, Call Light and Bedside Controls, Bedside Rail Policy, Smoking Policy, Rights and Responsibilities, Bedside Report, and Patient Education Notebook  Note: Pt updated on plan of care. Patient expresses understanding. All questions answered. Call light within reach and educated on how to use it.      Problem: Infection  Goal: Will remain free from infection  Outcome: PROGRESSING AS EXPECTED  Note: Pt verbalized understanding of the importance of hand hygiene. Pt assessed for signs and symptoms of infection.

## 2020-05-12 NOTE — DISCHARGE PLANNING
Received Choice form at 1115  Agency/Facility Name: [facility]  Referral sent per Choice form @ 1118     Received Choice form at 1116  Agency/Facility Name: [facility]  Referral sent per Choice form @ 1115

## 2020-05-12 NOTE — H&P
REHABILITATION HISTORY AND PHYSICAL/POST ADMISSION PHYSICAL EVALUATION    Date of Admission: 5/12/2020  Sena Boyce  RH02/02    [facility] Code / Diagnosis to Support: 09 Cardiac  Etiologic Diagnosis: ACS (acute coronary syndrome) (HCC) s/p stenting x3    CC: Decreased mobility, fatigue    HPI:  Adapted from Dr. Osborn's PM&R consult:  The patient is a 79 y.o. 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 Patient initially presented to [facility] where she had uptrending troponins so she was transferred to [facility], [place]. Patient underwent left heart catheterization with Dr. Olivas on 5/8/20 with FELIPE x3 in mid left anterior descending, proximal left anterior descending, and left posterolateral coronary arteries.  TTE with EF of 65 with mild aortic stenosis.  Patient has been maintained on ASA and ticagrelor as well as statin and beta blocker.  Her hospital stay was complicated by LE weakness and decreased mobility.    Patient tolerated transfer to [facility] on 5/12/20. She reports she is very fatigued and just wants to sleep. She reports history of 2 weeks of worsening swelling and LE weakness prior to her ACS. She reports she has a history of weakness from polio as a child.  She reports some numbness in her feet for same time period of 2 weeks. Denies burning. Denies bowel or bladder changes. Denies NVD.     REVIEW OF SYSTEMS:     Comprehensive 14 point ROS was reviewed and all were negative except as noted elsewhere in this document.     PMH:  Past Medical History:   Diagnosis Date   • Gout    • Heart attack (HCC)     [id number]   • High blood pressure    • History of cholecystectomy    • Hypercholesteremia    • Hypothyroidism    • Polio     as a kid       PSH:  Past Surgical History:   Procedure Laterality Date   • HYSTERECTOMY ROBOTIC  5/1/2015     Performed by Chuck Palmer M.D. at SURGERY [facility]   • OOPHORECTOMY  5/1/2015    Performed by Chuck Palmer M.D. at SURGERY [facility]       FAMILY HISTORY:  History reviewed. No pertinent family history.   Father - heart disease      MEDICATIONS:  Current Facility-Administered Medications   Medication Dose   • Respiratory Therapy Consult     • Pharmacy Consult Request ...Pain Management Review 1 Each  1 Each   • oxyCODONE immediate-release (ROXICODONE) tablet 5 mg  5 mg   • oxyCODONE immediate release (ROXICODONE) tablet 10 mg  10 mg   • tramadol (ULTRAM) 50 MG tablet 50 mg  50 mg   • hydrALAZINE (APRESOLINE) tablet 25 mg  25 mg   • acetaminophen (TYLENOL) tablet 650 mg  650 mg   • senna-docusate (PERICOLACE or SENOKOT S) 8.6-50 MG per tablet 2 Tab  2 Tab    And   • polyethylene glycol/lytes (MIRALAX) PACKET 1 Packet  1 Packet    And   • magnesium hydroxide (MILK OF MAGNESIA) suspension 30 mL  30 mL    And   • bisacodyl (DULCOLAX) suppository 10 mg  10 mg   • artificial tears ophthalmic solution 1 Drop  1 Drop   • benzocaine-menthol (CEPACOL) lozenge 1 Lozenge  1 Lozenge   • mag hydrox-al hydrox-simeth (MAALOX PLUS ES or MYLANTA DS) suspension 20 mL  20 mL   • ondansetron (ZOFRAN ODT) dispertab 4 mg  4 mg    Or   • ondansetron (ZOFRAN) syringe/vial injection 4 mg  4 mg   • traZODone (DESYREL) tablet 50 mg  50 mg   • sodium chloride (OCEAN) 0.65 % nasal spray 2 Spray  2 Spray   • [START ON 5/13/2020] enoxaparin (LOVENOX) inj 40 mg  40 mg   • ticagrelor (BRILINTA) tablet 90 mg  90 mg   • [START ON 5/13/2020] aspirin EC (ECOTRIN) tablet 81 mg  81 mg   • atorvastatin (LIPITOR) tablet 80 mg  80 mg   • carvedilol (COREG) tablet 12.5 mg  12.5 mg   • [START ON 5/13/2020] levothyroxine (SYNTHROID) tablet 75 mcg  75 mcg   • [START ON 5/13/2020] lisinopril (PRINIVIL) tablet 15 mg  15 mg   • mirtazapine (REMERON) orally disintegrating tab 7.5 mg  7.5 mg       ALLERGIES:  Allopurinol    PSYCHOSOCIAL HISTORY:  Housing  / Facility: 2 Story House  Steps Into Home: 2  Steps In Home: 16  Lives with - Patient's Self Care Capacity: Adult Children  Equipment Owned: Front-Wheel Walker     Prior Level of Function / Living Situation:   Physical Therapy: Prior Services: Intermittent Physical Support for ADL Per Family  Housing / Facility: 2 Story House  Steps Into Home: 2  Steps In Home: 16  Equipment Owned: Front-Wheel Walker  Lives with - Patient's Self Care Capacity: Adult Children  Bed Mobility: Independent  Transfer Status: Independent  Ambulation: Independent  Distance Ambulation (Feet): 30  Assistive Devices Used: Front-Wheel Walker  Current Level of Function:   Level Of Assist: Maximal Assist  Assistive Device: Front Wheel Walker  Distance (Feet): 5  Deviation: Other (Comment)(forward flexed, knee flexed)  # of Stairs Climbed: 0  Weight Bearing Status: fwb  Supine to Sit: Minimal Assist(therapist hands and HOB elevated)  Sit to Supine: (left in chair)  Scooting: Minimal Assist(use of therapist hands)  Sit to Stand: Minimal Assist  Bed, Chair, Wheelchair Transfer: Minimal Assist  Toilet Transfers: Minimal Assist(BSC only)  Transfer Method: Other (Comments)(stand step with v/cs for foot placement during turn)  Sitting in Chair: 30+ min left in chair and on BSC  Sitting Edge of Bed: 8 min  Standing: 3 min total  Occupational Therapy:   Self Feeding: Independent  Grooming / Hygiene: Independent  Bathing: Independent  Dressing: Independent  Toileting: Independent  Medication Management: Independent  Laundry: Independent  Kitchen Mobility: Independent  Finances: Independent  Home Management: Independent  Shopping: Independent  Prior Level Of Mobility: Independent With Device in Community, Independent With Device in Home  Driving / Transportation: Relatives / Others Provide Transportation  Prior Services: Intermittent Physical Support for ADL Per Family  Housing / Facility: [facility]  Current Level of Function:   Upper Body Dressing:  "Minimal Assist  Lower Body Dressing: Maximal Assist(socks; reports wears only slippers at baseline)  Toileting: Minimal Assist(for balance during pericare)    CURRENT LEVEL OF FUNCTION:   Same as level of function prior to admission to [facility]    PHYSICAL EXAM:     VITAL SIGNS:   height is 1.6 m (5' 3\") and weight is 90.7 kg (200 lb). Her temporal temperature is 37.4 °C (99.4 °F). Her blood pressure is 154/79 and her pulse is 64. Her respiration is 18 and oxygen saturation is 95%.   GENERAL: No apparent distress  HEENT: Normocephalic/atraumatic and EOMI  CARDIAC: Regular rate and rhythm, normal S1, S2   LUNGS: Clear to auscultation   ABDOMINAL: bowel sounds present, soft and nontender    EXTREMITIES: Numerous lesions in various states of healing to BLE, 1+ edema both feet  NEURO  Mental status:  A&Ox4 (person, place, date, situation) answers questions appropriately  Speech: fluent, no aphasia or dysarthria  CRANIAL NERVES: CN 2-12 intact  Motor:  4/5 BUE  Hip flexors:  Right -  3/5, Left -  3/5  Knee ext:  Right -  3/5, Left -  3/5  Dorsiflexors:  Right -  2/5, Left -  3/5  EHL:  Right -  2/5, Left -  2/5  Plantar flexors:  Right -  3/5, Left -  3/5  Sensory: intact to light touch through out  DTRs: 2+ in bilateral biceps and patellar tendons    RADIOLOGY:                                                TTE 5/8/20  CONCLUSIONS  No prior study is available for comparison.   Mild concentric left ventricular hypertrophy.  Left ventricular ejection fraction is visually estimated to be 65%.  Grade II diastolic dysfunction.  Mild aortic stenosis.  Vmax is 2.8  m/s.  Normal inferior vena cava size and inspiratory collapse      LABS:  Recent Labs     05/12/20  0300   SODIUM 139   POTASSIUM 3.8   CHLORIDE 105   CO2 20   GLUCOSE 114*   BUN 23*   CREATININE 0.86   CALCIUM 8.5     Recent Labs     05/12/20  0300   WBC 8.6   RBC 3.92*   HEMOGLOBIN 12.2   HEMATOCRIT 36.8*   MCV 93.9   MCH 31.1   MCHC 33.2* "   RDW 50.4*   PLATELETCT 231   MPV 10.9             PRIMARY REHAB DIAGNOSIS:    This patient is a 79 y.o. female admitted for acute inpatient rehabilitation with ACS (acute coronary syndrome) (HCC).    IMPAIRMENTS:   ADLs/IADLs  Mobility    SECONDARY DIAGNOSIS/MEDICAL CO-MORBIDITIES AFFECTING FUNCTION:  HTN  HLD  Hypothyroidism  Elevated Temp    RELEVANT CHANGES SINCE PREADMISSION EVALUATION:    Status unchanged    The patient's rehabilitation potential is Very Good  The patient's medical prognosis is good    PLAN:   Discussion and Recommendations:   1. The patient requires an acute inpatient rehabilitation program with a coordinated program of care at an intensity and frequency not available at a lower level of care. This recommendation is substantiated by the patient's medical physicians who recommend that the patient's intervention and assessment of medical issues needs to be done at an acute level of care for patient's safety and maximum outcome.   2. A coordinated program of care will be supplied by an interdisciplinary team of physical therapy, occupational therapy, rehab physician, rehab nursing, and, if needed, speech therapy and rehab psychology. Rehab team presents a patient-specific rehabilitation and education program concentrating on prevention of future problems related to accessibility, mobility, skin, bowel, bladder, sexuality, and psychosocial and medical/surgical problems.   3. Need for Rehabilitation Physician: The rehab physician will be evaluating the patient on a multi-weekly basis to help coordinate the program of care. The rehab physician communicates between medical physicians, therapists, and nurses to maximize the patient's potential outcome. Specific areas in which the rehab physician will be providing daily assessment include the following:   A. Assessing the patient's heart rate and blood pressure response (vitals monitoring) to activity and making adjustments in medications or  conservative measures as needed.   B. The rehab physician will be assessing the frequency at which the program can be increased to allow the patient to reach optimal functional outcome.   C. The rehab physician will also provide assessments in daily skin care, especially in light of patient's impairments in mobility.   D. The rehab physician will provide special expertise in understanding how to work with functional impairment and recommend appropriate interventions, compensatory techniques, and education that will facilitate the patient's outcome.   4. Rehab R.N.   The rehab RN will be working with patient to carry over in room mobility and activities of daily living when the patient is not in 3 hours of skilled therapy. Rehab nursing will be working in conjunction with rehab physician to address all the medical issues above and continue to assess laboratory work and discuss abnormalities with the treating physicians, assess vitals, and response to activity, and discuss and report abnormalities with the rehab physician. Rehab RN will also continue daily skin care, supervise bladder/bowel program, instruct in medication administration, and ensure patient safety.   5. Rehab Therapy: Therapies to treat at intensity and frequency of (may change after completion of evaluation by all therapeutic disciplines):       PT:  Physical therapy to address mobility, transfer, gait training and evaluation for adaptive equipment needs 1 and 1/2  hour/day at least 5 days/week for the duration of the ELOS (see below)       OT:  Occupational therapy to address ADLs, self-care, home management training, functional mobility/transfers and assistive device evaluation, and community re-integration 1 and 1/2 hour/day at least 5 days/week for the duration of the ELOS (see below).     Medical management / Rehabilitation Issues/ Adverse Potential as part of rehabilitation plan     REHABILITATION ISSUES/ADVERSE POTENTIAL:  1.  ACS s/p FELIPE x3 -  presented from OSH with chest pain, s/p stenting on 5/8/20 with Dr. Olivas. Continue ASA, Brilinta.  Patient demonstrates functional deficits in strength, balance, coordination, and ADL's. Patient is admitted to [facility] for comprehensive rehabilitation therapy as described below.   Rehabilitation nursing monitors bowel and bladder control, educates on medication administration, co-morbidities and monitors patient safety.    2.  Neurostimulants: None at this time but continue to assess daily for need to initiate should status change.    3.  DVT prophylaxis:  Patient is on Lovenox for anticoagulation upon transfer. Encourage OOB. Monitor daily for signs and symptoms of DVT including but not limited to swelling and pain to prevent the development of DVT that may interfere with therapies.    4.  GI prophylaxis:  On prilosec to prevent gastritis/dyspepsia which may interfere with therapies.    5.  Pain: No issues with pain currently / Controlled with APAP/Tramadol    6.  Nutrition/Dysphagia: Dietician monitors nutrient intake, recommend supplements prn and provide nutrition education to pt/family to promote optimal nutrition for wound healing/recovery.     7.  Bladder/bowel:  Start bowel and bladder program as described below, to prevent constipation, urinary retention (which may lead to UTI), and urinary incontinence (which will impact upon pt's functional independence).   - Post void bladder scans, I&O cath for PVRs >400  - up to commode after meal     8.  Skin/dermal ulcer prophylaxis: Monitor for new skin conditions with q.2 h. turns as required to prevent the development of skin breakdown.     9.  Cognition/Behavior:  Psychologist Dr. Mayer provides adjustment counseling to illness and psychosocial barriers that may be potential barriers to rehabilitation.     10. Respiratory therapy: RT performs O2 management prn, breathing retraining, pulmonary hygiene and bronchospasm management prn  to optimize participation in therapies.     MEDICAL CO-MORBIDITIES/ADVERSE POTENTIAL AFFECTING FUNCTION:    ACS s/p FELIPE x3 - presented from OSH with chest pain, s/p stenting on 5/8/20 with Dr. Olivas. Continue ASA, Brilinta.  -PT and OT for mobility and ADLs  -Follow-up cardiology, PCP    HTN - Patient on Coreg 12.5 mg BID, Lisinopril 15 mg daily. Previously on Amlodipine and Valsartan    HLD - Patient on Atorvastatin 80 mg daily    LE edema - Continue to monitor    Mood - Patient on Remeron 7.5 mg qHS at home.     GI - Start on Omeprazole.     DVT Ppx - Patient on Lovenox on transfer.    I personally performed a complete drug regimen review and no potential clinically significant medication issues were identified.     Pt was seen today for 74 min, and entire time spent in face-to-face contact was >50% in counseling and coordination of care as detailed in A/P above.        GOALS/EXPECTED LEVEL OF FUNCTION BASED ON CURRENT MEDICAL AND FUNCTIONAL STATUS (may change based on patient's medical status and rate of impairment recovery):  Transfers:   Modified Independent  Mobility/Gait:   Modified Independent  ADL's:   Modified Independent    DISPOSITION: Discharge to pre-morbid independent living setting with the supportive care of patient's family.    ELOS: 7-14 days

## 2020-05-12 NOTE — DISCHARGE PLANNING
Hospital Care Management Discharge Planning       Action:   · This RN CM spoke to patient over the phone to obtain verbal consent for transfer to [facility].   · This RN CM delivered completed COBRA form to BS RN.        Thank you for allowing me the pleasure of participating in this patient's care coordination and discharge planning.       For further assistance please contact the RN Case Manager at [phone].

## 2020-05-12 NOTE — PROGRESS NOTES
Monitor Summary:    Rhythm: SR 60-81  Ectopy: (R) PVC, (F) PAC, (R) Trip, 1.6 sp  .20 / .08 / .40

## 2020-05-12 NOTE — PROGRESS NOTES
Report received from Patricia BUNCH. Pt care assumed. Assessment performed. Pt AOx4. Pt laying supine in bed. Pt denies pain and no signs of distress. Bed in low, locked position. Pt educated on calling to ambulate. Call light within reach. Treaded socks on pt.  Hourly rounding in place.    Med rec completed per photo sent by her daughter

## 2020-05-13 PROBLEM — E66.9 OBESITY, CLASS II, BMI 35-39.9: Status: ACTIVE | Noted: 2020-05-13

## 2020-05-13 LAB
25(OH)D3 SERPL-MCNC: 22 NG/ML (ref 30–100)
ALBUMIN SERPL BCP-MCNC: 3.2 G/DL (ref 3.2–4.9)
ALBUMIN/GLOB SERPL: 1 G/DL
ALP SERPL-CCNC: 152 U/L (ref 30–99)
ALT SERPL-CCNC: 35 U/L (ref 2–50)
ANION GAP SERPL CALC-SCNC: 13 MMOL/L (ref 7–16)
AST SERPL-CCNC: 37 U/L (ref 12–45)
BASOPHILS # BLD AUTO: 0.5 % (ref 0–1.8)
BASOPHILS # BLD: 0.04 K/UL (ref 0–0.12)
BILIRUB SERPL-MCNC: 2.5 MG/DL (ref 0.1–1.5)
BUN SERPL-MCNC: 21 MG/DL (ref 8–22)
CALCIUM SERPL-MCNC: 8.7 MG/DL (ref 8.5–10.5)
CHLORIDE SERPL-SCNC: 105 MMOL/L (ref 96–112)
CO2 SERPL-SCNC: 23 MMOL/L (ref 20–33)
CREAT SERPL-MCNC: 0.86 MG/DL (ref 0.5–1.4)
EOSINOPHIL # BLD AUTO: 0.05 K/UL (ref 0–0.51)
EOSINOPHIL NFR BLD: 0.6 % (ref 0–6.9)
ERYTHROCYTE [DISTWIDTH] IN BLOOD BY AUTOMATED COUNT: 50.4 FL (ref 35.9–50)
EST. AVERAGE GLUCOSE BLD GHB EST-MCNC: 117 MG/DL
GLOBULIN SER CALC-MCNC: 3.1 G/DL (ref 1.9–3.5)
GLUCOSE SERPL-MCNC: 102 MG/DL (ref 65–99)
HBA1C MFR BLD: 5.7 % (ref 0–5.6)
HCT VFR BLD AUTO: 35.3 % (ref 37–47)
HGB BLD-MCNC: 11.6 G/DL (ref 12–16)
IMM GRANULOCYTES # BLD AUTO: 0.05 K/UL (ref 0–0.11)
IMM GRANULOCYTES NFR BLD AUTO: 0.6 % (ref 0–0.9)
LYMPHOCYTES # BLD AUTO: 0.82 K/UL (ref 1–4.8)
LYMPHOCYTES NFR BLD: 9.6 % (ref 22–41)
MCH RBC QN AUTO: 31.2 PG (ref 27–33)
MCHC RBC AUTO-ENTMCNC: 32.9 G/DL (ref 33.6–35)
MCV RBC AUTO: 94.9 FL (ref 81.4–97.8)
MONOCYTES # BLD AUTO: 1.33 K/UL (ref 0–0.85)
MONOCYTES NFR BLD AUTO: 15.5 % (ref 0–13.4)
NEUTROPHILS # BLD AUTO: 6.27 K/UL (ref 2–7.15)
NEUTROPHILS NFR BLD: 73.2 % (ref 44–72)
NRBC # BLD AUTO: 0 K/UL
NRBC BLD-RTO: 0 /100 WBC
PLATELET # BLD AUTO: 234 K/UL (ref 164–446)
PMV BLD AUTO: 11.6 FL (ref 9–12.9)
POTASSIUM SERPL-SCNC: 3.1 MMOL/L (ref 3.6–5.5)
PROT SERPL-MCNC: 6.3 G/DL (ref 6–8.2)
RBC # BLD AUTO: 3.72 M/UL (ref 4.2–5.4)
SODIUM SERPL-SCNC: 141 MMOL/L (ref 135–145)
TSH SERPL DL<=0.005 MIU/L-ACNC: 3.79 UIU/ML (ref 0.38–5.33)
WBC # BLD AUTO: 8.6 K/UL (ref 4.8–10.8)

## 2020-05-13 PROCEDURE — A9270 NON-COVERED ITEM OR SERVICE: HCPCS | Performed by: PHYSICAL MEDICINE & REHABILITATION

## 2020-05-13 PROCEDURE — 700102 HCHG RX REV CODE 250 W/ 637 OVERRIDE(OP): Performed by: PHYSICAL MEDICINE & REHABILITATION

## 2020-05-13 PROCEDURE — 97162 PT EVAL MOD COMPLEX 30 MIN: CPT

## 2020-05-13 PROCEDURE — 84443 ASSAY THYROID STIM HORMONE: CPT

## 2020-05-13 PROCEDURE — 97530 THERAPEUTIC ACTIVITIES: CPT

## 2020-05-13 PROCEDURE — 770010 HCHG ROOM/CARE - REHAB SEMI PRIVAT*

## 2020-05-13 PROCEDURE — 700111 HCHG RX REV CODE 636 W/ 250 OVERRIDE (IP): Performed by: PHYSICAL MEDICINE & REHABILITATION

## 2020-05-13 PROCEDURE — 97166 OT EVAL MOD COMPLEX 45 MIN: CPT

## 2020-05-13 PROCEDURE — 36415 COLL VENOUS BLD VENIPUNCTURE: CPT

## 2020-05-13 PROCEDURE — 83036 HEMOGLOBIN GLYCOSYLATED A1C: CPT

## 2020-05-13 PROCEDURE — 82306 VITAMIN D 25 HYDROXY: CPT

## 2020-05-13 PROCEDURE — 99233 SBSQ HOSP IP/OBS HIGH 50: CPT | Performed by: PHYSICAL MEDICINE & REHABILITATION

## 2020-05-13 PROCEDURE — 80053 COMPREHEN METABOLIC PANEL: CPT

## 2020-05-13 PROCEDURE — 85025 COMPLETE CBC W/AUTO DIFF WBC: CPT

## 2020-05-13 PROCEDURE — 97535 SELF CARE MNGMENT TRAINING: CPT

## 2020-05-13 PROCEDURE — 97110 THERAPEUTIC EXERCISES: CPT

## 2020-05-13 RX ORDER — AMLODIPINE BESYLATE 5 MG/1
5 TABLET ORAL DAILY
Status: DISCONTINUED | OUTPATIENT
Start: 2020-05-14 | End: 2020-05-15

## 2020-05-13 RX ORDER — POTASSIUM CHLORIDE 20 MEQ/1
20 TABLET, EXTENDED RELEASE ORAL DAILY
Status: DISCONTINUED | OUTPATIENT
Start: 2020-05-14 | End: 2020-05-18

## 2020-05-13 RX ORDER — LISINOPRIL 5 MG/1
15 TABLET ORAL DAILY
Status: DISCONTINUED | OUTPATIENT
Start: 2020-05-14 | End: 2020-05-14

## 2020-05-13 RX ORDER — NYSTATIN 100000 [USP'U]/G
POWDER TOPICAL 2 TIMES DAILY
Status: DISCONTINUED | OUTPATIENT
Start: 2020-05-13 | End: 2020-05-26 | Stop reason: HOSPADM

## 2020-05-13 RX ADMIN — MIRTAZAPINE 7.5 MG: 15 TABLET, ORALLY DISINTEGRATING ORAL at 20:33

## 2020-05-13 RX ADMIN — ENOXAPARIN SODIUM 40 MG: 100 INJECTION SUBCUTANEOUS at 08:31

## 2020-05-13 RX ADMIN — OMEPRAZOLE 20 MG: 20 CAPSULE, DELAYED RELEASE ORAL at 08:31

## 2020-05-13 RX ADMIN — TICAGRELOR 90 MG: 90 TABLET ORAL at 20:33

## 2020-05-13 RX ADMIN — CARVEDILOL 12.5 MG: 12.5 TABLET, FILM COATED ORAL at 17:37

## 2020-05-13 RX ADMIN — LEVOTHYROXINE SODIUM 75 MCG: 75 TABLET ORAL at 05:20

## 2020-05-13 RX ADMIN — DOCUSATE SODIUM 50 MG AND SENNOSIDES 8.6 MG 2 TABLET: 8.6; 5 TABLET, FILM COATED ORAL at 20:33

## 2020-05-13 RX ADMIN — ATORVASTATIN CALCIUM 80 MG: 40 TABLET, FILM COATED ORAL at 20:33

## 2020-05-13 RX ADMIN — ASPIRIN 81 MG: 81 TABLET, COATED ORAL at 08:31

## 2020-05-13 RX ADMIN — CARVEDILOL 12.5 MG: 12.5 TABLET, FILM COATED ORAL at 08:31

## 2020-05-13 RX ADMIN — TICAGRELOR 90 MG: 90 TABLET ORAL at 08:31

## 2020-05-13 RX ADMIN — NYSTATIN: 100000 POWDER TOPICAL at 20:33

## 2020-05-13 RX ADMIN — DOCUSATE SODIUM 50 MG AND SENNOSIDES 8.6 MG 2 TABLET: 8.6; 5 TABLET, FILM COATED ORAL at 08:34

## 2020-05-13 RX ADMIN — LISINOPRIL 15 MG: 5 TABLET ORAL at 05:20

## 2020-05-13 ASSESSMENT — ACTIVITIES OF DAILY LIVING (ADL)
TUB_SHOWER_TRANSFER_DESCRIPTION: GRAB BAR;SHOWER BENCH;INCREASED TIME;INITIAL PREPARATION FOR TASK
TOILET_TRANSFER_DESCRIPTION: INCREASED TIME;GRAB BAR;VERBAL CUEING
BED_CHAIR_WHEELCHAIR_TRANSFER_DESCRIPTION: ADAPTIVE EQUIPMENT;INCREASED TIME;SET-UP OF EQUIPMENT;ASSIST WITH TWO LIMBS
TOILETING: INDEPENDENT
TOILETING_LEVEL_OF_ASSIST_DESCRIPTION: ADAPTIVE EQUIPMENT;GRAB BAR;INCREASED TIME

## 2020-05-13 ASSESSMENT — BRIEF INTERVIEW FOR MENTAL STATUS (BIMS)
WHAT YEAR IS IT: CORRECT
INITIAL REPETITION OF BED BLUE SOCK - FIRST ATTEMPT: 3
ASKED TO RECALL SOCK: YES, NO CUE REQUIRED
ASKED TO RECALL BED: NO, COULD NOT RECALL
WHAT DAY OF THE WEEK IS IT: INCORRECT
WHAT MONTH IS IT: ACCURATE WITHIN 5 DAYS
ASKED TO RECALL BLUE: YES, AFTER CUEING (A COLOR")"
BIMS SUMMARY SCORE: 11

## 2020-05-13 ASSESSMENT — GAIT ASSESSMENTS: GAIT LEVEL OF ASSIST: UNABLE TO PARTICIPATE

## 2020-05-13 ASSESSMENT — PATIENT HEALTH QUESTIONNAIRE - PHQ9
2. FEELING DOWN, DEPRESSED, IRRITABLE, OR HOPELESS: NOT AT ALL
SUM OF ALL RESPONSES TO PHQ9 QUESTIONS 1 AND 2: 0
1. LITTLE INTEREST OR PLEASURE IN DOING THINGS: NOT AT ALL

## 2020-05-13 NOTE — CARE PLAN
Problem: Safety  Goal: Will remain free from injury  Note: Patient uses call light consistently and appropriately this shift.  Waits for assistance when needed and does not attempt self transfer.  Able to verbalize needs.  Will continue to monitor.      Problem: Venous Thromboembolism (VTW)/Deep Vein Thrombosis (DVT) Prevention:  Goal: Patient will participate in Venous Thrombosis (VTE)/Deep Vein Thrombosis (DVT)Prevention Measures  Note: Pt on lovenox daily for DVT prophylaxis     Problem: Pain Management  Goal: Pain level will decrease to patient's comfort goal  Note: Patient able to verbalize needs.  Denies pain or discomfort this shift and no s/s same noted.  Will continue to monitor.

## 2020-05-13 NOTE — CARE PLAN
Problem: Balance  Goal: STG-Within one week, patient will maintain static standing  Description: 1) Individualized goal:  3 min in // bars with min A   2) Interventions:  PT Group Therapy, PT E Stim Attended, PT Gait Training, PT Therapeutic Exercises, PT Neuro Re-Ed/Balance, PT Therapeutic Activity, and PT Manual Therapy    Outcome: NOT MET  Note: Eval completed 5/13     Problem: Mobility  Goal: STG-Within one week, patient will propel wheelchair community  Description: 1) Individualized goal:  50 ft using UEs and/or LEs, SBA   2) Interventions: PT Group Therapy, PT E Stim Attended, PT Gait Training, PT Therapeutic Exercises, PT Neuro Re-Ed/Balance, PT Therapeutic Activity, and PT Manual Therapy        Outcome: NOT MET  Note: Eval completed 5/13  Goal: STG-Within one week, patient will ambulate household distance  Description: 1) Individualized goal:  10 ft in // bars with mod A   2) Interventions: PT Group Therapy, PT E Stim Attended, PT Gait Training, PT Therapeutic Exercises, PT Neuro Re-Ed/Balance, PT Therapeutic Activity, and PT Manual Therapy        Outcome: NOT MET  Note: Eval completed 5/13     Problem: Mobility Transfers  Goal: STG-Within one week, patient will transfer bed to chair  Description: 1) Individualized goal:  mod A SPT or squat pivot with AD as needed   2) Interventions: PT Group Therapy, PT E Stim Attended, PT Gait Training, PT Therapeutic Exercises, PT Neuro Re-Ed/Balance, PT Therapeutic Activity, and PT Manual Therapy        Outcome: NOT MET  Note: Eval completed 5/13

## 2020-05-13 NOTE — DISCHARGE PLANNING
"CASE MANAGEMENT INITIAL ASSESSMENT    Admit Date:  5/12/2020     Per Dr. Bouchers H&P, \"patient is an 79 year old female transferred from [facility] S/P acute coronary syndrome (HCC), S/P stenting x3 (Dr. Olivas 5/8/2020), TTE w/ ejection fraction of 65 w/ mild aortic stenosis,  decreased mobility and fatigue\".    Per Dr. Liu H&P, \"patient has history of MI, acute blood loss anemia secondary to gynecological malignancy, long history of post-polio affecting LLE, HTN and HLD\".      I spoke with patients daughter/POA, Gali to discuss role of case management / discharge planning / team conference. Gali and her  are currently living with patient in [facility] and will stay with her as long as needed.  Patient also has a long-time friend who will also be available to stay with patient PRN.  Gali reported that she feels patient would benefit from a hospital bed at [place] an a \"small WC\".  Per Gali, patient is a retired [employer] and .      CM available as needs arise and will continue to follow for DC needs.        Diagnosis: 09 Cardiac  ACS (acute coronary syndrome) ([facility])  CAD (coronary artery disease)    Co-morbidities:   Patient Active Problem List    Diagnosis Date Noted   • Obesity, Class II, BMI 35-39.9 05/13/2020   • Hypercholesteremia    • History of coronary angioplasty with insertion of stent    • ACS (acute coronary syndrome) (HCC) 05/07/2020   • Acquired hypothyroidism 05/07/2020   • Essential hypertension 05/07/2020   • Postmenopausal vaginal bleeding 05/02/2015     Prior Living Situation:  Housing / Facility: 1 Story House(Per daughter)  Lives with - Patient's Self Care Capacity: Adult Children(Dtr & son in law living w/ pt. for now then friend avail.)    Prior Level of Function:  Medication Management: Independent  Finances: Independent  Home Management: Independent  Shopping: Independent  Prior Level Of Mobility: Independent With Device in Community  Driving / " Transportation: Driving Independent    Support Systems:  Primary : Gali  Advance Directives: Yes  Power of  (Name & Phone): Gali Burt-Daughter: [phone].    Previous Services Utilized:   Equipment Owned: 4-Wheel Walker  Prior Services: None(Has had [facility] in the past)    Other Information:  Occupation (Pre-Hospital Vocational): Retired Due To Age     Primary Payor Source: Medicare A, Medicare B  Secondary Payor Source: Other (Comments)([place] [employer] [place])  Primary Care Practitioner : Sandie Flores  Other MDs: Dr. Boswell-Cardiologist    Patient / Family Goal:  Patient / Family Goal: Gain strength, gain mobility, gain independence.    Additional Case Management Questions:  Have you ever received case management services for yourself or a family member? Yes    Do you feel you have and an understanding of what services  provide? Yes    Do you have any additional questions regarding case management? No          CASE MANAGEMENT PLAN OF CARE   Individualized Goals:   1. Gain strength  2. Gain mobility  3. Gain independence    Barriers:   1. Functional limitations  2. Stairs  3. Co-morbidities    Plan:  1. Continue to follow patient through hospitalization and provide discharge planning in collaboration with patient, family, physicians and ancillary services.     2. Utilize community resources to ensure a safe discharge.

## 2020-05-13 NOTE — CARE PLAN
Problem: Safety  Goal: Will remain free from falls  Intervention: Implement fall precautions  Note: Use of call light encouraged to make needs known.Fall precautions and frequent rounding in place for safety.Call light within reach.Will continue to monitor and assess needs and safety.     Problem: Bowel/Gastric:  Goal: Normal bowel function is maintained or improved  Intervention: Educate patient and significant other/support system about signs and symptoms of constipation and interventions to implement  Note: Pt is continent of bowel.Scheduled senna given at hs.LBM 05/12.Will continue to monitor.

## 2020-05-13 NOTE — PROGRESS NOTES
"Rehab Progress Note     Encounter Date: 5/13/2020    CC: decreased mobility, fatigue    Interval Events (Subjective)  Patient sitting up in room. She reports she slept well overnight. She has questions about admission labs. Reviewed the labs including mild anemia which is improving, low K and low Vitamin D. She reports she has had to be on K supplement in the past. She reports she is looking forward to getting home. Her daughter is currently staying with her as the school year has been cancelled and she is teaching via Zoom from patient's home. She reports if the school year is cancelled for the fall then the daughter will most likely stay through the winter in [facility] with her. Mild elevated SBP on admission. Denies headache. Denies NVD.     Objective:  VITAL SIGNS: /68   Pulse 65   Temp 36.6 °C (97.8 °F) (Oral)   Resp 20   Ht 1.6 m (5' 3\")   Wt 90.7 kg (200 lb)   SpO2 94%   BMI 35.43 kg/m²   Gen: NAD  Psych: Mood and affect appropriate  CV: RRR, no edema  Resp: CTAB, no upper airway sounds  Abd: NTND  Neuro: AOx4, pushing wheelchair BUE  Skin: multiple healing lesions BLE, right abrasion on medial right ankle    Recent Results (from the past 72 hour(s))   BLOOD CULTURE    Collection Time: 05/11/20  8:25 AM    Specimen: Peripheral; Blood   Result Value Ref Range    Significant Indicator NEG     Source BLD     Site PERIPHERAL     Culture Result       No Growth  Note: Blood cultures are incubated for 5 days and  are monitored continuously.Positive blood cultures  are called to the RN and reported as soon as  they are identified.     BLOOD CULTURE    Collection Time: 05/11/20 11:30 AM    Specimen: Peripheral; Blood   Result Value Ref Range    Significant Indicator NEG     Source BLD     Site PERIPHERAL     Culture Result       No Growth  Note: Blood cultures are incubated for 5 days and  are monitored continuously.Positive blood cultures  are called to the RN and reported as soon as  they are identified.   "   CBC WITH DIFFERENTIAL    Collection Time: 05/12/20  3:00 AM   Result Value Ref Range    WBC 8.6 4.8 - 10.8 K/uL    RBC 3.92 (L) 4.20 - 5.40 M/uL    Hemoglobin 12.2 12.0 - 16.0 g/dL    Hematocrit 36.8 (L) 37.0 - 47.0 %    MCV 93.9 81.4 - 97.8 fL    MCH 31.1 27.0 - 33.0 pg    MCHC 33.2 (L) 33.6 - 35.0 g/dL    RDW 50.4 (H) 35.9 - 50.0 fL    Platelet Count 231 164 - 446 K/uL    MPV 10.9 9.0 - 12.9 fL    Neutrophils-Polys 70.60 44.00 - 72.00 %    Lymphocytes 11.60 (L) 22.00 - 41.00 %    Monocytes 15.10 (H) 0.00 - 13.40 %    Eosinophils 1.60 0.00 - 6.90 %    Basophils 0.60 0.00 - 1.80 %    Immature Granulocytes 0.50 0.00 - 0.90 %    Nucleated RBC 0.00 /100 WBC    Neutrophils (Absolute) 6.10 2.00 - 7.15 K/uL    Lymphs (Absolute) 1.00 1.00 - 4.80 K/uL    Monos (Absolute) 1.30 (H) 0.00 - 0.85 K/uL    Eos (Absolute) 0.14 0.00 - 0.51 K/uL    Baso (Absolute) 0.05 0.00 - 0.12 K/uL    Immature Granulocytes (abs) 0.04 0.00 - 0.11 K/uL    NRBC (Absolute) 0.00 K/uL   Comp Metabolic Panel    Collection Time: 05/12/20  3:00 AM   Result Value Ref Range    Sodium 139 135 - 145 mmol/L    Potassium 3.8 3.6 - 5.5 mmol/L    Chloride 105 96 - 112 mmol/L    Co2 20 20 - 33 mmol/L    Anion Gap 14.0 7.0 - 16.0    Glucose 114 (H) 65 - 99 mg/dL    Bun 23 (H) 8 - 22 mg/dL    Creatinine 0.86 0.50 - 1.40 mg/dL    Calcium 8.5 8.5 - 10.5 mg/dL    AST(SGOT) 44 12 - 45 U/L    ALT(SGPT) 44 2 - 50 U/L    Alkaline Phosphatase 138 (H) 30 - 99 U/L    Total Bilirubin 2.6 (H) 0.1 - 1.5 mg/dL    Albumin 3.2 3.2 - 4.9 g/dL    Total Protein 6.5 6.0 - 8.2 g/dL    Globulin 3.3 1.9 - 3.5 g/dL    A-G Ratio 1.0 g/dL   ESTIMATED GFR    Collection Time: 05/12/20  3:00 AM   Result Value Ref Range    GFR If African American >60 >60 mL/min/1.73 m 2    GFR If Non African American >60 >60 mL/min/1.73 m 2   COVID/SARS CoV-2    Collection Time: 05/12/20 10:14 AM    Specimen: Nasopharyngeal; Respirate   Result Value Ref Range    COVID Order Status Received    Influenza A/B By  PCR (Adult - Flu Only)    Collection Time: 05/12/20 10:14 AM    Specimen: Nasopharyngeal; Respirate   Result Value Ref Range    Influenza virus A RNA Negative Negative    Influenza virus B, PCR Negative Negative   SARS-CoV-2, PCR (In-House)    Collection Time: 05/12/20 10:14 AM   Result Value Ref Range    SARS-CoV-2 Source NP Swab     SARS-CoV-2 by PCR NotDetected NotDetected   CBC with Differential    Collection Time: 05/13/20  5:08 AM   Result Value Ref Range    WBC 8.6 4.8 - 10.8 K/uL    RBC 3.72 (L) 4.20 - 5.40 M/uL    Hemoglobin 11.6 (L) 12.0 - 16.0 g/dL    Hematocrit 35.3 (L) 37.0 - 47.0 %    MCV 94.9 81.4 - 97.8 fL    MCH 31.2 27.0 - 33.0 pg    MCHC 32.9 (L) 33.6 - 35.0 g/dL    RDW 50.4 (H) 35.9 - 50.0 fL    Platelet Count 234 164 - 446 K/uL    MPV 11.6 9.0 - 12.9 fL    Neutrophils-Polys 73.20 (H) 44.00 - 72.00 %    Lymphocytes 9.60 (L) 22.00 - 41.00 %    Monocytes 15.50 (H) 0.00 - 13.40 %    Eosinophils 0.60 0.00 - 6.90 %    Basophils 0.50 0.00 - 1.80 %    Immature Granulocytes 0.60 0.00 - 0.90 %    Nucleated RBC 0.00 /100 WBC    Neutrophils (Absolute) 6.27 2.00 - 7.15 K/uL    Lymphs (Absolute) 0.82 (L) 1.00 - 4.80 K/uL    Monos (Absolute) 1.33 (H) 0.00 - 0.85 K/uL    Eos (Absolute) 0.05 0.00 - 0.51 K/uL    Baso (Absolute) 0.04 0.00 - 0.12 K/uL    Immature Granulocytes (abs) 0.05 0.00 - 0.11 K/uL    NRBC (Absolute) 0.00 K/uL   Comp Metabolic Panel (CMP)    Collection Time: 05/13/20  5:08 AM   Result Value Ref Range    Sodium 141 135 - 145 mmol/L    Potassium 3.1 (L) 3.6 - 5.5 mmol/L    Chloride 105 96 - 112 mmol/L    Co2 23 20 - 33 mmol/L    Anion Gap 13.0 7.0 - 16.0    Glucose 102 (H) 65 - 99 mg/dL    Bun 21 8 - 22 mg/dL    Creatinine 0.86 0.50 - 1.40 mg/dL    Calcium 8.7 8.5 - 10.5 mg/dL    AST(SGOT) 37 12 - 45 U/L    ALT(SGPT) 35 2 - 50 U/L    Alkaline Phosphatase 152 (H) 30 - 99 U/L    Total Bilirubin 2.5 (H) 0.1 - 1.5 mg/dL    Albumin 3.2 3.2 - 4.9 g/dL    Total Protein 6.3 6.0 - 8.2 g/dL    Globulin  3.1 1.9 - 3.5 g/dL    A-G Ratio 1.0 g/dL   TSH with Reflex to FT4    Collection Time: 05/13/20  5:08 AM   Result Value Ref Range    TSH 3.790 0.380 - 5.330 uIU/mL   Vitamin D, 25-hydroxy (blood)    Collection Time: 05/13/20  5:08 AM   Result Value Ref Range    25-Hydroxy   Vitamin D 25 22 (L) 30 - 100 ng/mL   ESTIMATED GFR    Collection Time: 05/13/20  5:08 AM   Result Value Ref Range    GFR If African American >60 >60 mL/min/1.73 m 2    GFR If Non African American >60 >60 mL/min/1.73 m 2       Current Facility-Administered Medications   Medication Frequency   • Respiratory Therapy Consult Continuous RT   • Pharmacy Consult Request ...Pain Management Review 1 Each PHARMACY TO DOSE   • oxyCODONE immediate-release (ROXICODONE) tablet 5 mg Q3HRS PRN   • oxyCODONE immediate release (ROXICODONE) tablet 10 mg Q3HRS PRN   • tramadol (ULTRAM) 50 MG tablet 50 mg Q4HRS PRN   • hydrALAZINE (APRESOLINE) tablet 25 mg Q8HRS PRN   • acetaminophen (TYLENOL) tablet 650 mg Q4HRS PRN   • senna-docusate (PERICOLACE or SENOKOT S) 8.6-50 MG per tablet 2 Tab BID    And   • polyethylene glycol/lytes (MIRALAX) PACKET 1 Packet QDAY PRN    And   • magnesium hydroxide (MILK OF MAGNESIA) suspension 30 mL QDAY PRN    And   • bisacodyl (DULCOLAX) suppository 10 mg QDAY PRN   • artificial tears ophthalmic solution 1 Drop PRN   • benzocaine-menthol (CEPACOL) lozenge 1 Lozenge Q2HRS PRN   • mag hydrox-al hydrox-simeth (MAALOX PLUS ES or MYLANTA DS) suspension 20 mL Q2HRS PRN   • ondansetron (ZOFRAN ODT) dispertab 4 mg 4X/DAY PRN    Or   • ondansetron (ZOFRAN) syringe/vial injection 4 mg 4X/DAY PRN   • traZODone (DESYREL) tablet 50 mg QHS PRN   • sodium chloride (OCEAN) 0.65 % nasal spray 2 Spray PRN   • enoxaparin (LOVENOX) inj 40 mg DAILY   • ticagrelor (BRILINTA) tablet 90 mg BID   • aspirin EC (ECOTRIN) tablet 81 mg DAILY   • atorvastatin (LIPITOR) tablet 80 mg Nightly   • carvedilol (COREG) tablet 12.5 mg BID WITH MEALS   • levothyroxine  (SYNTHROID) tablet 75 mcg AM ES   • lisinopril (PRINIVIL) tablet 15 mg Q DAY   • mirtazapine (REMERON) orally disintegrating tab 7.5 mg QHS   • omeprazole (PRILOSEC) capsule 20 mg DAILY       Orders Placed This Encounter   Procedures   • Diet Order Cardiac     Standing Status:   Standing     Number of Occurrences:   1     Order Specific Question:   Diet:     Answer:   Cardiac [6]       Assessment:  Active Hospital Problems    Diagnosis   • *ACS (acute coronary syndrome) (HCC)   • Hypercholesteremia   • History of coronary angioplasty with insertion of stent   • Acquired hypothyroidism   • Essential hypertension       Medical Decision Making and Plan:  ACS s/p FELIPE x3 - presented from OSH with chest pain, s/p stenting on 5/8/20 with Dr. Olivas. Continue ASA, Brilinta.  -PT and OT for mobility and ADLs  -Follow-up cardiology, PCP     HTN - Patient on Coreg 12.5 mg BID, Lisinopril 15 mg daily. Previously on Amlodipine and Valsartan  -Elevated on Admission, restart Amlodipine 5 mg     HLD - Patient on Atorvastatin 80 mg daily     LE edema - Continue to monitor     Anemia - mild on admission labs at 11.6, continue to monitor    Hypokalemia - 3.1 on admission. Start K supplement, recheck in 3 days    Mood - Patient on Remeron 7.5 mg qHS at home.     Vitamin D deficiency - 22 on admission start 1000 U    Obesity, class II - BMI of 35.43 on admission. Dietitian to consult    GI - Start on Omeprazole.      DVT Ppx - Patient on Lovenox on transfer.    Total time:  36 minutes.  I spent greater than 50% of the time for patient care and coordination on this date, including unit/floor time, and face-to-face time with the patient as per assessment and plan above. Discussion included admission labs, start supplements, elevated SBP and restart home amlodipine.     Ever Longo M.D.

## 2020-05-13 NOTE — THERAPY
"Occupational Therapy  Daily Treatment     Patient Name: Sena Boyce  Age:  79 y.o., Sex:  female  Medical Record #: [id number]  Today's Date: 5/13/2020     Precautions  Precautions: Fall Risk    Safety   ADL Safety : Requires Physical Assist for Safety, Requires Supervision for Safety    Subjective    \"Well, sometimes my memory is good and sometimes it isn't.\"    \"I had this horrible situation at the other hospital where people were yelling and screaming in the hallway and then they came in to my room. Then they tried to make me take Xanax to hush me up and knock me out so I didn't know about it. They kept peaking in my room and I told them to leave me alone. My son told me I had a bad dream, but I didn't. It really happened. I told them that I needed to come here or I was going home because I couldn't stay there any longer!\"     Objective       05/13/20 1301   Cognitive Pattern Assessment   Cognitive Pattern Assessment Used BIMS   Brief Interview for Mental Status (BIMS)   Repetition of Three Words (First Attempt) 3   Temporal Orientation: Year Correct   Temporal Orientation: Month Accurate within 5 days   Temporal Orientation: Day Incorrect   Recall: \"Sock\" Yes, no cue required   Recall: \"Blue\" Yes, after cueing (\"a color\")   Recall: \"Bed\" No, could not recall   BIMS Summary Score 11   Toileting Hygiene   Assistance Needed Physical assistance   Physical Assistance Level 75% or more   CARE Score 2   Toilet Transfer   Assistance Needed Physical assistance;Adaptive equipment   Physical Assistance Level 25%-49%   CARE Score 3   Functional Level of Assist   Toileting Maximal Assist   Toileting Description Adaptive equipment;Grab bar;Increased time   Toilet Transfers Minimal Assist   Toilet Transfer Description Increased time;Grab bar;Verbal cueing   Problem List   Problem List Decreased Active Daily Living Skills;Decreased Homemaking Skills;Decreased Upper Extremity Strength Right;Decreased Upper Extremity Strength " Left;Decreased Functional Mobility;Decreased Activity Tolerance;Safety Awareness Deficits / Cognition;Impaired Postural Control / Balance   Interdisciplinary Plan of Care Collaboration   IDT Collaboration with  Physical Therapist   Patient Position at End of Therapy Seated;Self Releasing Lap Belt Applied;Call Light within Reach;Tray Table within Reach;Phone within Reach   Collaboration Comments re: CLOF   OT Total Time Spent   OT Individual Total Time Spent (Mins) 30   OT Charge Group   OT Self Care / ADL 1   OT Therapy Activity 1       Assessment     Session focused on cognition assessment and toileting. Pt had loose stools. Pt required significant assist for toileting. Pt had mild difficulty with memory section of assessment. Pt recalled a situation from the other hospital in which she was terrified about a situation in the hallway. Pt states that her son tells her it was a bad dream, but she is convinced it was real. Unable to determine if story is accurate or if pt was experiencing a nightmare/delirium. Pt has been highly anxious during sessions. OTR will continue to monitor cognition and mood.    Plan    ADLs, iADLs, assess functional cognition, relaxation techniques/coping skills, endurance, standing balance/tolerance, strengthening    Occupational Therapy Goals     Problem: Dressing     Dates: Start: 05/13/20       Goal: STG-Within one week, patient will dress UB     Dates: Start: 05/13/20       Description: 1) Individualized Goal:  with supervision  2) Interventions:  OT Group Therapy, OT Self Care/ADL, OT Community Reintegration, OT Manual Ther Technique, OT Neuro Re-Ed/Balance, OT Sensory Int Techniques, OT Therapeutic Activity, OT Evaluation, and OT Therapeutic Exercise            Goal: STG-Within one week, patient will dress LB     Dates: Start: 05/13/20       Description: 1) Individualized Goal:  with moderate assistance and AE  2) Interventions:  OT Group Therapy, OT Self Care/ADL, OT Community  Reintegration, OT Manual Ther Technique, OT Neuro Re-Ed/Balance, OT Sensory Int Techniques, OT Therapeutic Activity, OT Evaluation, and OT Therapeutic Exercise                  Problem: Functional Transfers     Dates: Start: 05/13/20       Goal: STG-Within one week, patient will transfer to toilet     Dates: Start: 05/13/20       Description: 1) Individualized Goal:  with min A  2) Interventions:  OT Group Therapy, OT Self Care/ADL, OT Community Reintegration, OT Manual Ther Technique, OT Neuro Re-Ed/Balance, OT Sensory Int Techniques, OT Therapeutic Activity, OT Evaluation, and OT Therapeutic Exercise                  Problem: OT Long Term Goals     Dates: Start: 05/13/20       Goal: LTG-By discharge, patient will complete basic self care tasks     Dates: Start: 05/13/20       Description: 1) Individualized Goal:  with Supervision  2) Interventions:  OT Group Therapy, OT Self Care/ADL, OT Community Reintegration, OT Manual Ther Technique, OT Neuro Re-Ed/Balance, OT Sensory Int Techniques, OT Therapeutic Activity, OT Evaluation, and OT Therapeutic Exercise            Goal: LTG-By discharge, patient will perform bathroom transfers     Dates: Start: 05/13/20       Description: 1) Individualized Goal:  with supervision  2) Interventions:  OT Group Therapy, OT Self Care/ADL, OT Community Reintegration, OT Manual Ther Technique, OT Neuro Re-Ed/Balance, OT Sensory Int Techniques, OT Therapeutic Activity, OT Evaluation, and OT Therapeutic Exercise                  Problem: Toileting     Dates: Start: 05/13/20       Description: 1) Individualized Goal:  with Min A  2) Interventions:  OT Group Therapy, OT Self Care/ADL, OT Community Reintegration, OT Manual Ther Technique, OT Neuro Re-Ed/Balance, OT Sensory Int Techniques, OT Therapeutic Activity, OT Evaluation, and OT Therapeutic Exercise      Goal: STG-Within one week, patient will complete toileting tasks     Dates: Start: 05/13/20       Description: 1) Individualized  Goal:  with min A  2) Interventions:  OT Group Therapy, OT Self Care/ADL, OT Community Reintegration, OT Manual Ther Technique, OT Neuro Re-Ed/Balance, OT Sensory Int Techniques, OT Therapeutic Activity, OT Evaluation, and OT Therapeutic Exercise

## 2020-05-13 NOTE — THERAPY
Physical Therapy   Daily Treatment     Patient Name: Sena Boyce  Age:  79 y.o., Sex:  female  Medical Record #: [id number]  Today's Date: 5/13/2020     Precautions  Precautions: Fall Risk    Subjective    Pt seated in room, talking with daughter and son in law at window, agreeable to PT.      Objective       05/13/20 1431   Precautions   Precautions Fall Risk   Sitting Lower Body Exercises   Ankle Pumps 1 set of 10   Hip Abduction 1 set of 10   Hip Adduction 1 set of 10   Long Arc Quad 1 set of 10   Hamstring Curl 1 set of 10   Other Exercises Above exercises completed with pt seated in WC and pink tband for resistance    Bed Mobility    Sit to Stand Minimal Assist  (in // bars )   Interdisciplinary Plan of Care Collaboration   IDT Collaboration with  Family / Caregiver   Patient Position at End of Therapy Seated;Call Light within Reach;Tray Table within Reach   Collaboration Comments Pt's daughter and son in law at window at beginning of PT session, introduced to PT    PT Total Time Spent   PT Individual Total Time Spent (Mins) 30   PT Charge Group   PT Therapeutic Exercise 1   PT Therapeutic Activities 1       Assessment    Pt limited by pain in BLEs (specifically feet), reports increased pain and discomfort with upright posture vs stooped/ kyphotic posture. High anxiety with standing.     Plan    Continue to attempt ambulation in // bars, standing tolerance, LE strength and endurance, balance training as tolerated.     Physical Therapy Problems     Problem: Balance     Dates: Start: 05/13/20       Goal: STG-Within one week, patient will maintain static standing     Dates: Start: 05/13/20       Description: 1) Individualized goal:  3 min in // bars with min A   2) Interventions:  PT Group Therapy, PT E Stim Attended, PT Gait Training, PT Therapeutic Exercises, PT Neuro Re-Ed/Balance, PT Therapeutic Activity, and PT Manual Therapy                  Problem: Mobility     Dates: Start: 05/13/20       Description:        Goal: STG-Within one week, patient will propel wheelchair community     Dates: Start: 05/13/20       Description: 1) Individualized goal:  50 ft using UEs and/or LEs, SBA   2) Interventions: PT Group Therapy, PT E Stim Attended, PT Gait Training, PT Therapeutic Exercises, PT Neuro Re-Ed/Balance, PT Therapeutic Activity, and PT Manual Therapy                Goal: STG-Within one week, patient will ambulate household distance     Dates: Start: 05/13/20       Description: 1) Individualized goal:  10 ft in // bars with mod A   2) Interventions: PT Group Therapy, PT E Stim Attended, PT Gait Training, PT Therapeutic Exercises, PT Neuro Re-Ed/Balance, PT Therapeutic Activity, and PT Manual Therapy                      Problem: Mobility Transfers     Dates: Start: 05/13/20       Goal: STG-Within one week, patient will transfer bed to chair     Dates: Start: 05/13/20       Description: 1) Individualized goal:  mod A SPT or squat pivot with AD as needed   2) Interventions: PT Group Therapy, PT E Stim Attended, PT Gait Training, PT Therapeutic Exercises, PT Neuro Re-Ed/Balance, PT Therapeutic Activity, and PT Manual Therapy                      Problem: PT-Long Term Goals     Dates: Start: 05/13/20       Goal: LTG-By discharge, patient will ambulate     Dates: Start: 05/13/20       Description: 150 ft with LRAD, spv           Goal: LTG-By discharge, patient will transfer one surface to another     Dates: Start: 05/13/20       Description: Spv with LRAD           Goal: LTG-By discharge, patient will ambulate up/down 4-6 stairs     Dates: Start: 05/13/20       Description: 1) Individualized goal:  2 steps with single HR and CGA   2) Interventions: PT Group Therapy, PT E Stim Attended, PT Gait Training, PT Therapeutic Exercises, PT Neuro Re-Ed/Balance, PT Therapeutic Activity, and PT Manual Therapy                Goal: LTG-By discharge, patient will transfer in/out of a car     Dates: Start: 05/13/20       Description: 1)  Individualized goal:  Spv with LRAD   2) Interventions: PT Group Therapy, PT E Stim Attended, PT Gait Training, PT Therapeutic Exercises, PT Neuro Re-Ed/Balance, PT Therapeutic Activity, and PT Manual Therapy

## 2020-05-13 NOTE — REHAB-PHARMACY IDT TEAM NOTE
Pharmacy   Pharmacy  Antibiotics/Antifungals/Antivirals:  Medication:      Active Orders (From admission, onward)    None        Route:         None  Stop Date:  N/A  Reason:   Antihypertensives/Cardiac:  Medication:    Orders (72h ago, onward)     Start     Ordered    05/14/20 0900  amLODIPine (NORVASC) tablet 5 mg  DAILY      05/13/20 1142    05/14/20 0900  lisinopril (PRINIVIL) tablet 15 mg  DAILY      05/13/20 1142    05/13/20 0600  lisinopril (PRINIVIL) tablet 15 mg  Q DAY,   Status:  Discontinued      05/12/20 1511    05/12/20 2100  atorvastatin (LIPITOR) tablet 80 mg  NIGHTLY      05/12/20 1511    05/12/20 1730  carvedilol (COREG) tablet 12.5 mg  2 TIMES DAILY WITH MEALS      05/12/20 1511    05/12/20 1511  hydrALAZINE (APRESOLINE) tablet 25 mg  EVERY 8 HOURS PRN      05/12/20 1511              Patient Vitals for the past 24 hrs:   BP Pulse   05/13/20 0831 145/68 65   05/13/20 0700 158/84 65   05/13/20 0520 135/75 --   05/12/20 1830 144/75 66     Anticoagulation:  Medication:  enoxaparin  INR:      Other key medications:      A review of the medication list reveals no issues at this time. Patient is currently on antihypertensive(s). Recommend home blood pressure monitoring/recording if antihypertensive(s) regimen(s) continue.    Section completed by:  Markel [facility]

## 2020-05-13 NOTE — THERAPY
"Occupational Therapy   Initial Evaluation     Patient Name: Sena Boyce  Age:  79 y.o., Sex:  female  Medical Record #: [id number]  Today's Date: 5/13/2020     Subjective    \"I can't put weight on this leg. How will I do this? Oh no! Oh No! You must get a workout here.\" Pt stated as gripping on to therapists arm due to feeling nervous while doing bed to w/c transfer.    Pt hyperverbose and anxious. Discussing home remodels and home set up in detail throughout evaluation.     Objective       05/13/20 0701   Prior Living Situation   Housing / Facility 2 Story House   Steps Into Home 2  (but also has a ramp)   Steps In Home 16  (to basement which pt does not need to recommend)   Rail Left Rail  (Steps into Home)   Bathroom Set up Walk In Shower;Shower Chair;Grab Bars   Equipment Owned Front-Wheel Walker;Wheelchair;Tub / Shower Seat;Grab Bar(s) In Tub / Shower;Ramp   Lives with - Patient's Self Care Capacity Alone and Able to Care For Self   Comments Will have daughter with her temporarily due to WFH with covid; Pt stated a good friend of her will also temporarily move in to do household management   Prior Level of ADL Function   Self Feeding Independent   Grooming / Hygiene Independent   Bathing Independent   Dressing Independent   Toileting Independent   Prior Level of IADL Function   Medication Management Independent   Laundry Independent   Kitchen Mobility Independent   Finances Independent   Home Management Independent   Shopping Independent   Prior Level Of Mobility Independent With Device in Home   Driving / Transportation Driving Independent   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   Pain 0 - 10 Group   Location Arm;Leg   Location Orientation Right   Pain Rating Scale (NPRS) 3   Comfort Goal Comfort at Rest;Comfort with Movement;Perform Activity   Therapist Pain Assessment Prior to Activity;During Activity   Non Verbal Descriptors   Non Verbal Scale  Calm   Cognition    Level of Consciousness Alert   Vision Screen   Vision Not tested  (pt reports no changes)   Balance Assessment   Sitting Balance (Static) Fair -   Sitting Balance (Dynamic) Fair -   Standing Balance (Static) Poor -   Standing Balance (Dynamic) Trace +   Bed Mobility    Supine to Sit Stand by Assist   Scooting Stand by Assist   Eating   Assistance Needed Set-up / clean-up;Supervision   Physical Assistance Level No physical assistance   CARE Score 4   Eating Discharge Goal   Discharge Goal Independent   Oral Hygiene   Assistance Needed Set-up / clean-up;Supervision   Physical Assistance Level No physical assistance   CARE Score 4   Oral Hygiene Discharge Goal   Discharge Goal Independent   Shower/Bathe Self   Assistance Needed Physical assistance;Verbal cues;Supervision;Set-up / clean-up;Adaptive equipment   Physical Assistance Level 50%-74%   Comment assist for feet, buttocks, and back   CARE Score 2   Shower/Bathe Self Discharge Goal   Discharge Goal Supervision or touching assistance   Upper Body Dressing   Assistance Needed Physical assistance   Physical Assistance Level Less than 25%   CARE Score 3   Upper Body Dressing Discharge Goal   Discharge Goal Independent   Lower Body Dressing   Assistance Needed Physical assistance;Supervision;Set-up / clean-up   Physical Assistance Level 75% or more   Comment assist for threading BLE in pants and brief; assist pulling pants over hips; total assist for socks;    CARE Score 2   Lower Body Dressing Discharge Goal   Discharge Goal Set-up/clean-up   Putting On/Taking Off Footwear   Assistance Needed Physical  assistance   Physical Assistance Level Total assistance   Comment Pt states she was not able to don/doff at baseline. Wears slip on slippers at home.   CARE Score 1   Putting On/Taking Off Footwear Discharge Goal   Discharge Goal Supervision or touching assistance   Toileting Hygiene   Reason if not Attempted Safety concerns   CARE Score 88   Toileting Hygiene Discharge Goal   Discharge Goal Supervision or touching assistance   Toilet Transfer   Reason if not Attempted Safety concerns   CARE Score 88   Toilet Transfer Discharge Goal   Discharge Goal Supervision or touching assistance   Hearing, Speech, and Vision   Expression of Ideas and Wants Without difficulty   Understanding Verbal and Non-Verbal Content Understands   Functional Level of Assist   Eating Supervision   Eating Description Set-up of equipment or meal/tube feeding   Grooming Supervision   Bathing Maximal Assist   Bathing Description Hand held shower;Grab bar;Tub bench;Assit with back;Assit wtih lower extremities;Initial preparation for task;Set-up of equipment;Supervision for safety;Verbal cueing;Other (comment)  (assist with buttocks)   Upper Body Dressing Minimal Assist   Upper Body Dressing Description Other (comment);Increased time;Set-up of equipment;Supervision for safety  (assist for pulling shirt down in back)   Lower Body Dressing Maximal Assist   Lower Body Dressing Description Assist with threading into pant leg;Increased time;Supervision for safety;Set-up of equipment;Other (comment)  (A for threading pant and brief; A for pulling up; A for sock)   Bed, Chair, Wheelchair Transfer Moderate Assist   Bed Chair Wheelchair Transfer Description Adaptive equipment;Increased time;Initial preparation for task;Supervision for safety;Verbal cueing;Other (comment)  (SBA sup to sit; Mod for SPT)   Tub / Shower Transfers Minimal Assist   Tub Shower Transfer Description Grab bar;Shower bench;Increased time;Initial preparation for task   Comprehension  Modified Independent   Comprehension Description Verbal cues   Expression Independent   Problem Solving Supervision   Problem Solving Description Therapy schedule;Verbal cueing   Memory Supervision   Memory Description Therapy schedule   Precautions   Precautions Fall Risk   Interdisciplinary Plan of Care Collaboration   IDT Collaboration with  Physical Therapist   Patient Position at End of Therapy Seated;Self Releasing Lap Belt Applied;Call Light within Reach;Tray Table within Reach;Phone within Reach   Collaboration Comments re: CLOF   Equipment Needs   Assistive Device / DME   (Pt has required equipment.)   Adaptive Equipment Sock Aide;Dressing Stick;Reacher;Long Handled Shoe Horn;Long Handled Sponge   OT Total Time Spent   OT Individual Total Time Spent (Mins) 60   OT Charge Group   OT Self Care / ADL 1   OT Evaluation OT Evaluation Mod       Assessment  Patient is 79 y.o. female with a diagnosis of Acute Coronary Syndrome s/p stenting x3.  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 Pt will have daughter at home temporarily due to daughter being able to work from home as a teacher. Daughter otherwise [place] out of [place]. Pt states she will also have her friend living with her temporarily to help with household management. Pt very anxious and hyperverbose during evaluation. Pt with impairments to functional mobility, strength, balance, endurance, and adjustment to disability impacting safety and independence with I/ADLs.    BIMS, toileting and strength testing plan to be completed in afternoon session due to time limitations with pt anxiety.     Plan  Recommend Occupational Therapy  minutes per day 5-7 days per week for 7-14 days for the following treatments:  OT Group Therapy,  OT Self Care/ADL, OT Cognitive Skill Dev, OT Community Reintegration, OT Manual Ther Technique, OT Neuro Re-Ed/Balance, OT Sensory Int Techniques, OT Therapeutic Activity, OT Evaluation and OT Therapeutic Exercise.    Goals:  Long term and short term goals have been discussed with patient and they are in agreement.    Occupational Therapy Goals     Problem: Dressing     Dates: Start: 05/13/20       Goal: STG-Within one week, patient will dress UB     Dates: Start: 05/13/20       Description: 1) Individualized Goal:  with supervision  2) Interventions:  OT Group Therapy, OT Self Care/ADL, OT Community Reintegration, OT Manual Ther Technique, OT Neuro Re-Ed/Balance, OT Sensory Int Techniques, OT Therapeutic Activity, OT Evaluation, and OT Therapeutic Exercise            Goal: STG-Within one week, patient will dress LB     Dates: Start: 05/13/20       Description: 1) Individualized Goal:  with moderate assistance and AE  2) Interventions:  OT Group Therapy, OT Self Care/ADL, OT Community Reintegration, OT Manual Ther Technique, OT Neuro Re-Ed/Balance, OT Sensory Int Techniques, OT Therapeutic Activity, OT Evaluation, and OT Therapeutic Exercise                  Problem: Functional Transfers     Dates: Start: 05/13/20       Goal: STG-Within one week, patient will transfer to toilet     Dates: Start: 05/13/20       Description: 1) Individualized Goal:  with min A  2) Interventions:  OT Group Therapy, OT Self Care/ADL, OT Community Reintegration, OT Manual Ther Technique, OT Neuro Re-Ed/Balance, OT Sensory Int Techniques, OT Therapeutic Activity, OT Evaluation, and OT Therapeutic Exercise                  Problem: OT Long Term Goals     Dates: Start: 05/13/20       Goal: LTG-By discharge, patient will complete basic self care tasks     Dates: Start: 05/13/20       Description: 1) Individualized Goal:  with Supervision  2) Interventions:  OT Group Therapy, OT Self Care/ADL, OT Community Reintegration, OT Manual Ther  Technique, OT Neuro Re-Ed/Balance, OT Sensory Int Techniques, OT Therapeutic Activity, OT Evaluation, and OT Therapeutic Exercise            Goal: LTG-By discharge, patient will perform bathroom transfers     Dates: Start: 05/13/20       Description: 1) Individualized Goal:  with supervision  2) Interventions:  OT Group Therapy, OT Self Care/ADL, OT Community Reintegration, OT Manual Ther Technique, OT Neuro Re-Ed/Balance, OT Sensory Int Techniques, OT Therapeutic Activity, OT Evaluation, and OT Therapeutic Exercise                  Problem: Toileting     Dates: Start: 05/13/20       Description: 1) Individualized Goal:  with Min A  2) Interventions:  OT Group Therapy, OT Self Care/ADL, OT Community Reintegration, OT Manual Ther Technique, OT Neuro Re-Ed/Balance, OT Sensory Int Techniques, OT Therapeutic Activity, OT Evaluation, and OT Therapeutic Exercise      Goal: STG-Within one week, patient will complete toileting tasks     Dates: Start: 05/13/20       Description: 1) Individualized Goal:  with min A  2) Interventions:  OT Group Therapy, OT Self Care/ADL, OT Community Reintegration, OT Manual Ther Technique, OT Neuro Re-Ed/Balance, OT Sensory Int Techniques, OT Therapeutic Activity, OT Evaluation, and OT Therapeutic Exercise

## 2020-05-14 PROCEDURE — A9270 NON-COVERED ITEM OR SERVICE: HCPCS

## 2020-05-14 PROCEDURE — 700102 HCHG RX REV CODE 250 W/ 637 OVERRIDE(OP): Performed by: PHYSICAL MEDICINE & REHABILITATION

## 2020-05-14 PROCEDURE — 97110 THERAPEUTIC EXERCISES: CPT

## 2020-05-14 PROCEDURE — 700111 HCHG RX REV CODE 636 W/ 250 OVERRIDE (IP): Performed by: PHYSICAL MEDICINE & REHABILITATION

## 2020-05-14 PROCEDURE — A9270 NON-COVERED ITEM OR SERVICE: HCPCS | Performed by: PHYSICAL MEDICINE & REHABILITATION

## 2020-05-14 PROCEDURE — 770010 HCHG ROOM/CARE - REHAB SEMI PRIVAT*

## 2020-05-14 PROCEDURE — 97535 SELF CARE MNGMENT TRAINING: CPT

## 2020-05-14 PROCEDURE — 97116 GAIT TRAINING THERAPY: CPT

## 2020-05-14 PROCEDURE — 700102 HCHG RX REV CODE 250 W/ 637 OVERRIDE(OP)

## 2020-05-14 PROCEDURE — 97530 THERAPEUTIC ACTIVITIES: CPT

## 2020-05-14 PROCEDURE — 99233 SBSQ HOSP IP/OBS HIGH 50: CPT | Performed by: PHYSICAL MEDICINE & REHABILITATION

## 2020-05-14 RX ORDER — LISINOPRIL 20 MG/1
20 TABLET ORAL DAILY
Status: DISCONTINUED | OUTPATIENT
Start: 2020-05-15 | End: 2020-05-18

## 2020-05-14 RX ADMIN — LEVOTHYROXINE SODIUM 75 MCG: 75 TABLET ORAL at 05:30

## 2020-05-14 RX ADMIN — AMLODIPINE BESYLATE 5 MG: 5 TABLET ORAL at 08:41

## 2020-05-14 RX ADMIN — NYSTATIN: 100000 POWDER TOPICAL at 19:52

## 2020-05-14 RX ADMIN — ENOXAPARIN SODIUM 40 MG: 100 INJECTION SUBCUTANEOUS at 08:42

## 2020-05-14 RX ADMIN — TICAGRELOR 90 MG: 90 TABLET ORAL at 08:42

## 2020-05-14 RX ADMIN — GUAIFENESIN 200 MG: 100 SOLUTION ORAL at 20:23

## 2020-05-14 RX ADMIN — POTASSIUM CHLORIDE 20 MEQ: 1500 TABLET, EXTENDED RELEASE ORAL at 08:42

## 2020-05-14 RX ADMIN — ASPIRIN 81 MG: 81 TABLET, COATED ORAL at 08:40

## 2020-05-14 RX ADMIN — CARVEDILOL 12.5 MG: 12.5 TABLET, FILM COATED ORAL at 08:42

## 2020-05-14 RX ADMIN — OMEPRAZOLE 20 MG: 20 CAPSULE, DELAYED RELEASE ORAL at 08:41

## 2020-05-14 RX ADMIN — NYSTATIN: 100000 POWDER TOPICAL at 08:42

## 2020-05-14 RX ADMIN — DOCUSATE SODIUM 50 MG AND SENNOSIDES 8.6 MG 2 TABLET: 8.6; 5 TABLET, FILM COATED ORAL at 21:00

## 2020-05-14 RX ADMIN — BENZOCAINE AND MENTHOL 1 LOZENGE: 15; 3.6 LOZENGE ORAL at 19:43

## 2020-05-14 RX ADMIN — ATORVASTATIN CALCIUM 80 MG: 40 TABLET, FILM COATED ORAL at 19:53

## 2020-05-14 RX ADMIN — CARVEDILOL 12.5 MG: 12.5 TABLET, FILM COATED ORAL at 17:31

## 2020-05-14 RX ADMIN — MIRTAZAPINE 7.5 MG: 15 TABLET, ORALLY DISINTEGRATING ORAL at 19:54

## 2020-05-14 RX ADMIN — LISINOPRIL 15 MG: 5 TABLET ORAL at 08:41

## 2020-05-14 RX ADMIN — TICAGRELOR 90 MG: 90 TABLET ORAL at 19:59

## 2020-05-14 ASSESSMENT — GAIT ASSESSMENTS
DISTANCE (FEET): 10
ASSISTIVE DEVICE: PARALLEL BARS
GAIT LEVEL OF ASSIST: CONTACT GUARD ASSIST
DEVIATION: STEP TO;DECREASED BASE OF SUPPORT;BRADYKINETIC

## 2020-05-14 ASSESSMENT — ACTIVITIES OF DAILY LIVING (ADL)
TOILET_TRANSFER_DESCRIPTION: GRAB BAR;INCREASED TIME;SET-UP OF EQUIPMENT;SUPERVISION FOR SAFETY;VERBAL CUEING
BED_CHAIR_WHEELCHAIR_TRANSFER_DESCRIPTION: ADAPTIVE EQUIPMENT;INCREASED TIME;INITIAL PREPARATION FOR TASK;SUPERVISION FOR SAFETY;VERBAL CUEING
TOILET_TRANSFER_DESCRIPTION: GRAB BAR;SET-UP OF EQUIPMENT;SUPERVISION FOR SAFETY;VERBAL CUEING
TOILETING_LEVEL_OF_ASSIST_DESCRIPTION: ADAPTIVE EQUIPMENT;GRAB BAR;INCREASED TIME

## 2020-05-14 NOTE — THERAPY
Occupational Therapy  Daily Treatment     Patient Name: Sena Boyce  Age:  79 y.o., Sex:  female  Medical Record #: [id number]  Today's Date: 5/14/2020     Precautions  Precautions: (P) Fall Risk      Subjective    Pt reports feeling fatigued, but agreeable to OT session.     Objective       05/14/20 1331   Precautions   Precautions Fall Risk;Other (See Comments)   Cognition    Level of Consciousness Alert   Sitting Upper Body Exercises   Tricep Press 3 sets of 10;Bilateral;Weight (See Comments for lbs)  (Rickshaw facing fwd with 20#)   Upper Extremity Bike Level 3 Resistance  (BUE [employer] for UB strength/endurance x10 min, 0RB, .88mi)   Interdisciplinary Plan of Care Collaboration   IDT Collaboration with  Physical Therapist   Patient Position at End of Therapy Seated;Other (Comments)   Collaboration Comments transfer of care to PT   OT Total Time Spent   OT Individual Total Time Spent (Mins) 30   OT Charge Group   OT Therapeutic Exercise  2       Assessment    Pt tolerated session well with focus on UB strength and endurance training. Pt requires increased time to complete all exercises and rest breaks 2/2 fatigue. Pt hyperverbose and anxious at times, requires encouragement and reassurance during activity.     Plan    ADLs, standing activity tolerance, functional mobility, endurance, balance    Occupational Therapy Goals     Problem: Dressing     Dates: Start: 05/13/20       Goal: STG-Within one week, patient will dress UB     Dates: Start: 05/13/20       Description: 1) Individualized Goal:  with supervision  2) Interventions:  OT Group Therapy, OT Self Care/ADL, OT Community Reintegration, OT Manual Ther Technique, OT Neuro Re-Ed/Balance, OT Sensory Int Techniques, OT Therapeutic Activity, OT Evaluation, and OT Therapeutic Exercise      Note:     Goal Note filed on 05/14/20 1127 by Jeanette Figueroa MS,OTR/L    Alessandro completed 5/13                  Goal: STG-Within one week, patient will dress LB     Dates:  Start: 05/13/20       Description: 1) Individualized Goal:  with moderate assistance and AE  2) Interventions:  OT Group Therapy, OT Self Care/ADL, OT Community Reintegration, OT Manual Ther Technique, OT Neuro Re-Ed/Balance, OT Sensory Int Techniques, OT Therapeutic Activity, OT Evaluation, and OT Therapeutic Exercise      Note:     Goal Note filed on 05/14/20 1127 by Jeanette Figueroa, MS,OTR/L    Eval completed 5/13                        Problem: Functional Transfers     Dates: Start: 05/13/20       Goal: STG-Within one week, patient will transfer to toilet     Dates: Start: 05/13/20       Description: 1) Individualized Goal:  with min A  2) Interventions:  OT Group Therapy, OT Self Care/ADL, OT Community Reintegration, OT Manual Ther Technique, OT Neuro Re-Ed/Balance, OT Sensory Int Techniques, OT Therapeutic Activity, OT Evaluation, and OT Therapeutic Exercise      Note:     Goal Note filed on 05/14/20 1127 by Jeanette Figueroa MS,OTR/L    Eval completed 5/13                        Problem: OT Long Term Goals     Dates: Start: 05/13/20       Goal: LTG-By discharge, patient will complete basic self care tasks     Dates: Start: 05/13/20       Description: 1) Individualized Goal:  with Supervision  2) Interventions:  OT Group Therapy, OT Self Care/ADL, OT Community Reintegration, OT Manual Ther Technique, OT Neuro Re-Ed/Balance, OT Sensory Int Techniques, OT Therapeutic Activity, OT Evaluation, and OT Therapeutic Exercise            Goal: LTG-By discharge, patient will perform bathroom transfers     Dates: Start: 05/13/20       Description: 1) Individualized Goal:  with supervision  2) Interventions:  OT Group Therapy, OT Self Care/ADL, OT Community Reintegration, OT Manual Ther Technique, OT Neuro Re-Ed/Balance, OT Sensory Int Techniques, OT Therapeutic Activity, OT Evaluation, and OT Therapeutic Exercise                  Problem: Toileting     Dates: Start: 05/13/20       Description: 1) Individualized Goal:   with Min A  2) Interventions:  OT Group Therapy, OT Self Care/ADL, OT Community Reintegration, OT Manual Ther Technique, OT Neuro Re-Ed/Balance, OT Sensory Int Techniques, OT Therapeutic Activity, OT Evaluation, and OT Therapeutic Exercise      Goal: STG-Within one week, patient will complete toileting tasks     Dates: Start: 05/13/20       Description: 1) Individualized Goal:  with min A  2) Interventions:  OT Group Therapy, OT Self Care/ADL, OT Community Reintegration, OT Manual Ther Technique, OT Neuro Re-Ed/Balance, OT Sensory Int Techniques, OT Therapeutic Activity, OT Evaluation, and OT Therapeutic Exercise      Note:     Goal Note filed on 05/14/20 1127 by Jeanette Figueroa MS,OTR/L    Eval completed 5/13

## 2020-05-14 NOTE — CARE PLAN
Problem: Dressing  Goal: STG-Within one week, patient will dress UB  Description: 1) Individualized Goal:  with supervision  2) Interventions:  OT Group Therapy, OT Self Care/ADL, OT Community Reintegration, OT Manual Ther Technique, OT Neuro Re-Ed/Balance, OT Sensory Int Techniques, OT Therapeutic Activity, OT Evaluation, and OT Therapeutic Exercise    Outcome: NOT MET  Note:   Franciaal completed 5/13  Goal: STG-Within one week, patient will dress LB  Description: 1) Individualized Goal:  with moderate assistance and AE  2) Interventions:  OT Group Therapy, OT Self Care/ADL, OT Community Reintegration, OT Manual Ther Technique, OT Neuro Re-Ed/Balance, OT Sensory Int Techniques, OT Therapeutic Activity, OT Evaluation, and OT Therapeutic Exercise    Outcome: NOT MET  Note:   Franciaal completed 5/13     Problem: Toileting  Goal: STG-Within one week, patient will complete toileting tasks  Description: 1) Individualized Goal:  with min A  2) Interventions:  OT Group Therapy, OT Self Care/ADL, OT Community Reintegration, OT Manual Ther Technique, OT Neuro Re-Ed/Balance, OT Sensory Int Techniques, OT Therapeutic Activity, OT Evaluation, and OT Therapeutic Exercise    Outcome: NOT MET  Note:   Eval completed 5/13     Problem: Functional Transfers  Goal: STG-Within one week, patient will transfer to toilet  Description: 1) Individualized Goal:  with min A  2) Interventions:  OT Group Therapy, OT Self Care/ADL, OT Community Reintegration, OT Manual Ther Technique, OT Neuro Re-Ed/Balance, OT Sensory Int Techniques, OT Therapeutic Activity, OT Evaluation, and OT Therapeutic Exercise    Outcome: NOT MET  Note:   Alessandro completed 5/13

## 2020-05-14 NOTE — PROGRESS NOTES
"Rehab Progress Note     Encounter Date: 2020    CC: decreased mobility, fatigue    Interval Events (Subjective)  Patient sitting up in therapy gym. She reports she is wiped out from therapy. She reports she is willing to push herself. She reports her  was always into exercising and after he  she got rid of all of their exercise equipment. She reports she has been out of breath just getting around her house for the past 6 months. Denies NVD. Denies chest pain. Discussed will have IDT later today.    IDT Team Meeting 2020    IEver M.D., was present and led the interdisciplinary team conference on 2020.  I led the IDT conference and agree with the IDT conference documentation and plan of care as noted below.     RN:  Diet Regular   % Meal     Pain None   Sleep    Bowel Continent   Bladder Continent   In's & Out's    Breast fold nystatin    PT:  Bed Mobility maxA   Transfers Regina   Mobility    Stairs    Premorbidly Fernanda    OT:  Eating    Grooming    Bathing maxA   UB Dressing Regina   LB Dressing maxA   Toileting maxA   Shower & Transfer modA   Using slippers at home  Limited by swelling in feet  Limited by anxiety    CM:  Continues to work on disposition and DME needs.      DC/Disposition:  20    Objective:  VITAL SIGNS: /80   Pulse 62   Temp 36.9 °C (98.5 °F) (Temporal)   Resp 18   Ht 1.6 m (5' 3\")   Wt 90.7 kg (200 lb)   SpO2 94%   BMI 35.43 kg/m²   Gen: NAD  Psych: Mood and affect appropriate  CV: RRR, no edema  Resp: CTAB, no upper airway sounds  Abd: NTND  Neuro: AOx4, pushing wheelchair BUE  Skin: multiple healing lesions BLE, right abrasion on medial right ankle  Unchanged from 20    Recent Results (from the past 72 hour(s))   CBC WITH DIFFERENTIAL    Collection Time: 20  3:00 AM   Result Value Ref Range    WBC 8.6 4.8 - 10.8 K/uL    RBC 3.92 (L) 4.20 - 5.40 M/uL    Hemoglobin 12.2 12.0 - 16.0 g/dL    Hematocrit 36.8 (L) 37.0 - 47.0 %    " MCV 93.9 81.4 - 97.8 fL    MCH 31.1 27.0 - 33.0 pg    MCHC 33.2 (L) 33.6 - 35.0 g/dL    RDW 50.4 (H) 35.9 - 50.0 fL    Platelet Count 231 164 - 446 K/uL    MPV 10.9 9.0 - 12.9 fL    Neutrophils-Polys 70.60 44.00 - 72.00 %    Lymphocytes 11.60 (L) 22.00 - 41.00 %    Monocytes 15.10 (H) 0.00 - 13.40 %    Eosinophils 1.60 0.00 - 6.90 %    Basophils 0.60 0.00 - 1.80 %    Immature Granulocytes 0.50 0.00 - 0.90 %    Nucleated RBC 0.00 /100 WBC    Neutrophils (Absolute) 6.10 2.00 - 7.15 K/uL    Lymphs (Absolute) 1.00 1.00 - 4.80 K/uL    Monos (Absolute) 1.30 (H) 0.00 - 0.85 K/uL    Eos (Absolute) 0.14 0.00 - 0.51 K/uL    Baso (Absolute) 0.05 0.00 - 0.12 K/uL    Immature Granulocytes (abs) 0.04 0.00 - 0.11 K/uL    NRBC (Absolute) 0.00 K/uL   Comp Metabolic Panel    Collection Time: 05/12/20  3:00 AM   Result Value Ref Range    Sodium 139 135 - 145 mmol/L    Potassium 3.8 3.6 - 5.5 mmol/L    Chloride 105 96 - 112 mmol/L    Co2 20 20 - 33 mmol/L    Anion Gap 14.0 7.0 - 16.0    Glucose 114 (H) 65 - 99 mg/dL    Bun 23 (H) 8 - 22 mg/dL    Creatinine 0.86 0.50 - 1.40 mg/dL    Calcium 8.5 8.5 - 10.5 mg/dL    AST(SGOT) 44 12 - 45 U/L    ALT(SGPT) 44 2 - 50 U/L    Alkaline Phosphatase 138 (H) 30 - 99 U/L    Total Bilirubin 2.6 (H) 0.1 - 1.5 mg/dL    Albumin 3.2 3.2 - 4.9 g/dL    Total Protein 6.5 6.0 - 8.2 g/dL    Globulin 3.3 1.9 - 3.5 g/dL    A-G Ratio 1.0 g/dL   ESTIMATED GFR    Collection Time: 05/12/20  3:00 AM   Result Value Ref Range    GFR If African American >60 >60 mL/min/1.73 m 2    GFR If Non African American >60 >60 mL/min/1.73 m 2   COVID/SARS CoV-2    Collection Time: 05/12/20 10:14 AM    Specimen: Nasopharyngeal; Respirate   Result Value Ref Range    COVID Order Status Received    Influenza A/B By PCR (Adult - Flu Only)    Collection Time: 05/12/20 10:14 AM    Specimen: Nasopharyngeal; Respirate   Result Value Ref Range    Influenza virus A RNA Negative Negative    Influenza virus B, PCR Negative Negative    SARS-CoV-2, PCR (In-House)    Collection Time: 05/12/20 10:14 AM   Result Value Ref Range    SARS-CoV-2 Source NP Swab     SARS-CoV-2 by PCR NotDetected NotDetected   CBC with Differential    Collection Time: 05/13/20  5:08 AM   Result Value Ref Range    WBC 8.6 4.8 - 10.8 K/uL    RBC 3.72 (L) 4.20 - 5.40 M/uL    Hemoglobin 11.6 (L) 12.0 - 16.0 g/dL    Hematocrit 35.3 (L) 37.0 - 47.0 %    MCV 94.9 81.4 - 97.8 fL    MCH 31.2 27.0 - 33.0 pg    MCHC 32.9 (L) 33.6 - 35.0 g/dL    RDW 50.4 (H) 35.9 - 50.0 fL    Platelet Count 234 164 - 446 K/uL    MPV 11.6 9.0 - 12.9 fL    Neutrophils-Polys 73.20 (H) 44.00 - 72.00 %    Lymphocytes 9.60 (L) 22.00 - 41.00 %    Monocytes 15.50 (H) 0.00 - 13.40 %    Eosinophils 0.60 0.00 - 6.90 %    Basophils 0.50 0.00 - 1.80 %    Immature Granulocytes 0.60 0.00 - 0.90 %    Nucleated RBC 0.00 /100 WBC    Neutrophils (Absolute) 6.27 2.00 - 7.15 K/uL    Lymphs (Absolute) 0.82 (L) 1.00 - 4.80 K/uL    Monos (Absolute) 1.33 (H) 0.00 - 0.85 K/uL    Eos (Absolute) 0.05 0.00 - 0.51 K/uL    Baso (Absolute) 0.04 0.00 - 0.12 K/uL    Immature Granulocytes (abs) 0.05 0.00 - 0.11 K/uL    NRBC (Absolute) 0.00 K/uL   Comp Metabolic Panel (CMP)    Collection Time: 05/13/20  5:08 AM   Result Value Ref Range    Sodium 141 135 - 145 mmol/L    Potassium 3.1 (L) 3.6 - 5.5 mmol/L    Chloride 105 96 - 112 mmol/L    Co2 23 20 - 33 mmol/L    Anion Gap 13.0 7.0 - 16.0    Glucose 102 (H) 65 - 99 mg/dL    Bun 21 8 - 22 mg/dL    Creatinine 0.86 0.50 - 1.40 mg/dL    Calcium 8.7 8.5 - 10.5 mg/dL    AST(SGOT) 37 12 - 45 U/L    ALT(SGPT) 35 2 - 50 U/L    Alkaline Phosphatase 152 (H) 30 - 99 U/L    Total Bilirubin 2.5 (H) 0.1 - 1.5 mg/dL    Albumin 3.2 3.2 - 4.9 g/dL    Total Protein 6.3 6.0 - 8.2 g/dL    Globulin 3.1 1.9 - 3.5 g/dL    A-G Ratio 1.0 g/dL   HEMOGLOBIN A1C    Collection Time: 05/13/20  5:08 AM   Result Value Ref Range    Glycohemoglobin 5.7 (H) 0.0 - 5.6 %    Est Avg Glucose 117 mg/dL   TSH with Reflex to FT4     Collection Time: 05/13/20  5:08 AM   Result Value Ref Range    TSH 3.790 0.380 - 5.330 uIU/mL   Vitamin D, 25-hydroxy (blood)    Collection Time: 05/13/20  5:08 AM   Result Value Ref Range    25-Hydroxy   Vitamin D 25 22 (L) 30 - 100 ng/mL   ESTIMATED GFR    Collection Time: 05/13/20  5:08 AM   Result Value Ref Range    GFR If African American >60 >60 mL/min/1.73 m 2    GFR If Non African American >60 >60 mL/min/1.73 m 2       Current Facility-Administered Medications   Medication Frequency   • nystatin (MYCOSTATIN) powder BID   • amLODIPine (NORVASC) tablet 5 mg DAILY   • potassium chloride SA (Kdur) tablet 20 mEq DAILY   • lisinopril (PRINIVIL) tablet 15 mg DAILY   • Respiratory Therapy Consult Continuous RT   • Pharmacy Consult Request ...Pain Management Review 1 Each PHARMACY TO DOSE   • oxyCODONE immediate-release (ROXICODONE) tablet 5 mg Q3HRS PRN   • oxyCODONE immediate release (ROXICODONE) tablet 10 mg Q3HRS PRN   • tramadol (ULTRAM) 50 MG tablet 50 mg Q4HRS PRN   • hydrALAZINE (APRESOLINE) tablet 25 mg Q8HRS PRN   • acetaminophen (TYLENOL) tablet 650 mg Q4HRS PRN   • senna-docusate (PERICOLACE or SENOKOT S) 8.6-50 MG per tablet 2 Tab BID    And   • polyethylene glycol/lytes (MIRALAX) PACKET 1 Packet QDAY PRN    And   • magnesium hydroxide (MILK OF MAGNESIA) suspension 30 mL QDAY PRN    And   • bisacodyl (DULCOLAX) suppository 10 mg QDAY PRN   • artificial tears ophthalmic solution 1 Drop PRN   • benzocaine-menthol (CEPACOL) lozenge 1 Lozenge Q2HRS PRN   • mag hydrox-al hydrox-simeth (MAALOX PLUS ES or MYLANTA DS) suspension 20 mL Q2HRS PRN   • ondansetron (ZOFRAN ODT) dispertab 4 mg 4X/DAY PRN    Or   • ondansetron (ZOFRAN) syringe/vial injection 4 mg 4X/DAY PRN   • traZODone (DESYREL) tablet 50 mg QHS PRN   • sodium chloride (OCEAN) 0.65 % nasal spray 2 Spray PRN   • enoxaparin (LOVENOX) inj 40 mg DAILY   • ticagrelor (BRILINTA) tablet 90 mg BID   • aspirin EC (ECOTRIN) tablet 81 mg DAILY   •  atorvastatin (LIPITOR) tablet 80 mg Nightly   • carvedilol (COREG) tablet 12.5 mg BID WITH MEALS   • levothyroxine (SYNTHROID) tablet 75 mcg AM ES   • mirtazapine (REMERON) orally disintegrating tab 7.5 mg QHS   • omeprazole (PRILOSEC) capsule 20 mg DAILY       Orders Placed This Encounter   Procedures   • Diet Order Cardiac     Standing Status:   Standing     Number of Occurrences:   1     Order Specific Question:   Diet:     Answer:   Cardiac [6]       Assessment:  Active Hospital Problems    Diagnosis   • *ACS (acute coronary syndrome) (HCC)   • Hypercholesteremia   • History of coronary angioplasty with insertion of stent   • Acquired hypothyroidism   • Essential hypertension       Medical Decision Making and Plan:  ACS s/p FELIPE x3 - presented from OSH with chest pain, s/p stenting on 5/8/20 with Dr. Olivas. Continue ASA, Brilinta.  -PT and OT for mobility and ADLs  -Follow-up cardiology, PCP     HTN - Patient on Coreg 12.5 mg BID, Lisinopril 15 mg daily. Previously on Amlodipine and Valsartan  -Elevated on Admission, restart Amlodipine 5 mg, into 140s and 130s, will increase Lisinopril to 20 mg     HLD - Patient on Atorvastatin 80 mg daily     LE edema - Continue to monitor     Anemia - mild on admission labs at 11.6, continue to monitor    Hypokalemia - 3.1 on admission. Start K supplement, recheck in 3 days    Mood - Patient on Remeron 7.5 mg qHS at home.     Vitamin D deficiency - 22 on admission start 1000 U    Obesity, class II - BMI of 35.43 on admission. Dietitian to consult    GI - Start on Omeprazole.      DVT Ppx - Patient on Lovenox on transfer.    Total time:  35 minutes.  I spent greater than 50% of the time for patient care, counseling, and coordination on this date, including unit/floor time, and face-to-face time with the patient as per interval events and assessment and plan above. Topics discussed included ongoing elevated SBP, increase ACEI, discharge planning and discussed home exercise.  Patient was discussed separately in IDT today; please see details above.    Ever Longo M.D.

## 2020-05-14 NOTE — THERAPY
"Physical Therapy   Daily Treatment     Patient Name: Sena Boyce  Age:  79 y.o., Sex:  female  Medical Record #: [id number]  Today's Date: 5/14/2020     Precautions  Precautions: Fall Risk    Subjective    Pt found in room in w/c, agreeable to therapy.  Denies pain.  \"I couldn't stand those compression stocks, they felt like tourniquets.\"      Objective       05/14/20 1031   Precautions   Precautions Fall Risk   Vitals   Pulse 62   Pulse Oximetry 94 %   Vitals Comments during LE motomed    Pain 0 - 10 Group   Therapist Pain Assessment Prior to Activity  (pt denies pain issues)   Gait Functional Level of Assist    Gait Level Of Assist Contact Guard Assist   Assistive Device Parallel Bars   Distance (Feet) 10   # of Times Distance was Traveled 1   Deviation Step To;Decreased Base Of Support;Bradykinetic   Transfer Functional Level of Assist   Toilet Transfers Minimal Assist   Toilet Transfer Description Grab bar;Set-up of equipment;Supervision for safety;Verbal cueing   Sitting Lower Body Exercises   Other Exercises LE motomed gear 0 5min x2 with 2 min rest break, total .59miles, av 23 RPM, vitals monitored thorughout.   Bed Mobility    Sit to Stand Contact Guard Assist   Interdisciplinary Plan of Care Collaboration   IDT Collaboration with  Physician   Collaboration Comments re: rounding w/ pt, discussed initial plan for length of stay, CLOF   PT Total Time Spent   PT Individual Total Time Spent (Mins) 60   PT Charge Group   PT Gait Training 2   PT Therapeutic Exercise 1   PT Therapeutic Activities 1     Pt performed toilet transfer with set up of equipement and cues for sequencing.  Pt able to perform hygiene in standing, w/ grab bar and CGA with cues for seuqencing.     Seated there ex prior to gait training: marching, LAQ's, ankle pumps, hip abd/add x10 ea.  Assessment    Pt requires significant increase in time to perform all activities.  Pt very anxious regarding mobility and LE strength.    Plan    Gait " training in // bar w/ vector, endurance LE and UE, LE strengthening, standing balance    Physical Therapy Problems     Problem: Balance     Dates: Start: 05/13/20       Goal: STG-Within one week, patient will maintain static standing     Dates: Start: 05/13/20       Description: 1) Individualized goal:  3 min in // bars with min A   2) Interventions:  PT Group Therapy, PT E Stim Attended, PT Gait Training, PT Therapeutic Exercises, PT Neuro Re-Ed/Balance, PT Therapeutic Activity, and PT Manual Therapy      Note:     Goal Note filed on 05/13/20 1608 by Barbara Michaud, PT    Alessandro completed 5/13                        Problem: Mobility     Dates: Start: 05/13/20       Description:       Goal: STG-Within one week, patient will propel wheelchair community     Dates: Start: 05/13/20       Description: 1) Individualized goal:  50 ft using UEs and/or LEs, SBA   2) Interventions: PT Group Therapy, PT E Stim Attended, PT Gait Training, PT Therapeutic Exercises, PT Neuro Re-Ed/Balance, PT Therapeutic Activity, and PT Manual Therapy          Note:     Goal Note filed on 05/13/20 1608 by Barbara Michaud, PT    Eval completed 5/13                  Goal: STG-Within one week, patient will ambulate household distance     Dates: Start: 05/13/20       Description: 1) Individualized goal:  10 ft in // bars with mod A   2) Interventions: PT Group Therapy, PT E Stim Attended, PT Gait Training, PT Therapeutic Exercises, PT Neuro Re-Ed/Balance, PT Therapeutic Activity, and PT Manual Therapy          Note:     Goal Note filed on 05/13/20 1608 by Barbara Michaud, PT    Alessandro completed 5/13                        Problem: Mobility Transfers     Dates: Start: 05/13/20       Goal: STG-Within one week, patient will transfer bed to chair     Dates: Start: 05/13/20       Description: 1) Individualized goal:  mod A SPT or squat pivot with AD as needed   2) Interventions: PT Group Therapy, PT E Stim Attended, PT Gait Training, PT Therapeutic Exercises, PT Neuro  Re-Ed/Balance, PT Therapeutic Activity, and PT Manual Therapy          Note:     Goal Note filed on 05/13/20 1608 by Barbara Michaud, PT    Alessandro completed 5/13                        Problem: PT-Long Term Goals     Dates: Start: 05/13/20       Goal: LTG-By discharge, patient will ambulate     Dates: Start: 05/13/20       Description: 150 ft with LRAD, spv           Goal: LTG-By discharge, patient will transfer one surface to another     Dates: Start: 05/13/20       Description: Spv with LRAD           Goal: LTG-By discharge, patient will ambulate up/down 4-6 stairs     Dates: Start: 05/13/20       Description: 1) Individualized goal:  2 steps with single HR and CGA   2) Interventions: PT Group Therapy, PT E Stim Attended, PT Gait Training, PT Therapeutic Exercises, PT Neuro Re-Ed/Balance, PT Therapeutic Activity, and PT Manual Therapy                Goal: LTG-By discharge, patient will transfer in/out of a car     Dates: Start: 05/13/20       Description: 1) Individualized goal:  Spv with LRAD   2) Interventions: PT Group Therapy, PT E Stim Attended, PT Gait Training, PT Therapeutic Exercises, PT Neuro Re-Ed/Balance, PT Therapeutic Activity, and PT Manual Therapy

## 2020-05-14 NOTE — CARE PLAN
Problem: Safety  Goal: Will remain free from falls  Intervention: Implement fall precautions  Note: Appropriately uses call light to make needs known.Fall precautions and frequent rounding in place for safety.Call light within reach.Will continue to monitor and assess needs and safety.     Problem: Pain Management  Goal: Pain level will decrease to patient's comfort goal  Note: Pt is calm, comfortable and no sign of acute distress noted.Repositioned with pillows for comfort.Will continue to monitor and assess pain level and medicate as needed.

## 2020-05-14 NOTE — THERAPY
"Occupational Therapy  Daily Treatment     Patient Name: Sena Boyce  Age:  79 y.o., Sex:  female  Medical Record #: [id number]  Today's Date: 5/14/2020     Precautions  Precautions: (P) Fall Risk    Safety   ADL Safety : (P) Requires Physical Assist for Safety, Requires Supervision for Safety    Subjective    \"I can't wear these. No way. Maybe we try in a few weeks.\" re: compression socks. OTR educated pt on reason for compression socks and necessity of them. Pt still refused after they were on and said she would be willing to try again tomorrow.     \"Things are getting better.\"     Objective       05/14/20 0931   Precautions   Precautions Fall Risk   Safety    ADL Safety  Requires Physical Assist for Safety;Requires Supervision for Safety   Non Verbal Descriptors   Non Verbal Scale  Calm   Cognition    Level of Consciousness Alert   Sleep/Wake Cycle   Sleep & Rest Awake   Functional Level of Assist   Lower Body Dressing Moderate Assist   Lower Body Dressing Description   (socks; sock aid too small for RLE. Attempted two styles)   Sitting Lower Body Exercises   Sit to Stand 1 set of 15  (in // bars)   Other Exercises stood for 10 sec for first 5 reps; 30 for next 5; and 1 minute for last 5 reps; breaks between each rep;    Balance   Sitting Balance (Static) Fair -   Sitting Balance (Dynamic) Poor +   Standing Balance (Static) Poor   Standing Balance (Dynamic) Poor   Interdisciplinary Plan of Care Collaboration   IDT Collaboration with  Nursing   Patient Position at End of Therapy Seated;Call Light within Reach;Tray Table within Reach;Phone within Reach   Collaboration Comments re: size of compression socks   OT Total Time Spent   OT Individual Total Time Spent (Mins) 60   OT Charge Group   OT Self Care / ADL 2   OT Therapeutic Exercise  2     Education on use of sock aid. Pt able to use on L foot, but will need a bigger sock aid for R foot.    Assessment    Pt with decreased endurance and hyperverbose impacting " session. Pt needs a break between each repetition of standing. Pt was able to understand and practice sock aid, but will need a larger one for the R foot due to LE edema.    Plan    ADLs, standing activity tolerance, functional mobility, endurance, balance    Occupational Therapy Goals     Problem: Dressing     Dates: Start: 05/13/20       Goal: STG-Within one week, patient will dress UB     Dates: Start: 05/13/20       Description: 1) Individualized Goal:  with supervision  2) Interventions:  OT Group Therapy, OT Self Care/ADL, OT Community Reintegration, OT Manual Ther Technique, OT Neuro Re-Ed/Balance, OT Sensory Int Techniques, OT Therapeutic Activity, OT Evaluation, and OT Therapeutic Exercise      Note:     Goal Note filed on 05/14/20 1127 by Jeanette Figueroa MS,OTR/L    Alessandro completed 5/13                  Goal: STG-Within one week, patient will dress LB     Dates: Start: 05/13/20       Description: 1) Individualized Goal:  with moderate assistance and AE  2) Interventions:  OT Group Therapy, OT Self Care/ADL, OT Community Reintegration, OT Manual Ther Technique, OT Neuro Re-Ed/Balance, OT Sensory Int Techniques, OT Therapeutic Activity, OT Evaluation, and OT Therapeutic Exercise      Note:     Goal Note filed on 05/14/20 1127 by Jeanette Figueroa MS,OTR/L    Alessandro completed 5/13                        Problem: Functional Transfers     Dates: Start: 05/13/20       Goal: STG-Within one week, patient will transfer to toilet     Dates: Start: 05/13/20       Description: 1) Individualized Goal:  with min A  2) Interventions:  OT Group Therapy, OT Self Care/ADL, OT Community Reintegration, OT Manual Ther Technique, OT Neuro Re-Ed/Balance, OT Sensory Int Techniques, OT Therapeutic Activity, OT Evaluation, and OT Therapeutic Exercise      Note:     Goal Note filed on 05/14/20 1127 by Jeanette Figueroa MS,OTR/L    Alessandro completed 5/13                        Problem: OT Long Term Goals     Dates: Start: 05/13/20        Goal: LTG-By discharge, patient will complete basic self care tasks     Dates: Start: 05/13/20       Description: 1) Individualized Goal:  with Supervision  2) Interventions:  OT Group Therapy, OT Self Care/ADL, OT Community Reintegration, OT Manual Ther Technique, OT Neuro Re-Ed/Balance, OT Sensory Int Techniques, OT Therapeutic Activity, OT Evaluation, and OT Therapeutic Exercise            Goal: LTG-By discharge, patient will perform bathroom transfers     Dates: Start: 05/13/20       Description: 1) Individualized Goal:  with supervision  2) Interventions:  OT Group Therapy, OT Self Care/ADL, OT Community Reintegration, OT Manual Ther Technique, OT Neuro Re-Ed/Balance, OT Sensory Int Techniques, OT Therapeutic Activity, OT Evaluation, and OT Therapeutic Exercise                  Problem: Toileting     Dates: Start: 05/13/20       Description: 1) Individualized Goal:  with Min A  2) Interventions:  OT Group Therapy, OT Self Care/ADL, OT Community Reintegration, OT Manual Ther Technique, OT Neuro Re-Ed/Balance, OT Sensory Int Techniques, OT Therapeutic Activity, OT Evaluation, and OT Therapeutic Exercise      Goal: STG-Within one week, patient will complete toileting tasks     Dates: Start: 05/13/20       Description: 1) Individualized Goal:  with min A  2) Interventions:  OT Group Therapy, OT Self Care/ADL, OT Community Reintegration, OT Manual Ther Technique, OT Neuro Re-Ed/Balance, OT Sensory Int Techniques, OT Therapeutic Activity, OT Evaluation, and OT Therapeutic Exercise      Note:     Goal Note filed on 05/14/20 1127 by Jeanette Figueroa MS,OTR/L    Eval completed 5/13

## 2020-05-14 NOTE — THERAPY
"Physical Therapy   Daily Treatment     Patient Name: Sena Boyce  Age:  79 y.o., Sex:  female  Medical Record #: [id number]  Today's Date: 5/14/2020     Precautions  Precautions: Fall Risk    Subjective    Pt up in w/c at start of session.  \"My legs are feeling sore and swollen.\"     Objective       05/14/20 1401   Precautions   Precautions Fall Risk   Transfer Functional Level of Assist   Bed, Chair, Wheelchair Transfer Moderate Assist   Bed Chair Wheelchair Transfer Description Assist with two limbs;Increased time   Standing Lower Body Exercises   Other Exercises Standing tolerance in // bars w/ playing ladder ball x4', x3'50\" SBA   Bed Mobility    Sit to Supine Moderate Assist   PT Total Time Spent   PT Individual Total Time Spent (Mins) 30   PT Charge Group   PT Therapeutic Activities 2       Assessment    Pt requires increased time to perform all activities & set up of w/c for transfers.  Improving strength and confidence with mobility.  Pt having swelling in LE's, returned to bed and edu on wearing JOELLEN's vs ace wraps tmw for therapy.  RN notified.    Plan    Gait in // bars w/ vector system, LE strengthening, endurance w/c skills    Physical Therapy Problems     Problem: Balance     Dates: Start: 05/13/20       Goal: STG-Within one week, patient will maintain static standing     Dates: Start: 05/13/20       Description: 1) Individualized goal:  3 min in // bars with min A   2) Interventions:  PT Group Therapy, PT E Stim Attended, PT Gait Training, PT Therapeutic Exercises, PT Neuro Re-Ed/Balance, PT Therapeutic Activity, and PT Manual Therapy      Note:     Goal Note filed on 05/13/20 0103 by Barbara Michaud, PT    Alessandro completed 5/13                        Problem: Mobility     Dates: Start: 05/13/20       Description:       Goal: STG-Within one week, patient will propel wheelchair community     Dates: Start: 05/13/20       Description: 1) Individualized goal:  50 ft using UEs and/or LEs, SBA   2) Interventions: " PT Group Therapy, PT E Stim Attended, PT Gait Training, PT Therapeutic Exercises, PT Neuro Re-Ed/Balance, PT Therapeutic Activity, and PT Manual Therapy          Note:     Goal Note filed on 05/13/20 1608 by Barbara Michaud, PT    Franciaal completed 5/13                  Goal: STG-Within one week, patient will ambulate household distance     Dates: Start: 05/13/20       Description: 1) Individualized goal:  10 ft in // bars with mod A   2) Interventions: PT Group Therapy, PT E Stim Attended, PT Gait Training, PT Therapeutic Exercises, PT Neuro Re-Ed/Balance, PT Therapeutic Activity, and PT Manual Therapy          Note:     Goal Note filed on 05/13/20 1608 by Barbara Michaud, PT    Franciaal completed 5/13                        Problem: Mobility Transfers     Dates: Start: 05/13/20       Goal: STG-Within one week, patient will transfer bed to chair     Dates: Start: 05/13/20       Description: 1) Individualized goal:  mod A SPT or squat pivot with AD as needed   2) Interventions: PT Group Therapy, PT E Stim Attended, PT Gait Training, PT Therapeutic Exercises, PT Neuro Re-Ed/Balance, PT Therapeutic Activity, and PT Manual Therapy          Note:     Goal Note filed on 05/13/20 1608 by Barbara Michaud, PT    Franciaal completed 5/13                        Problem: PT-Long Term Goals     Dates: Start: 05/13/20       Goal: LTG-By discharge, patient will ambulate     Dates: Start: 05/13/20       Description: 150 ft with LRAD, spv           Goal: LTG-By discharge, patient will transfer one surface to another     Dates: Start: 05/13/20       Description: Spv with LRAD           Goal: LTG-By discharge, patient will ambulate up/down 4-6 stairs     Dates: Start: 05/13/20       Description: 1) Individualized goal:  2 steps with single HR and CGA   2) Interventions: PT Group Therapy, PT E Stim Attended, PT Gait Training, PT Therapeutic Exercises, PT Neuro Re-Ed/Balance, PT Therapeutic Activity, and PT Manual Therapy                Goal: LTG-By discharge,  patient will transfer in/out of a car     Dates: Start: 05/13/20       Description: 1) Individualized goal:  Spv with LRAD   2) Interventions: PT Group Therapy, PT E Stim Attended, PT Gait Training, PT Therapeutic Exercises, PT Neuro Re-Ed/Balance, PT Therapeutic Activity, and PT Manual Therapy

## 2020-05-15 PROCEDURE — 99232 SBSQ HOSP IP/OBS MODERATE 35: CPT | Performed by: PHYSICAL MEDICINE & REHABILITATION

## 2020-05-15 PROCEDURE — A9270 NON-COVERED ITEM OR SERVICE: HCPCS | Performed by: PHYSICAL MEDICINE & REHABILITATION

## 2020-05-15 PROCEDURE — 97110 THERAPEUTIC EXERCISES: CPT | Mod: CQ

## 2020-05-15 PROCEDURE — 700102 HCHG RX REV CODE 250 W/ 637 OVERRIDE(OP): Performed by: PHYSICAL MEDICINE & REHABILITATION

## 2020-05-15 PROCEDURE — 97110 THERAPEUTIC EXERCISES: CPT

## 2020-05-15 PROCEDURE — 97116 GAIT TRAINING THERAPY: CPT | Mod: CQ

## 2020-05-15 PROCEDURE — 700111 HCHG RX REV CODE 636 W/ 250 OVERRIDE (IP): Performed by: PHYSICAL MEDICINE & REHABILITATION

## 2020-05-15 PROCEDURE — 97530 THERAPEUTIC ACTIVITIES: CPT | Mod: CQ

## 2020-05-15 PROCEDURE — 770010 HCHG ROOM/CARE - REHAB SEMI PRIVAT*

## 2020-05-15 PROCEDURE — 97535 SELF CARE MNGMENT TRAINING: CPT

## 2020-05-15 RX ORDER — AMLODIPINE BESYLATE 5 MG/1
10 TABLET ORAL DAILY
Status: DISCONTINUED | OUTPATIENT
Start: 2020-05-16 | End: 2020-05-26 | Stop reason: HOSPADM

## 2020-05-15 RX ADMIN — DOCUSATE SODIUM 50 MG AND SENNOSIDES 8.6 MG 2 TABLET: 8.6; 5 TABLET, FILM COATED ORAL at 09:00

## 2020-05-15 RX ADMIN — CARVEDILOL 12.5 MG: 12.5 TABLET, FILM COATED ORAL at 09:44

## 2020-05-15 RX ADMIN — GUAIFENESIN 200 MG: 100 SOLUTION ORAL at 09:52

## 2020-05-15 RX ADMIN — CARVEDILOL 12.5 MG: 12.5 TABLET, FILM COATED ORAL at 18:00

## 2020-05-15 RX ADMIN — NYSTATIN: 100000 POWDER TOPICAL at 22:32

## 2020-05-15 RX ADMIN — ASPIRIN 81 MG: 81 TABLET, COATED ORAL at 09:44

## 2020-05-15 RX ADMIN — LISINOPRIL 20 MG: 20 TABLET ORAL at 09:44

## 2020-05-15 RX ADMIN — ATORVASTATIN CALCIUM 80 MG: 40 TABLET, FILM COATED ORAL at 22:26

## 2020-05-15 RX ADMIN — TICAGRELOR 90 MG: 90 TABLET ORAL at 09:44

## 2020-05-15 RX ADMIN — LEVOTHYROXINE SODIUM 75 MCG: 75 TABLET ORAL at 05:13

## 2020-05-15 RX ADMIN — POTASSIUM CHLORIDE 20 MEQ: 1500 TABLET, EXTENDED RELEASE ORAL at 09:44

## 2020-05-15 RX ADMIN — GUAIFENESIN 200 MG: 100 SOLUTION ORAL at 22:38

## 2020-05-15 RX ADMIN — AMLODIPINE BESYLATE 5 MG: 5 TABLET ORAL at 09:46

## 2020-05-15 RX ADMIN — TICAGRELOR 90 MG: 90 TABLET ORAL at 22:27

## 2020-05-15 RX ADMIN — MIRTAZAPINE 7.5 MG: 15 TABLET, ORALLY DISINTEGRATING ORAL at 22:27

## 2020-05-15 RX ADMIN — ENOXAPARIN SODIUM 40 MG: 100 INJECTION SUBCUTANEOUS at 09:45

## 2020-05-15 RX ADMIN — OMEPRAZOLE 20 MG: 20 CAPSULE, DELAYED RELEASE ORAL at 09:44

## 2020-05-15 ASSESSMENT — GAIT ASSESSMENTS
DEVIATION: DECREASED TOE OFF;DECREASED HEEL STRIKE;SHUFFLED GAIT
GAIT LEVEL OF ASSIST: MINIMAL ASSIST
ASSISTIVE DEVICE: FRONT WHEEL WALKER

## 2020-05-15 ASSESSMENT — ACTIVITIES OF DAILY LIVING (ADL)
TOILET_TRANSFER_DESCRIPTION: GRAB BAR;SET-UP OF EQUIPMENT;VERBAL CUEING;INCREASED TIME
TUB_SHOWER_TRANSFER_DESCRIPTION: SET-UP OF EQUIPMENT;SUPERVISION FOR SAFETY;VERBAL CUEING;GRAB BAR
TOILETING_LEVEL_OF_ASSIST_DESCRIPTION: GRAB BAR;INCREASED TIME;SET-UP OF EQUIPMENT;SUPERVISION FOR SAFETY;VERBAL CUEING
BED_CHAIR_WHEELCHAIR_TRANSFER_DESCRIPTION: INCREASED TIME;VERBAL CUEING;SUPERVISION FOR SAFETY;SET-UP OF EQUIPMENT
BED_CHAIR_WHEELCHAIR_TRANSFER_DESCRIPTION: VERBAL CUEING;SUPERVISION FOR SAFETY;INCREASED TIME

## 2020-05-15 NOTE — PROGRESS NOTES
Change of shift report obtained, vitals wnl, denies pain, care resumed. Patient cough resolved w/ prn medications. Rested comfortably all shift, no issues or concerns

## 2020-05-15 NOTE — PROGRESS NOTES
"Rehab Progress Note     Encounter Date: 5/15/2020    CC: decreased mobility, fatigue    Interval Events (Subjective)  Patient sitting up in room. Reviewed that UA was negative. Discussed results of IDT including significant endurance loss from limited exercise for past 6 months. Discussed plan for discharge on 5/26/20 and then most likely home health. She has questions about what DME was requested. Discussed that we will know more likely next Thursday. She really would like a hospital bed, discussed that would have CM go over details but very difficult to get hospital bed approved. She reports her family is helping her rearrange and do small remodels around her home. Denies NVD.    IDT Team Meeting 5/14/2020  DC/Disposition:  5/26/20    Objective:  VITAL SIGNS: /79   Pulse 64   Temp 36.4 °C (97.6 °F) (Temporal)   Resp 18   Ht 1.6 m (5' 3\")   Wt 90.7 kg (200 lb)   SpO2 94%   BMI 35.43 kg/m²   Gen: NAD  Psych: Mood and affect appropriate  CV: RRR, 1+ edema  Resp: CTAB, no upper airway sounds  Abd: NTND  Neuro: AOx4, 5/5 BUE, fatigued easy pushing WC    Recent Results (from the past 72 hour(s))   CBC with Differential    Collection Time: 05/13/20  5:08 AM   Result Value Ref Range    WBC 8.6 4.8 - 10.8 K/uL    RBC 3.72 (L) 4.20 - 5.40 M/uL    Hemoglobin 11.6 (L) 12.0 - 16.0 g/dL    Hematocrit 35.3 (L) 37.0 - 47.0 %    MCV 94.9 81.4 - 97.8 fL    MCH 31.2 27.0 - 33.0 pg    MCHC 32.9 (L) 33.6 - 35.0 g/dL    RDW 50.4 (H) 35.9 - 50.0 fL    Platelet Count 234 164 - 446 K/uL    MPV 11.6 9.0 - 12.9 fL    Neutrophils-Polys 73.20 (H) 44.00 - 72.00 %    Lymphocytes 9.60 (L) 22.00 - 41.00 %    Monocytes 15.50 (H) 0.00 - 13.40 %    Eosinophils 0.60 0.00 - 6.90 %    Basophils 0.50 0.00 - 1.80 %    Immature Granulocytes 0.60 0.00 - 0.90 %    Nucleated RBC 0.00 /100 WBC    Neutrophils (Absolute) 6.27 2.00 - 7.15 K/uL    Lymphs (Absolute) 0.82 (L) 1.00 - 4.80 K/uL    Monos (Absolute) 1.33 (H) 0.00 - 0.85 K/uL    Eos " (Absolute) 0.05 0.00 - 0.51 K/uL    Baso (Absolute) 0.04 0.00 - 0.12 K/uL    Immature Granulocytes (abs) 0.05 0.00 - 0.11 K/uL    NRBC (Absolute) 0.00 K/uL   Comp Metabolic Panel (CMP)    Collection Time: 05/13/20  5:08 AM   Result Value Ref Range    Sodium 141 135 - 145 mmol/L    Potassium 3.1 (L) 3.6 - 5.5 mmol/L    Chloride 105 96 - 112 mmol/L    Co2 23 20 - 33 mmol/L    Anion Gap 13.0 7.0 - 16.0    Glucose 102 (H) 65 - 99 mg/dL    Bun 21 8 - 22 mg/dL    Creatinine 0.86 0.50 - 1.40 mg/dL    Calcium 8.7 8.5 - 10.5 mg/dL    AST(SGOT) 37 12 - 45 U/L    ALT(SGPT) 35 2 - 50 U/L    Alkaline Phosphatase 152 (H) 30 - 99 U/L    Total Bilirubin 2.5 (H) 0.1 - 1.5 mg/dL    Albumin 3.2 3.2 - 4.9 g/dL    Total Protein 6.3 6.0 - 8.2 g/dL    Globulin 3.1 1.9 - 3.5 g/dL    A-G Ratio 1.0 g/dL   HEMOGLOBIN A1C    Collection Time: 05/13/20  5:08 AM   Result Value Ref Range    Glycohemoglobin 5.7 (H) 0.0 - 5.6 %    Est Avg Glucose 117 mg/dL   TSH with Reflex to FT4    Collection Time: 05/13/20  5:08 AM   Result Value Ref Range    TSH 3.790 0.380 - 5.330 uIU/mL   Vitamin D, 25-hydroxy (blood)    Collection Time: 05/13/20  5:08 AM   Result Value Ref Range    25-Hydroxy   Vitamin D 25 22 (L) 30 - 100 ng/mL   ESTIMATED GFR    Collection Time: 05/13/20  5:08 AM   Result Value Ref Range    GFR If African American >60 >60 mL/min/1.73 m 2    GFR If Non African American >60 >60 mL/min/1.73 m 2       Current Facility-Administered Medications   Medication Frequency   • lisinopril (PRINIVIL) tablet 20 mg DAILY   • guaiFENesin (ROBITUSSIN) 100 MG/5ML solution 200 mg Q4HRS PRN   • nystatin (MYCOSTATIN) powder BID   • amLODIPine (NORVASC) tablet 5 mg DAILY   • potassium chloride SA (Kdur) tablet 20 mEq DAILY   • Respiratory Therapy Consult Continuous RT   • Pharmacy Consult Request ...Pain Management Review 1 Each PHARMACY TO DOSE   • oxyCODONE immediate-release (ROXICODONE) tablet 5 mg Q3HRS PRN   • oxyCODONE immediate release (ROXICODONE) tablet  10 mg Q3HRS PRN   • tramadol (ULTRAM) 50 MG tablet 50 mg Q4HRS PRN   • hydrALAZINE (APRESOLINE) tablet 25 mg Q8HRS PRN   • acetaminophen (TYLENOL) tablet 650 mg Q4HRS PRN   • senna-docusate (PERICOLACE or SENOKOT S) 8.6-50 MG per tablet 2 Tab BID    And   • polyethylene glycol/lytes (MIRALAX) PACKET 1 Packet QDAY PRN    And   • magnesium hydroxide (MILK OF MAGNESIA) suspension 30 mL QDAY PRN    And   • bisacodyl (DULCOLAX) suppository 10 mg QDAY PRN   • artificial tears ophthalmic solution 1 Drop PRN   • benzocaine-menthol (CEPACOL) lozenge 1 Lozenge Q2HRS PRN   • mag hydrox-al hydrox-simeth (MAALOX PLUS ES or MYLANTA DS) suspension 20 mL Q2HRS PRN   • ondansetron (ZOFRAN ODT) dispertab 4 mg 4X/DAY PRN    Or   • ondansetron (ZOFRAN) syringe/vial injection 4 mg 4X/DAY PRN   • traZODone (DESYREL) tablet 50 mg QHS PRN   • sodium chloride (OCEAN) 0.65 % nasal spray 2 Spray PRN   • enoxaparin (LOVENOX) inj 40 mg DAILY   • ticagrelor (BRILINTA) tablet 90 mg BID   • aspirin EC (ECOTRIN) tablet 81 mg DAILY   • atorvastatin (LIPITOR) tablet 80 mg Nightly   • carvedilol (COREG) tablet 12.5 mg BID WITH MEALS   • levothyroxine (SYNTHROID) tablet 75 mcg AM ES   • mirtazapine (REMERON) orally disintegrating tab 7.5 mg QHS   • omeprazole (PRILOSEC) capsule 20 mg DAILY       Orders Placed This Encounter   Procedures   • Diet Order Cardiac     Standing Status:   Standing     Number of Occurrences:   1     Order Specific Question:   Diet:     Answer:   Cardiac [6]       Assessment:  Active Hospital Problems    Diagnosis   • *ACS (acute coronary syndrome) (HCC)   • Hypercholesteremia   • History of coronary angioplasty with insertion of stent   • Acquired hypothyroidism   • Essential hypertension       Medical Decision Making and Plan:  ACS s/p FELIPE x3 - presented from OSH with chest pain, s/p stenting on 5/8/20 with Dr. Olivas. Continue ASA, Brilinta.  -PT and OT for mobility and ADLs  -Follow-up cardiology, PCP     HTN - Patient  on Coreg 12.5 mg BID, Lisinopril 15 mg daily. Previously on Amlodipine and Valsartan  -Elevated on Admission, restart Amlodipine 5 mg, into 140s and 130s, will increase Lisinopril to 20 mg. Increase Amlodipine to 10 mg and monitor over weekend     HLD - Patient on Atorvastatin 80 mg daily     LE edema - Continue to monitor     Anemia - mild on admission labs at 11.6, continue to monitor    Hypokalemia - 3.1 on admission. Start K supplement, recheck on 5/18/20    Mood - Patient on Remeron 7.5 mg qHS at home.     Vitamin D deficiency - 22 on admission start 1000 U    Obesity, class II - BMI of 35.43 on admission. Dietitian to consult    GI - Start on Omeprazole.      DVT Ppx - Patient on Lovenox on transfer.    Total time:  26 minutes.  I spent greater than 50% of the time for patient care, counseling, and coordination on this date, including unit/floor time, and face-to-face time with the patient as per interval events and assessment and plan above. Topics discussed included discharge planning, ongoing elevated SBP, and ongoing swelling. Consider low dose lasix next week.     Ever Longo M.D.

## 2020-05-15 NOTE — DISCHARGE PLANNING
"Spoke w/ patient & daughter Gali on conference call to review IDT conference & targeted DC date 5.26.2020. Reviewed therapy notes: decreased endurance, anxiety & pain mgmt, stand // bars, not started gait trial, max assist for bed mobility, bathing, dressing, toileting, talkative during therapies deflecting activities. Verified patient has 4WW w/ seat at home for mobility. Daughter & JENARO staying w/ patient in her home. Has additional friends available to help out post acute. Patient able to stay on 1st floor at discharge w/ bedroom & full bath available. Discussed HH referral for ongoing therapies & RN, [facility] for intermittent care post acute. Daughter requests [facility]  referral. Requesting hospital bed. Reviewed Medicare guidelines for coverage hospital bed. Patient states \"I cannot lay flat in bed. I get too short of breath & have breathing problems.\" Will review w/ Dr. Longo. Reviewed one person for family contact, milind Talbert is point of contact & she will update other family members. Family can provide transportation. Inquired about baseline activity level prior to hospitalization w/ post polio LLE weakness. Patient states \"I am not able to be very active w/ walking because of the constant pain w/ my left & numbness.\" Reviewed patient will have ongoing recovery period post rehab. Questions answered. Emotional support provided.  "

## 2020-05-15 NOTE — PROGRESS NOTES
Patient c/o cough and requested prn medication. Dr. Campbell made aware - telephone orders obtained see mar.

## 2020-05-15 NOTE — THERAPY
"Occupational Therapy  Daily Treatment     Patient Name: Sena Boyce  Age:  79 y.o., Sex:  female  Medical Record #: [id number]  Today's Date: 5/15/2020     Precautions  Precautions: Fall Risk    Safety   ADL Safety : (P) Requires Supervision for Safety, Requires Physical Assist for Safety    Subjective    \"I can't do this. How am I to care for myself? God, why me? Why did you choose me to make this so hard?\" Pt stated through tears at the end of her shower.    \"When I'm sad I read my bible. I'm a , well I guess I can't do that anymore. 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 It is a multimillion dollar [employer] [facility]. I'm also still a .\"     Objective       05/15/20 0701   Safety    ADL Safety  Requires Supervision for Safety;Requires Physical Assist for Safety   Cognition    Orientation Level Oriented x 4   Level of Consciousness Alert   Sleep/Wake Cycle   Sleep & Rest Asleep  (awoken easily for morning therapy)   Active ROM Upper Body   Comments Limitations to RUE ROM.   Functional Level of Assist   Grooming Modified Independent   Grooming Description   (combing hair)   Bathing Minimal Assist  (CGA for standing)   Bathing Description Grab bar;Hand held shower;Long handled bath tool;Increased time;Supervision for safety;Verbal cueing  (education on use of long handled sponge)   Upper Body Dressing Supervision   Upper Body Dressing Description Verbal cueing   Lower Body Dressing Minimal Assist   Lower Body Dressing Description Verbal cueing;Reacher;Sock aid;Increased time  (Assist for R sock only; CGA when standing)   Toileting Minimal Assist   Toileting Description Grab bar;Increased time;Set-up of equipment;Supervision for safety;Verbal cueing   Bed, Chair, Wheelchair Transfer Moderate Assist  (Mod to max for supine to sit; CGA for sit to stand)   Bed Chair Wheelchair Transfer Description Increased " "time;Verbal cueing;Supervision for safety;Set-up of equipment   Toilet Transfers Contact Guard Assist   Toilet Transfer Description Grab bar;Set-up of equipment;Verbal cueing;Increased time   Tub / Shower Transfers Contact Guard Assist   Tub Shower Transfer Description Set-up of equipment;Supervision for safety;Verbal cueing;Grab bar   Balance   Sitting Balance (Static) Fair   Sitting Balance (Dynamic) Fair   Standing Balance (Static) Fair -   Standing Balance (Dynamic) Poor +   Interdisciplinary Plan of Care Collaboration   Patient Position at End of Therapy Seated;Self Releasing Lap Belt Applied;Call Light within Reach;Tray Table within Reach;Phone within Reach   OT Total Time Spent   OT Individual Total Time Spent (Mins) 60   OT Charge Group   OT Self Care / ADL 4     Educated pt on long handled sponge and the importance of using coping skills.    Pt refused compression socks again today.    Assessment    Pt with calm affect at start of session, but became tearful at end of session. Discussed with pt the improvements she has made even just in 3 days. Pt is self limiting at times. Requiring prompt of \"How would you do this if you were alone at home?\" Pt then able to complete tasks more independently.     Plan    ADLs, standing activity tolerance, functional mobility, endurance, balance (expected d/c date 5-)    Occupational Therapy Goals     Problem: Dressing     Dates: Start: 05/13/20       Goal: STG-Within one week, patient will dress UB     Dates: Start: 05/13/20       Description: 1) Individualized Goal:  with supervision  2) Interventions:  OT Group Therapy, OT Self Care/ADL, OT Community Reintegration, OT Manual Ther Technique, OT Neuro Re-Ed/Balance, OT Sensory Int Techniques, OT Therapeutic Activity, OT Evaluation, and OT Therapeutic Exercise      Note:     Goal Note filed on 05/14/20 1127 by Jeanette Figueroa MS,OTR/L    Eval completed 5/13                  Goal: STG-Within one week, patient will " dress LB     Dates: Start: 05/13/20       Description: 1) Individualized Goal:  with moderate assistance and AE  2) Interventions:  OT Group Therapy, OT Self Care/ADL, OT Community Reintegration, OT Manual Ther Technique, OT Neuro Re-Ed/Balance, OT Sensory Int Techniques, OT Therapeutic Activity, OT Evaluation, and OT Therapeutic Exercise      Note:     Goal Note filed on 05/14/20 1127 by Jeanette Figueroa, MS,OTR/L    Eval completed 5/13                        Problem: Functional Transfers     Dates: Start: 05/13/20       Goal: STG-Within one week, patient will transfer to toilet     Dates: Start: 05/13/20       Description: 1) Individualized Goal:  with min A  2) Interventions:  OT Group Therapy, OT Self Care/ADL, OT Community Reintegration, OT Manual Ther Technique, OT Neuro Re-Ed/Balance, OT Sensory Int Techniques, OT Therapeutic Activity, OT Evaluation, and OT Therapeutic Exercise      Note:     Goal Note filed on 05/14/20 1127 by Jeanette Figueroa, MS,OTR/L    Eval completed 5/13                        Problem: OT Long Term Goals     Dates: Start: 05/13/20       Goal: LTG-By discharge, patient will complete basic self care tasks     Dates: Start: 05/13/20       Description: 1) Individualized Goal:  with Supervision  2) Interventions:  OT Group Therapy, OT Self Care/ADL, OT Community Reintegration, OT Manual Ther Technique, OT Neuro Re-Ed/Balance, OT Sensory Int Techniques, OT Therapeutic Activity, OT Evaluation, and OT Therapeutic Exercise            Goal: LTG-By discharge, patient will perform bathroom transfers     Dates: Start: 05/13/20       Description: 1) Individualized Goal:  with supervision  2) Interventions:  OT Group Therapy, OT Self Care/ADL, OT Community Reintegration, OT Manual Ther Technique, OT Neuro Re-Ed/Balance, OT Sensory Int Techniques, OT Therapeutic Activity, OT Evaluation, and OT Therapeutic Exercise                  Problem: Toileting     Dates: Start: 05/13/20       Description: 1)  Individualized Goal:  with Min A  2) Interventions:  OT Group Therapy, OT Self Care/ADL, OT Community Reintegration, OT Manual Ther Technique, OT Neuro Re-Ed/Balance, OT Sensory Int Techniques, OT Therapeutic Activity, OT Evaluation, and OT Therapeutic Exercise      Goal: STG-Within one week, patient will complete toileting tasks     Dates: Start: 05/13/20       Description: 1) Individualized Goal:  with min A  2) Interventions:  OT Group Therapy, OT Self Care/ADL, OT Community Reintegration, OT Manual Ther Technique, OT Neuro Re-Ed/Balance, OT Sensory Int Techniques, OT Therapeutic Activity, OT Evaluation, and OT Therapeutic Exercise      Note:     Goal Note filed on 05/14/20 1127 by Jeanette Figueroa MS,OTR/L    Eval completed 5/13

## 2020-05-15 NOTE — CARE PLAN
Problem: Venous Thromboembolism (VTW)/Deep Vein Thrombosis (DVT) Prevention:  Goal: Patient will participate in Venous Thrombosis (VTE)/Deep Vein Thrombosis (DVT)Prevention Measures  Outcome: PROGRESSING AS EXPECTED     Problem: Bowel/Gastric:  Goal: Normal bowel function is maintained or improved  Outcome: PROGRESSING AS EXPECTED

## 2020-05-15 NOTE — THERAPY
"Occupational Therapy  Daily Treatment     Patient Name: Sena Boyce  Age:  79 y.o., Sex:  female  Medical Record #: [id number]  Today's Date: 5/15/2020     Precautions  Precautions: Fall Risk    Safety   ADL Safety : Requires Supervision for Safety, Requires Physical Assist for Safety    Subjective    \"I can't do it. How will I ever get home? I can't be a burden on my kids. It is my pride.\" pt stated with tears    \"I will read my Bible.\"     Objective       05/15/20 1031   Cognition    Orientation Level Oriented x 4   Level of Consciousness Alert   Sleep/Wake Cycle   Sleep & Rest Awake   Functional Level of Assist   Bed, Chair, Wheelchair Transfer Minimal Assist   Bed Chair Wheelchair Transfer Description Verbal cueing;Supervision for safety;Increased time  (from w/c to mat table)   Sitting Upper Body Exercises   Comments 4 lb weighted ball; antetior and posterior leans 2 sets of 10; chest press 1 set of 10    Balance   Sitting Balance (Static) Fair   Sitting Balance (Dynamic) Fair   Standing Balance (Static) Poor +   Standing Balance (Dynamic) Poor +   Interdisciplinary Plan of Care Collaboration   Patient Position at End of Therapy Seated;Self Releasing Lap Belt Applied;Tray Table within Reach;Phone within Reach;Family / Friend in Room   OT Total Time Spent   OT Individual Total Time Spent (Mins) 30   OT Charge Group   OT Therapeutic Exercise  2       Assessment    Pt with anxious affect and was tearful in session. Pt required encouragement and comparison from day 1 until now to see her progress. Discussed coping skills again with pt. Pt requiring rest breaks between sets. Limited endurance this session.    Plan    ADLs, standing activity tolerance, functional mobility, endurance, balance (expected d/c date 5-)    Occupational Therapy Goals     Problem: Dressing     Dates: Start: 05/13/20       Goal: STG-Within one week, patient will dress UB     Dates: Start: 05/13/20       Description: 1) Individualized " Goal:  with supervision  2) Interventions:  OT Group Therapy, OT Self Care/ADL, OT Community Reintegration, OT Manual Ther Technique, OT Neuro Re-Ed/Balance, OT Sensory Int Techniques, OT Therapeutic Activity, OT Evaluation, and OT Therapeutic Exercise      Note:     Goal Note filed on 05/14/20 1127 by Jeanette Figueroa MS,OTR/L    Eval completed 5/13                  Goal: STG-Within one week, patient will dress LB     Dates: Start: 05/13/20       Description: 1) Individualized Goal:  with moderate assistance and AE  2) Interventions:  OT Group Therapy, OT Self Care/ADL, OT Community Reintegration, OT Manual Ther Technique, OT Neuro Re-Ed/Balance, OT Sensory Int Techniques, OT Therapeutic Activity, OT Evaluation, and OT Therapeutic Exercise      Note:     Goal Note filed on 05/14/20 1127 by Jeanette Figueroa MS,OTR/L    Eval completed 5/13                        Problem: Functional Transfers     Dates: Start: 05/13/20       Goal: STG-Within one week, patient will transfer to toilet     Dates: Start: 05/13/20       Description: 1) Individualized Goal:  with min A  2) Interventions:  OT Group Therapy, OT Self Care/ADL, OT Community Reintegration, OT Manual Ther Technique, OT Neuro Re-Ed/Balance, OT Sensory Int Techniques, OT Therapeutic Activity, OT Evaluation, and OT Therapeutic Exercise      Note:     Goal Note filed on 05/14/20 1127 by Jeanette Figueroa MS,OTR/L    Eval completed 5/13                        Problem: OT Long Term Goals     Dates: Start: 05/13/20       Goal: LTG-By discharge, patient will complete basic self care tasks     Dates: Start: 05/13/20       Description: 1) Individualized Goal:  with Supervision  2) Interventions:  OT Group Therapy, OT Self Care/ADL, OT Community Reintegration, OT Manual Ther Technique, OT Neuro Re-Ed/Balance, OT Sensory Int Techniques, OT Therapeutic Activity, OT Evaluation, and OT Therapeutic Exercise            Goal: LTG-By discharge, patient will perform bathroom  transfers     Dates: Start: 05/13/20       Description: 1) Individualized Goal:  with supervision  2) Interventions:  OT Group Therapy, OT Self Care/ADL, OT Community Reintegration, OT Manual Ther Technique, OT Neuro Re-Ed/Balance, OT Sensory Int Techniques, OT Therapeutic Activity, OT Evaluation, and OT Therapeutic Exercise                  Problem: Toileting     Dates: Start: 05/13/20       Description: 1) Individualized Goal:  with Min A  2) Interventions:  OT Group Therapy, OT Self Care/ADL, OT Community Reintegration, OT Manual Ther Technique, OT Neuro Re-Ed/Balance, OT Sensory Int Techniques, OT Therapeutic Activity, OT Evaluation, and OT Therapeutic Exercise      Goal: STG-Within one week, patient will complete toileting tasks     Dates: Start: 05/13/20       Description: 1) Individualized Goal:  with min A  2) Interventions:  OT Group Therapy, OT Self Care/ADL, OT Community Reintegration, OT Manual Ther Technique, OT Neuro Re-Ed/Balance, OT Sensory Int Techniques, OT Therapeutic Activity, OT Evaluation, and OT Therapeutic Exercise      Note:     Goal Note filed on 05/14/20 1127 by Jeanette Figueroa MS,OTR/L    Alessandro completed 5/13

## 2020-05-15 NOTE — THERAPY
"Physical Therapy   Daily Treatment     Patient Name: Sena Boyce  Age:  79 y.o., Sex:  female  Medical Record #: [id number]  Today's Date: 5/15/2020     Precautions  Precautions: Fall Risk    Subjective    Pt agreeable to PT     Objective       05/15/20 0901   Sitting Lower Body Exercises   Other Exercises LE motomed x 15 minutes 1 short rest break at 5' total distance 1.04miles avg rpm 23 L1 resistance   Interdisciplinary Plan of Care Collaboration   Patient Position at End of Therapy Seated;Self Releasing Lap Belt Applied;Call Light within Reach   PT Total Time Spent   PT Individual Total Time Spent (Mins) 60   PT Charge Group   PT Therapeutic Exercise 1   PT Therapeutic Activities 1   Supervising Physical Therapist Sebastien       Assessment    Pt tolerated tx session well with emphasis on improved activity tolerance. Pt with difficultly propelling wc, may benefit from lower and 18\" wide chair to allow for paresh fpropulsion    Plan  Gait in // bars w/ vector system, LE strengthening, endurance w/c skills    Physical Therapy Problems     Problem: Balance     Dates: Start: 05/13/20       Goal: STG-Within one week, patient will maintain static standing     Dates: Start: 05/13/20       Description: 1) Individualized goal:  3 min in // bars with min A   2) Interventions:  PT Group Therapy, PT E Stim Attended, PT Gait Training, PT Therapeutic Exercises, PT Neuro Re-Ed/Balance, PT Therapeutic Activity, and PT Manual Therapy      Note:     Goal Note filed on 05/13/20 1608 by Barbara Michaud, PT    Alessandro completed 5/13                        Problem: Mobility     Dates: Start: 05/13/20       Description:       Goal: STG-Within one week, patient will propel wheelchair community     Dates: Start: 05/13/20       Description: 1) Individualized goal:  50 ft using UEs and/or LEs, SBA   2) Interventions: PT Group Therapy, PT E Stim Attended, PT Gait Training, PT Therapeutic Exercises, PT Neuro Re-Ed/Balance, PT Therapeutic Activity, " and PT Manual Therapy          Note:     Goal Note filed on 05/13/20 1608 by Barbara Michaud, PT    Eval completed 5/13                  Goal: STG-Within one week, patient will ambulate household distance     Dates: Start: 05/13/20       Description: 1) Individualized goal:  10 ft in // bars with mod A   2) Interventions: PT Group Therapy, PT E Stim Attended, PT Gait Training, PT Therapeutic Exercises, PT Neuro Re-Ed/Balance, PT Therapeutic Activity, and PT Manual Therapy          Note:     Goal Note filed on 05/13/20 1608 by Barbara Michaud, PT    Alessandro completed 5/13                        Problem: Mobility Transfers     Dates: Start: 05/13/20       Goal: STG-Within one week, patient will transfer bed to chair     Dates: Start: 05/13/20       Description: 1) Individualized goal:  mod A SPT or squat pivot with AD as needed   2) Interventions: PT Group Therapy, PT E Stim Attended, PT Gait Training, PT Therapeutic Exercises, PT Neuro Re-Ed/Balance, PT Therapeutic Activity, and PT Manual Therapy          Note:     Goal Note filed on 05/13/20 1608 by Barbara Michaud, PT    Alessandro completed 5/13                        Problem: PT-Long Term Goals     Dates: Start: 05/13/20       Goal: LTG-By discharge, patient will ambulate     Dates: Start: 05/13/20       Description: 150 ft with LRAD, spv           Goal: LTG-By discharge, patient will transfer one surface to another     Dates: Start: 05/13/20       Description: Spv with LRAD           Goal: LTG-By discharge, patient will ambulate up/down 4-6 stairs     Dates: Start: 05/13/20       Description: 1) Individualized goal:  2 steps with single HR and CGA   2) Interventions: PT Group Therapy, PT E Stim Attended, PT Gait Training, PT Therapeutic Exercises, PT Neuro Re-Ed/Balance, PT Therapeutic Activity, and PT Manual Therapy                Goal: LTG-By discharge, patient will transfer in/out of a car     Dates: Start: 05/13/20       Description: 1) Individualized goal:  Spv with LRAD   2)  Interventions: PT Group Therapy, PT E Stim Attended, PT Gait Training, PT Therapeutic Exercises, PT Neuro Re-Ed/Balance, PT Therapeutic Activity, and PT Manual Therapy

## 2020-05-15 NOTE — DOCUMENTATION QUERY
"                                                                         [facility]                                                                       Query Response Note      PATIENT:               HARSHAL CASILLAS  ACCT #:                  [id number]  MRN:                     [id number]  :                      1940  ADMIT DATE:       2020 3:10 PM  DISCH DATE:        2020 2:56 PM  RESPONDING  PROVIDER #:        [id number]           QUERY TEXT:    It is documented in the chart that patient spiked a fever on , in the Discharge summary it is documented \"likely secondary to MI.\" Patient has a history of myocardial infarction but no documentation of a current MI throughout chart. Can this diagnosis be clarified?    NOTE:  If an appropriate response is not listed below, please respond with a new note.       The patient's Clinical Indicators include:  Consult note -Chart review shows patient had T100.5 fever and undergoing work up.      Progress note -Fever  Assessment & Plan  Pt has spiked fever  Not sure about the source  Does not look toxic  No antibiotic at this time  Will do cxray  Blood cultures times 2  UA  Will cancel discharge for now    Coronary arteriograms:  Left main: normal  Left anterior descendin% diffuse tandem lesions, long segment proximal to distal LAD. Three major diagonal branches are noted.   Left circumflex: Dominant large left circumflex artery, supplies left posterolateral and posterior descending arteries. Large left posterolateral branch has 99% stenosis in proximal portion.  Right coronary: Small non-dominant RCA, chronically occluded in mid portion,    Discharge Summary-She had single mild spike a fever, likely secondary to MI, with negative infectious disease work-up, including COVID-19 test.  patient remains asymptomatic and stable for discharge per  Options provided:   -- Myocardial Infarction is ruled in   -- Myocardial Infarction is  ruled out   -- " Unable to determine      Query created by: Carey Gaxiola on 5/15/2020 1:18 PM    RESPONSE TEXT:    Myocardial Infarction is ruled in          Electronically signed by:  ARYAN DA SILVA MD 5/15/2020 2:01 PM

## 2020-05-15 NOTE — THERAPY
Physical Therapy   Daily Treatment     Patient Name: Sena Boyce  Age:  79 y.o., Sex:  female  Medical Record #: [id number]  Today's Date: 5/15/2020     Precautions  Precautions: Fall Risk    Subjective    Pt tearful at beginning of treatment session as she was worried that she would not be ready to go by her expected dc date 5/26. Words of encouragement and emotional support provided as well as education on importance of participation in therapy in order to achieve highest level of functional outcome    Pt c/o to reports B foot pain, which increases with weight bearing although she was able to tolerate gait training,  she describes the pain as tight and squeezing and states it was present before her last hospital admission, just not to this extent      Pt declining to wear monique hose despite edu    Objective       05/15/20 1501   Gait Functional Level of Assist    Gait Level Of Assist Minimal Assist  (min/CGA)   Assistive Device Front Wheel Walker   Distance (Feet)   (12' x 2 and 40' x 1 )   Deviation Decreased Toe Off;Decreased Heel Strike;Shuffled Gait  (poor step height, impoaired weight shifting, forward flexed )   Wheelchair Functional Level of Assist   Wheelchair Assist Total Assist   Sitting Lower Body Exercises   Sitting Lower Body Exercises Yes   Ankle Pumps 3 sets of 15   Hip Abduction 3 sets of 15   Hip Adduction 3 sets of 15   Long Arc Quad 3 sets of 15   Marching 3 sets of 15   Hamstring Curl 3 sets of 15   Bed Mobility    Sit to Stand Contact Guard Assist  (CGA/SBA)   Interdisciplinary Plan of Care Collaboration   IDT Collaboration with  Family / Caregiver   Patient Position at End of Therapy Seated;Self Releasing Lap Belt Applied;Tray Table within Reach;Call Light within Reach   Collaboration Comments daughter outside window at beginning of session   PT Total Time Spent   PT Individual Total Time Spent (Mins) 60   PT Charge Group   PT Gait Training 2   PT Therapeutic Exercise 2   Supervising Physical  Therapist Dayan Enciso       Assessment    Pt participated in gait training and LE exercises as indicated above. By the end of the treatment the patient was feeling more encouraged by her within session progress. Pt was able to amb with FWW at CGA/Regina level for up to 40' this session, which is a vast improvement from her previous distance of 15' in the parallel bars.      Plan      Gait training around mat table with FWW vs wc follow with FWW. Change pt to marie height wc to allow for self propulsion in the room, LE strength, standing tolerance, endurance.          Physical Therapy Problems     Problem: Balance     Dates: Start: 05/13/20       Goal: STG-Within one week, patient will maintain static standing     Dates: Start: 05/13/20       Description: 1) Individualized goal:  3 min in // bars with min A   2) Interventions:  PT Group Therapy, PT E Stim Attended, PT Gait Training, PT Therapeutic Exercises, PT Neuro Re-Ed/Balance, PT Therapeutic Activity, and PT Manual Therapy      Note:     Goal Note filed on 05/13/20 1608 by Barbara Michaud, PT    Alessandro completed 5/13                        Problem: Mobility     Dates: Start: 05/13/20       Description:       Goal: STG-Within one week, patient will propel wheelchair community     Dates: Start: 05/13/20       Description: 1) Individualized goal:  50 ft using UEs and/or LEs, SBA   2) Interventions: PT Group Therapy, PT E Stim Attended, PT Gait Training, PT Therapeutic Exercises, PT Neuro Re-Ed/Balance, PT Therapeutic Activity, and PT Manual Therapy          Note:     Goal Note filed on 05/13/20 1608 by Barbara Michaud, PT    Alessandro completed 5/13                  Goal: STG-Within one week, patient will ambulate household distance     Dates: Start: 05/13/20       Description: 1) Individualized goal:  10 ft in // bars with mod A   2) Interventions: PT Group Therapy, PT E Stim Attended, PT Gait Training, PT Therapeutic Exercises, PT Neuro Re-Ed/Balance, PT Therapeutic Activity, and PT  Manual Therapy          Note:     Goal Note filed on 05/13/20 1608 by Barbara Michaud, PT    Eval completed 5/13                        Problem: Mobility Transfers     Dates: Start: 05/13/20       Goal: STG-Within one week, patient will transfer bed to chair     Dates: Start: 05/13/20       Description: 1) Individualized goal:  mod A SPT or squat pivot with AD as needed   2) Interventions: PT Group Therapy, PT E Stim Attended, PT Gait Training, PT Therapeutic Exercises, PT Neuro Re-Ed/Balance, PT Therapeutic Activity, and PT Manual Therapy          Note:     Goal Note filed on 05/13/20 1608 by Barbara Michaud, PT    Eval completed 5/13                        Problem: PT-Long Term Goals     Dates: Start: 05/13/20       Goal: LTG-By discharge, patient will ambulate     Dates: Start: 05/13/20       Description: 150 ft with LRAD, spv           Goal: LTG-By discharge, patient will transfer one surface to another     Dates: Start: 05/13/20       Description: Spv with LRAD           Goal: LTG-By discharge, patient will ambulate up/down 4-6 stairs     Dates: Start: 05/13/20       Description: 1) Individualized goal:  2 steps with single HR and CGA   2) Interventions: PT Group Therapy, PT E Stim Attended, PT Gait Training, PT Therapeutic Exercises, PT Neuro Re-Ed/Balance, PT Therapeutic Activity, and PT Manual Therapy                Goal: LTG-By discharge, patient will transfer in/out of a car     Dates: Start: 05/13/20       Description: 1) Individualized goal:  Spv with LRAD   2) Interventions: PT Group Therapy, PT E Stim Attended, PT Gait Training, PT Therapeutic Exercises, PT Neuro Re-Ed/Balance, PT Therapeutic Activity, and PT Manual Therapy

## 2020-05-16 LAB
BACTERIA BLD CULT: NORMAL
BACTERIA BLD CULT: NORMAL
SIGNIFICANT IND 70042: NORMAL
SIGNIFICANT IND 70042: NORMAL
SITE SITE: NORMAL
SITE SITE: NORMAL
SOURCE SOURCE: NORMAL
SOURCE SOURCE: NORMAL

## 2020-05-16 PROCEDURE — A9270 NON-COVERED ITEM OR SERVICE: HCPCS | Performed by: PHYSICAL MEDICINE & REHABILITATION

## 2020-05-16 PROCEDURE — 770010 HCHG ROOM/CARE - REHAB SEMI PRIVAT*

## 2020-05-16 PROCEDURE — 97110 THERAPEUTIC EXERCISES: CPT

## 2020-05-16 PROCEDURE — 700102 HCHG RX REV CODE 250 W/ 637 OVERRIDE(OP): Performed by: PHYSICAL MEDICINE & REHABILITATION

## 2020-05-16 PROCEDURE — 97530 THERAPEUTIC ACTIVITIES: CPT

## 2020-05-16 PROCEDURE — 700111 HCHG RX REV CODE 636 W/ 250 OVERRIDE (IP): Performed by: PHYSICAL MEDICINE & REHABILITATION

## 2020-05-16 PROCEDURE — 97116 GAIT TRAINING THERAPY: CPT

## 2020-05-16 PROCEDURE — 97535 SELF CARE MNGMENT TRAINING: CPT

## 2020-05-16 RX ADMIN — LEVOTHYROXINE SODIUM 75 MCG: 75 TABLET ORAL at 05:16

## 2020-05-16 RX ADMIN — TICAGRELOR 90 MG: 90 TABLET ORAL at 20:32

## 2020-05-16 RX ADMIN — CARVEDILOL 12.5 MG: 12.5 TABLET, FILM COATED ORAL at 18:17

## 2020-05-16 RX ADMIN — LISINOPRIL 20 MG: 20 TABLET ORAL at 09:06

## 2020-05-16 RX ADMIN — OMEPRAZOLE 20 MG: 20 CAPSULE, DELAYED RELEASE ORAL at 09:06

## 2020-05-16 RX ADMIN — POTASSIUM CHLORIDE 20 MEQ: 1500 TABLET, EXTENDED RELEASE ORAL at 09:07

## 2020-05-16 RX ADMIN — ASPIRIN 81 MG: 81 TABLET, COATED ORAL at 09:06

## 2020-05-16 RX ADMIN — DOCUSATE SODIUM 50 MG AND SENNOSIDES 8.6 MG 2 TABLET: 8.6; 5 TABLET, FILM COATED ORAL at 09:00

## 2020-05-16 RX ADMIN — ENOXAPARIN SODIUM 40 MG: 100 INJECTION SUBCUTANEOUS at 09:07

## 2020-05-16 RX ADMIN — DOCUSATE SODIUM 50 MG AND SENNOSIDES 8.6 MG 2 TABLET: 8.6; 5 TABLET, FILM COATED ORAL at 20:32

## 2020-05-16 RX ADMIN — CARVEDILOL 12.5 MG: 12.5 TABLET, FILM COATED ORAL at 09:08

## 2020-05-16 RX ADMIN — ATORVASTATIN CALCIUM 80 MG: 40 TABLET, FILM COATED ORAL at 20:32

## 2020-05-16 RX ADMIN — MIRTAZAPINE 7.5 MG: 15 TABLET, ORALLY DISINTEGRATING ORAL at 20:33

## 2020-05-16 RX ADMIN — TICAGRELOR 90 MG: 90 TABLET ORAL at 09:06

## 2020-05-16 RX ADMIN — GUAIFENESIN 200 MG: 100 SOLUTION ORAL at 16:16

## 2020-05-16 RX ADMIN — AMLODIPINE BESYLATE 10 MG: 5 TABLET ORAL at 09:06

## 2020-05-16 ASSESSMENT — GAIT ASSESSMENTS
ASSISTIVE DEVICE: FRONT WHEEL WALKER
DEVIATION: ANTALGIC;STEP TO;BRADYKINETIC;DECREASED HEEL STRIKE;DECREASED TOE OFF
GAIT LEVEL OF ASSIST: CONTACT GUARD ASSIST
DISTANCE (FEET): 25

## 2020-05-16 ASSESSMENT — ACTIVITIES OF DAILY LIVING (ADL): BED_CHAIR_WHEELCHAIR_TRANSFER_DESCRIPTION: ADAPTIVE EQUIPMENT;INCREASED TIME

## 2020-05-16 NOTE — THERAPY
Physical Therapy   Daily Treatment     Patient Name: Sena Boyce  Age:  79 y.o., Sex:  female  Medical Record #: [id number]  Today's Date: 5/16/2020     Precautions  Precautions: Fall Risk    Subjective    Pt reports she is agreeable to trying to walk but her foot hurts more today than yesterday     Objective       05/16/20 0931   Gait Functional Level of Assist    Gait Level Of Assist Contact Guard Assist   Assistive Device Front Wheel Walker   Distance (Feet) 25  (around therapy mat)   # of Times Distance was Traveled 1   Deviation Antalgic;Step To;Bradykinetic;Decreased Heel Strike;Decreased Toe Off   Wheelchair Functional Level of Assist   Wheelchair Assist Supervised   Distance Wheelchair (Feet or Distance) 150   Wheelchair Description Extra time;Verbal cueing;Adaptive equipment;Safety concerns;Supervision for safety   Sitting Lower Body Exercises   Ankle Pumps 3 sets of 15;Bilateral   Long Arc Quad 3 sets of 15;Bilateral   Marching 3 sets of 15;Reciprocal   Sit to Stand 1 set of 15   Other Exercises VCs and demo for form adn tehcnique   Interdisciplinary Plan of Care Collaboration   Patient Position at End of Therapy Seated;Call Light within Reach;Tray Table within Reach;Self Releasing Lap Belt Applied;Chair Alarm On   PT Total Time Spent   PT Individual Total Time Spent (Mins) 60   PT Charge Group   PT Gait Training 1   PT Therapeutic Exercise 2   PT Therapeutic Activities 1       Assessment    Pt cont to be limited with mobility by L foot pain presumably dt excess fluid and swelling. Pt performs exercises well with cues and able to stand at CGA - min A with practice.    Plan    Gait training around mat table with FWW vs wc follow with FWW,  LE strength, standing tolerance, endurance, pain relief L ankle    Physical Therapy Problems     Problem: Balance     Dates: Start: 05/13/20       Goal: STG-Within one week, patient will maintain static standing     Dates: Start: 05/13/20       Description: 1)  Individualized goal:  3 min in // bars with min A   2) Interventions:  PT Group Therapy, PT E Stim Attended, PT Gait Training, PT Therapeutic Exercises, PT Neuro Re-Ed/Balance, PT Therapeutic Activity, and PT Manual Therapy      Note:     Goal Note filed on 05/13/20 1608 by Barbara Michaud, PT    Franciaal completed 5/13                        Problem: Mobility     Dates: Start: 05/13/20       Description:       Goal: STG-Within one week, patient will propel wheelchair community     Dates: Start: 05/13/20       Description: 1) Individualized goal:  50 ft using UEs and/or LEs, SBA   2) Interventions: PT Group Therapy, PT E Stim Attended, PT Gait Training, PT Therapeutic Exercises, PT Neuro Re-Ed/Balance, PT Therapeutic Activity, and PT Manual Therapy          Note:     Goal Note filed on 05/13/20 1608 by Barbara Michaud, PT    Franciaal completed 5/13                  Goal: STG-Within one week, patient will ambulate household distance     Dates: Start: 05/13/20       Description: 1) Individualized goal:  10 ft in // bars with mod A   2) Interventions: PT Group Therapy, PT E Stim Attended, PT Gait Training, PT Therapeutic Exercises, PT Neuro Re-Ed/Balance, PT Therapeutic Activity, and PT Manual Therapy          Note:     Goal Note filed on 05/13/20 1608 by Barbara Michaud, PT    Alessandro completed 5/13                        Problem: Mobility Transfers     Dates: Start: 05/13/20       Goal: STG-Within one week, patient will transfer bed to chair     Dates: Start: 05/13/20       Description: 1) Individualized goal:  mod A SPT or squat pivot with AD as needed   2) Interventions: PT Group Therapy, PT E Stim Attended, PT Gait Training, PT Therapeutic Exercises, PT Neuro Re-Ed/Balance, PT Therapeutic Activity, and PT Manual Therapy          Note:     Goal Note filed on 05/13/20 1608 by Barbara Michaud, PT    Alessandro completed 5/13                        Problem: PT-Long Term Goals     Dates: Start: 05/13/20       Goal: LTG-By discharge, patient will ambulate      Dates: Start: 05/13/20       Description: 150 ft with LRAD, spv           Goal: LTG-By discharge, patient will transfer one surface to another     Dates: Start: 05/13/20       Description: Spv with LRAD           Goal: LTG-By discharge, patient will ambulate up/down 4-6 stairs     Dates: Start: 05/13/20       Description: 1) Individualized goal:  2 steps with single HR and CGA   2) Interventions: PT Group Therapy, PT E Stim Attended, PT Gait Training, PT Therapeutic Exercises, PT Neuro Re-Ed/Balance, PT Therapeutic Activity, and PT Manual Therapy                Goal: LTG-By discharge, patient will transfer in/out of a car     Dates: Start: 05/13/20       Description: 1) Individualized goal:  Spv with LRAD   2) Interventions: PT Group Therapy, PT E Stim Attended, PT Gait Training, PT Therapeutic Exercises, PT Neuro Re-Ed/Balance, PT Therapeutic Activity, and PT Manual Therapy

## 2020-05-16 NOTE — THERAPY
"Occupational Therapy  Daily Treatment     Patient Name: Sena Boyce  Age:  79 y.o., Sex:  female  Medical Record #: [id number]  Today's Date: 5/16/2020     Precautions  Precautions: Fall Risk    Safety   ADL Safety : (P) Requires Supervision for Safety, Requires Physical Assist for Safety    Subjective    \"My daughter wanted me to tell you that you all need to focus on making me not max assist.. whatever that means?\"     Objective       05/16/20 0831   Safety    ADL Safety  Requires Supervision for Safety;Requires Physical Assist for Safety   Pain 0 - 10 Group   Location Shoulder   Location Orientation Right   Non Verbal Descriptors   Non Verbal Scale  Grimacing   Cognition    Orientation Level Oriented x 4   Sleep/Wake Cycle   Sleep & Rest Awake   Sitting Upper Body Exercises   Upper Extremity Bike Minutes / Rest Breaks (See Comments)  (Level 1 Fluidobike;10 minutes; BUE, however, gave RUE breaks)   Other Exercise rickshaw; 20 lbs; 1 set of 10   OT Total Time Spent   OT Individual Total Time Spent (Mins) 30   OT Charge Group   OT Therapeutic Exercise  2       Assessment    Pt still anxious this session, but not tearful. Pt with c/o shoulder pain throughout session. Pt feels it is from her recent stenting, however, upon further questions she recalled she injured her shoulder years ago. Pt did better with endurance this date.    Plan    ADLs, standing activity tolerance, functional mobility, endurance, balance (expected d/c date 5-)    Occupational Therapy Goals     Problem: Dressing     Dates: Start: 05/13/20       Goal: STG-Within one week, patient will dress UB     Dates: Start: 05/13/20       Description: 1) Individualized Goal:  with supervision  2) Interventions:  OT Group Therapy, OT Self Care/ADL, OT Community Reintegration, OT Manual Ther Technique, OT Neuro Re-Ed/Balance, OT Sensory Int Techniques, OT Therapeutic Activity, OT Evaluation, and OT Therapeutic Exercise      Note:     Goal Note filed on " 05/14/20 1127 by Jeanette Figueroa, MS,OTR/L    Eval completed 5/13                  Goal: STG-Within one week, patient will dress LB     Dates: Start: 05/13/20       Description: 1) Individualized Goal:  with moderate assistance and AE  2) Interventions:  OT Group Therapy, OT Self Care/ADL, OT Community Reintegration, OT Manual Ther Technique, OT Neuro Re-Ed/Balance, OT Sensory Int Techniques, OT Therapeutic Activity, OT Evaluation, and OT Therapeutic Exercise      Note:     Goal Note filed on 05/14/20 1127 by Jeanette Figueroa, MS,OTR/L    Eval completed 5/13                        Problem: Functional Transfers     Dates: Start: 05/13/20       Goal: STG-Within one week, patient will transfer to toilet     Dates: Start: 05/13/20       Description: 1) Individualized Goal:  with min A  2) Interventions:  OT Group Therapy, OT Self Care/ADL, OT Community Reintegration, OT Manual Ther Technique, OT Neuro Re-Ed/Balance, OT Sensory Int Techniques, OT Therapeutic Activity, OT Evaluation, and OT Therapeutic Exercise      Note:     Goal Note filed on 05/14/20 1127 by Jeanette Figueroa MS,OTR/L    Eval completed 5/13                        Problem: OT Long Term Goals     Dates: Start: 05/13/20       Goal: LTG-By discharge, patient will complete basic self care tasks     Dates: Start: 05/13/20       Description: 1) Individualized Goal:  with Supervision  2) Interventions:  OT Group Therapy, OT Self Care/ADL, OT Community Reintegration, OT Manual Ther Technique, OT Neuro Re-Ed/Balance, OT Sensory Int Techniques, OT Therapeutic Activity, OT Evaluation, and OT Therapeutic Exercise            Goal: LTG-By discharge, patient will perform bathroom transfers     Dates: Start: 05/13/20       Description: 1) Individualized Goal:  with supervision  2) Interventions:  OT Group Therapy, OT Self Care/ADL, OT Community Reintegration, OT Manual Ther Technique, OT Neuro Re-Ed/Balance, OT Sensory Int Techniques, OT Therapeutic Activity, OT  Evaluation, and OT Therapeutic Exercise                  Problem: Toileting     Dates: Start: 05/13/20       Description: 1) Individualized Goal:  with Min A  2) Interventions:  OT Group Therapy, OT Self Care/ADL, OT Community Reintegration, OT Manual Ther Technique, OT Neuro Re-Ed/Balance, OT Sensory Int Techniques, OT Therapeutic Activity, OT Evaluation, and OT Therapeutic Exercise      Goal: STG-Within one week, patient will complete toileting tasks     Dates: Start: 05/13/20       Description: 1) Individualized Goal:  with min A  2) Interventions:  OT Group Therapy, OT Self Care/ADL, OT Community Reintegration, OT Manual Ther Technique, OT Neuro Re-Ed/Balance, OT Sensory Int Techniques, OT Therapeutic Activity, OT Evaluation, and OT Therapeutic Exercise      Note:     Goal Note filed on 05/14/20 1127 by Jeanette Figueroa MS,OTR/L    Alessandro completed 5/13

## 2020-05-16 NOTE — CARE PLAN
Problem: Communication  Goal: The ability to communicate needs accurately and effectively will improve  Outcome: PROGRESSING AS EXPECTED  Patient is alert and oriented x 4.      Problem: Safety  Goal: Will remain free from injury  Outcome: PROGRESSING AS EXPECTED  Pt uses call light consistently and appropriately. Waits for assistance does not attempt self transfer this shift. Able to verbalize needs.      Problem: Discharge Barriers/Planning  Goal: Patient's continuum of care needs will be met  Outcome: PROGRESSING AS EXPECTED  Bilateral lower extremities present with 1+ edema.  Elevated in bed.  Patient states she is usually on a diuretic at home.

## 2020-05-16 NOTE — THERAPY
Physical Therapy   Daily Treatment     Patient Name: Sena Boyce  Age:  79 y.o., Sex:  female  Medical Record #: [id number]  Today's Date: 5/16/2020     Precautions  Precautions: Fall Risk    Subjective    Pt reports she is agreeable to exercise     Objective       05/16/20 1501   Sitting Lower Body Exercises   Nustep Resistance Level 3  (10 min, VCs for pacing, 452 steps, 45 SPM)   Interdisciplinary Plan of Care Collaboration   Patient Position at End of Therapy Seated;Call Light within Reach;Tray Table within Reach;Self Releasing Lap Belt Applied;Chair Alarm On   PT Total Time Spent   PT Individual Total Time Spent (Mins) 30   PT Charge Group   PT Therapeutic Exercise 1   PT Therapeutic Activities 1       SPT practice no AD - required min A dt pain in LLE      Assessment    Pt demos improving activity tolerance - no breaks with exercise this session - but cont to be limited with mobility dt pain in L foot    Plan    Gait training around mat table with FWW vs wc follow with FWW,  LE strength, standing tolerance, endurance, pain relief L ankle    Physical Therapy Problems     Problem: Balance     Dates: Start: 05/13/20       Goal: STG-Within one week, patient will maintain static standing     Dates: Start: 05/13/20       Description: 1) Individualized goal:  3 min in // bars with min A   2) Interventions:  PT Group Therapy, PT E Stim Attended, PT Gait Training, PT Therapeutic Exercises, PT Neuro Re-Ed/Balance, PT Therapeutic Activity, and PT Manual Therapy      Note:     Goal Note filed on 05/13/20 1608 by Barbara Michaud, PT    Alessandro completed 5/13                        Problem: Mobility     Dates: Start: 05/13/20       Description:       Goal: STG-Within one week, patient will propel wheelchair community     Dates: Start: 05/13/20       Description: 1) Individualized goal:  50 ft using UEs and/or LEs, SBA   2) Interventions: PT Group Therapy, PT E Stim Attended, PT Gait Training, PT Therapeutic Exercises, PT Neuro  Re-Ed/Balance, PT Therapeutic Activity, and PT Manual Therapy          Note:     Goal Note filed on 05/13/20 1608 by Barbara Michaud, PT    Eval completed 5/13                  Goal: STG-Within one week, patient will ambulate household distance     Dates: Start: 05/13/20       Description: 1) Individualized goal:  10 ft in // bars with mod A   2) Interventions: PT Group Therapy, PT E Stim Attended, PT Gait Training, PT Therapeutic Exercises, PT Neuro Re-Ed/Balance, PT Therapeutic Activity, and PT Manual Therapy          Note:     Goal Note filed on 05/13/20 1608 by Barbara Michaud, PT    Eval completed 5/13                        Problem: Mobility Transfers     Dates: Start: 05/13/20       Goal: STG-Within one week, patient will transfer bed to chair     Dates: Start: 05/13/20       Description: 1) Individualized goal:  mod A SPT or squat pivot with AD as needed   2) Interventions: PT Group Therapy, PT E Stim Attended, PT Gait Training, PT Therapeutic Exercises, PT Neuro Re-Ed/Balance, PT Therapeutic Activity, and PT Manual Therapy          Note:     Goal Note filed on 05/13/20 1608 by Barbara Michaud, PT    Eval completed 5/13                        Problem: PT-Long Term Goals     Dates: Start: 05/13/20       Goal: LTG-By discharge, patient will ambulate     Dates: Start: 05/13/20       Description: 150 ft with LRAD, spv           Goal: LTG-By discharge, patient will transfer one surface to another     Dates: Start: 05/13/20       Description: Spv with LRAD           Goal: LTG-By discharge, patient will ambulate up/down 4-6 stairs     Dates: Start: 05/13/20       Description: 1) Individualized goal:  2 steps with single HR and CGA   2) Interventions: PT Group Therapy, PT E Stim Attended, PT Gait Training, PT Therapeutic Exercises, PT Neuro Re-Ed/Balance, PT Therapeutic Activity, and PT Manual Therapy                Goal: LTG-By discharge, patient will transfer in/out of a car     Dates: Start: 05/13/20       Description: 1)  Individualized goal:  Spv with LRAD   2) Interventions: PT Group Therapy, PT E Stim Attended, PT Gait Training, PT Therapeutic Exercises, PT Neuro Re-Ed/Balance, PT Therapeutic Activity, and PT Manual Therapy

## 2020-05-16 NOTE — CARE PLAN
"  Problem: Psychosocial Needs:  Goal: Level of anxiety will decrease  Outcome: PROGRESSING SLOWER THAN EXPECTED  Has multiple visitors all day long outside her window on the phone. Many times patient is crying \"I don't want to go to a nursing home. I won't go!\" Reassurance provided that she is making progress everyday here and she has only been here 3 days. The team has not had a first conference to determine length of stay. Needs encouragement.     "

## 2020-05-17 PROCEDURE — 770010 HCHG ROOM/CARE - REHAB SEMI PRIVAT*

## 2020-05-17 PROCEDURE — A9270 NON-COVERED ITEM OR SERVICE: HCPCS | Performed by: PHYSICAL MEDICINE & REHABILITATION

## 2020-05-17 PROCEDURE — 700102 HCHG RX REV CODE 250 W/ 637 OVERRIDE(OP): Performed by: PHYSICAL MEDICINE & REHABILITATION

## 2020-05-17 PROCEDURE — 700111 HCHG RX REV CODE 636 W/ 250 OVERRIDE (IP): Performed by: PHYSICAL MEDICINE & REHABILITATION

## 2020-05-17 RX ADMIN — MIRTAZAPINE 7.5 MG: 15 TABLET, ORALLY DISINTEGRATING ORAL at 20:49

## 2020-05-17 RX ADMIN — NYSTATIN: 100000 POWDER TOPICAL at 20:54

## 2020-05-17 RX ADMIN — ENOXAPARIN SODIUM 40 MG: 100 INJECTION SUBCUTANEOUS at 08:32

## 2020-05-17 RX ADMIN — LISINOPRIL 20 MG: 20 TABLET ORAL at 08:31

## 2020-05-17 RX ADMIN — ASPIRIN 81 MG: 81 TABLET, COATED ORAL at 08:31

## 2020-05-17 RX ADMIN — TICAGRELOR 90 MG: 90 TABLET ORAL at 08:31

## 2020-05-17 RX ADMIN — LEVOTHYROXINE SODIUM 75 MCG: 75 TABLET ORAL at 05:49

## 2020-05-17 RX ADMIN — GUAIFENESIN 200 MG: 100 SOLUTION ORAL at 18:20

## 2020-05-17 RX ADMIN — OMEPRAZOLE 20 MG: 20 CAPSULE, DELAYED RELEASE ORAL at 08:31

## 2020-05-17 RX ADMIN — GUAIFENESIN 200 MG: 100 SOLUTION ORAL at 08:39

## 2020-05-17 RX ADMIN — BENZOCAINE AND MENTHOL 1 LOZENGE: 15; 3.6 LOZENGE ORAL at 18:20

## 2020-05-17 RX ADMIN — TICAGRELOR 90 MG: 90 TABLET ORAL at 20:50

## 2020-05-17 RX ADMIN — ATORVASTATIN CALCIUM 80 MG: 40 TABLET, FILM COATED ORAL at 20:50

## 2020-05-17 RX ADMIN — AMLODIPINE BESYLATE 10 MG: 5 TABLET ORAL at 08:31

## 2020-05-17 RX ADMIN — POTASSIUM CHLORIDE 20 MEQ: 1500 TABLET, EXTENDED RELEASE ORAL at 08:31

## 2020-05-17 RX ADMIN — DOCUSATE SODIUM 50 MG AND SENNOSIDES 8.6 MG 2 TABLET: 8.6; 5 TABLET, FILM COATED ORAL at 08:31

## 2020-05-17 RX ADMIN — DOCUSATE SODIUM 50 MG AND SENNOSIDES 8.6 MG 2 TABLET: 8.6; 5 TABLET, FILM COATED ORAL at 20:50

## 2020-05-17 RX ADMIN — CARVEDILOL 12.5 MG: 12.5 TABLET, FILM COATED ORAL at 08:32

## 2020-05-17 RX ADMIN — GUAIFENESIN 200 MG: 100 SOLUTION ORAL at 22:43

## 2020-05-17 RX ADMIN — CARVEDILOL 12.5 MG: 12.5 TABLET, FILM COATED ORAL at 17:25

## 2020-05-17 ASSESSMENT — PATIENT HEALTH QUESTIONNAIRE - PHQ9
2. FEELING DOWN, DEPRESSED, IRRITABLE, OR HOPELESS: NOT AT ALL
1. LITTLE INTEREST OR PLEASURE IN DOING THINGS: NOT AT ALL
SUM OF ALL RESPONSES TO PHQ9 QUESTIONS 1 AND 2: 0

## 2020-05-17 ASSESSMENT — FIBROSIS 4 INDEX: FIB4 SCORE: 2.14

## 2020-05-17 NOTE — CARE PLAN
Problem: Bowel/Gastric:  Goal: Normal bowel function is maintained or improved  Outcome: PROGRESSING AS EXPECTED  Bowel sounds present in all 4 quadrants.

## 2020-05-17 NOTE — CARE PLAN
Problem: Communication  Goal: The ability to communicate needs accurately and effectively will improve  Description: Patient able to verbalize needs.  Will continue to monitor.   Outcome: PROGRESSING AS EXPECTED     Problem: Safety  Goal: Will remain free from injury  Description: Pt uses call light consistently and appropriately. Waits for assistance does not attempt self transfer this shift. Able to verbalize needs.   Outcome: PROGRESSING AS EXPECTED  Goal: Will remain free from falls  Outcome: PROGRESSING AS EXPECTED

## 2020-05-17 NOTE — CARE PLAN
Problem: Bowel/Gastric:  Goal: Normal bowel function is maintained or improved  Outcome: PROGRESSING AS EXPECTED   Bowel sounds are normoactive in all quadrants.

## 2020-05-18 ENCOUNTER — APPOINTMENT (OUTPATIENT)
Dept: RADIOLOGY | Facility: REHABILITATION | Age: 80
DRG: 950 | End: 2020-05-18
Attending: PHYSICAL MEDICINE & REHABILITATION
Payer: MEDICARE

## 2020-05-18 LAB
ANION GAP SERPL CALC-SCNC: 13 MMOL/L (ref 7–16)
BUN SERPL-MCNC: 17 MG/DL (ref 8–22)
CALCIUM SERPL-MCNC: 8.8 MG/DL (ref 8.5–10.5)
CHLORIDE SERPL-SCNC: 105 MMOL/L (ref 96–112)
CO2 SERPL-SCNC: 21 MMOL/L (ref 20–33)
CREAT SERPL-MCNC: 0.81 MG/DL (ref 0.5–1.4)
GLUCOSE SERPL-MCNC: 109 MG/DL (ref 65–99)
POTASSIUM SERPL-SCNC: 3.5 MMOL/L (ref 3.6–5.5)
SODIUM SERPL-SCNC: 139 MMOL/L (ref 135–145)

## 2020-05-18 PROCEDURE — 97535 SELF CARE MNGMENT TRAINING: CPT | Mod: CO

## 2020-05-18 PROCEDURE — 71045 X-RAY EXAM CHEST 1 VIEW: CPT

## 2020-05-18 PROCEDURE — 700111 HCHG RX REV CODE 636 W/ 250 OVERRIDE (IP): Performed by: PHYSICAL MEDICINE & REHABILITATION

## 2020-05-18 PROCEDURE — 97110 THERAPEUTIC EXERCISES: CPT | Mod: CO

## 2020-05-18 PROCEDURE — 80048 BASIC METABOLIC PNL TOTAL CA: CPT

## 2020-05-18 PROCEDURE — 770010 HCHG ROOM/CARE - REHAB SEMI PRIVAT*

## 2020-05-18 PROCEDURE — 97116 GAIT TRAINING THERAPY: CPT

## 2020-05-18 PROCEDURE — 97110 THERAPEUTIC EXERCISES: CPT

## 2020-05-18 PROCEDURE — 36415 COLL VENOUS BLD VENIPUNCTURE: CPT

## 2020-05-18 PROCEDURE — A9270 NON-COVERED ITEM OR SERVICE: HCPCS | Performed by: PHYSICAL MEDICINE & REHABILITATION

## 2020-05-18 PROCEDURE — 700102 HCHG RX REV CODE 250 W/ 637 OVERRIDE(OP): Performed by: PHYSICAL MEDICINE & REHABILITATION

## 2020-05-18 PROCEDURE — 97530 THERAPEUTIC ACTIVITIES: CPT

## 2020-05-18 PROCEDURE — 99232 SBSQ HOSP IP/OBS MODERATE 35: CPT | Performed by: PHYSICAL MEDICINE & REHABILITATION

## 2020-05-18 RX ORDER — LISINOPRIL 20 MG/1
40 TABLET ORAL DAILY
Status: DISCONTINUED | OUTPATIENT
Start: 2020-05-19 | End: 2020-05-19

## 2020-05-18 RX ORDER — GUAIFENESIN 600 MG/1
600 TABLET, EXTENDED RELEASE ORAL EVERY 12 HOURS
Status: DISCONTINUED | OUTPATIENT
Start: 2020-05-18 | End: 2020-05-22

## 2020-05-18 RX ADMIN — AMLODIPINE BESYLATE 10 MG: 5 TABLET ORAL at 08:21

## 2020-05-18 RX ADMIN — LISINOPRIL 20 MG: 20 TABLET ORAL at 08:21

## 2020-05-18 RX ADMIN — TICAGRELOR 90 MG: 90 TABLET ORAL at 08:32

## 2020-05-18 RX ADMIN — LEVOTHYROXINE SODIUM 75 MCG: 75 TABLET ORAL at 05:17

## 2020-05-18 RX ADMIN — CARVEDILOL 12.5 MG: 12.5 TABLET, FILM COATED ORAL at 17:45

## 2020-05-18 RX ADMIN — GUAIFENESIN 600 MG: 600 TABLET, EXTENDED RELEASE ORAL at 12:03

## 2020-05-18 RX ADMIN — DOCUSATE SODIUM 50 MG AND SENNOSIDES 8.6 MG 2 TABLET: 8.6; 5 TABLET, FILM COATED ORAL at 09:00

## 2020-05-18 RX ADMIN — TICAGRELOR 90 MG: 90 TABLET ORAL at 21:02

## 2020-05-18 RX ADMIN — GUAIFENESIN 200 MG: 100 SOLUTION ORAL at 08:32

## 2020-05-18 RX ADMIN — POTASSIUM CHLORIDE 20 MEQ: 1500 TABLET, EXTENDED RELEASE ORAL at 08:22

## 2020-05-18 RX ADMIN — ENOXAPARIN SODIUM 40 MG: 100 INJECTION SUBCUTANEOUS at 08:22

## 2020-05-18 RX ADMIN — OMEPRAZOLE 20 MG: 20 CAPSULE, DELAYED RELEASE ORAL at 08:21

## 2020-05-18 RX ADMIN — MIRTAZAPINE 7.5 MG: 15 TABLET, ORALLY DISINTEGRATING ORAL at 21:02

## 2020-05-18 RX ADMIN — NYSTATIN: 100000 POWDER TOPICAL at 21:11

## 2020-05-18 RX ADMIN — ATORVASTATIN CALCIUM 80 MG: 40 TABLET, FILM COATED ORAL at 21:03

## 2020-05-18 RX ADMIN — GUAIFENESIN 600 MG: 600 TABLET, EXTENDED RELEASE ORAL at 21:02

## 2020-05-18 RX ADMIN — ASPIRIN 81 MG: 81 TABLET, COATED ORAL at 08:22

## 2020-05-18 RX ADMIN — BENZOCAINE AND MENTHOL 1 LOZENGE: 15; 3.6 LOZENGE ORAL at 08:32

## 2020-05-18 RX ADMIN — CARVEDILOL 12.5 MG: 12.5 TABLET, FILM COATED ORAL at 08:21

## 2020-05-18 ASSESSMENT — ACTIVITIES OF DAILY LIVING (ADL)
TUB_SHOWER_TRANSFER_DESCRIPTION: GRAB BAR
TOILET_TRANSFER_DESCRIPTION: GRAB BAR
BED_CHAIR_WHEELCHAIR_TRANSFER_DESCRIPTION: ADAPTIVE EQUIPMENT

## 2020-05-18 ASSESSMENT — PATIENT HEALTH QUESTIONNAIRE - PHQ9
SUM OF ALL RESPONSES TO PHQ9 QUESTIONS 1 AND 2: 0
SUM OF ALL RESPONSES TO PHQ9 QUESTIONS 1 AND 2: 0
2. FEELING DOWN, DEPRESSED, IRRITABLE, OR HOPELESS: NOT AT ALL
1. LITTLE INTEREST OR PLEASURE IN DOING THINGS: NOT AT ALL
1. LITTLE INTEREST OR PLEASURE IN DOING THINGS: NOT AT ALL
2. FEELING DOWN, DEPRESSED, IRRITABLE, OR HOPELESS: NOT AT ALL

## 2020-05-18 ASSESSMENT — GAIT ASSESSMENTS
DISTANCE (FEET): 55
GAIT LEVEL OF ASSIST: CONTACT GUARD ASSIST
DEVIATION: BRADYKINETIC;ANTALGIC;DECREASED HEEL STRIKE;DECREASED TOE OFF
DEVIATION: BRADYKINETIC;DECREASED HEEL STRIKE;DECREASED TOE OFF
GAIT LEVEL OF ASSIST: CONTACT GUARD ASSIST
ASSISTIVE DEVICE: FRONT WHEEL WALKER
ASSISTIVE DEVICE: FRONT WHEEL WALKER

## 2020-05-18 NOTE — THERAPY
Physical Therapy   Daily Treatment     Patient Name: Sena Boyce  Age:  79 y.o., Sex:  female  Medical Record #: [id number]  Today's Date: 5/18/2020     Precautions  Precautions: (P) Fall Risk    Subjective    Pt was sitting in w/c upon arrival and agreeable to tx but very fatigued. Requested to go to bed at end of session.      Objective       05/18/20 1331   Precautions   Precautions Fall Risk   Gait Functional Level of Assist    Gait Level Of Assist Contact Guard Assist   Assistive Device Front Wheel Walker   Distance (Feet) 55   # of Times Distance was Traveled 1   Deviation Bradykinetic;Decreased Heel Strike;Decreased Toe Off   Transfer Functional Level of Assist   Bed, Chair, Wheelchair Transfer Stand by Assist   Bed Chair Wheelchair Transfer Description Adaptive equipment  (SBA FWW SPT)   Bed Mobility    Sit to Supine Minimal Assist  (with leg  and bed rail)   Interdisciplinary Plan of Care Collaboration   Patient Position at End of Therapy In Bed;Call Light within Reach   PT Total Time Spent   PT Individual Total Time Spent (Mins) 30   PT Charge Group   PT Therapeutic Activities 2     Education on encouraging time out of bed for improved respiratory capacity and on bed positioning    Assessment    Pt primarily limited by cardiovascular endurance but with good motivation this session    Plan    Continue gait training with FWW, overall strength and conditioning, standing tolerance/ balance.        Physical Therapy Problems     Problem: Balance     Dates: Start: 05/13/20       Goal: STG-Within one week, patient will maintain static standing     Dates: Start: 05/13/20       Description: 1) Individualized goal:  3 min in // bars with min A   2) Interventions:  PT Group Therapy, PT E Stim Attended, PT Gait Training, PT Therapeutic Exercises, PT Neuro Re-Ed/Balance, PT Therapeutic Activity, and PT Manual Therapy      Note:     Goal Note filed on 05/13/20 1178 by Barbara Michaud, PT    Alessandro completed 5/13                         Problem: Mobility     Dates: Start: 05/13/20       Description:       Goal: STG-Within one week, patient will propel wheelchair community     Dates: Start: 05/13/20       Description: 1) Individualized goal:  50 ft using UEs and/or LEs, SBA   2) Interventions: PT Group Therapy, PT E Stim Attended, PT Gait Training, PT Therapeutic Exercises, PT Neuro Re-Ed/Balance, PT Therapeutic Activity, and PT Manual Therapy          Note:     Goal Note filed on 05/13/20 1608 by Barbara Michaud, PT    Eval completed 5/13                  Goal: STG-Within one week, patient will ambulate household distance     Dates: Start: 05/13/20       Description: 1) Individualized goal:  10 ft in // bars with mod A   2) Interventions: PT Group Therapy, PT E Stim Attended, PT Gait Training, PT Therapeutic Exercises, PT Neuro Re-Ed/Balance, PT Therapeutic Activity, and PT Manual Therapy          Note:     Goal Note filed on 05/13/20 1608 by Barbara Michaud, PT    Eval completed 5/13                        Problem: Mobility Transfers     Dates: Start: 05/13/20       Goal: STG-Within one week, patient will transfer bed to chair     Dates: Start: 05/13/20       Description: 1) Individualized goal:  mod A SPT or squat pivot with AD as needed   2) Interventions: PT Group Therapy, PT E Stim Attended, PT Gait Training, PT Therapeutic Exercises, PT Neuro Re-Ed/Balance, PT Therapeutic Activity, and PT Manual Therapy          Note:     Goal Note filed on 05/13/20 1608 by Barbara Michaud, PT    Eval completed 5/13                        Problem: PT-Long Term Goals     Dates: Start: 05/13/20       Goal: LTG-By discharge, patient will ambulate     Dates: Start: 05/13/20       Description: 150 ft with LRAD, spv           Goal: LTG-By discharge, patient will transfer one surface to another     Dates: Start: 05/13/20       Description: Spv with LRAD           Goal: LTG-By discharge, patient will ambulate up/down 4-6 stairs     Dates: Start: 05/13/20        Description: 1) Individualized goal:  2 steps with single HR and CGA   2) Interventions: PT Group Therapy, PT E Stim Attended, PT Gait Training, PT Therapeutic Exercises, PT Neuro Re-Ed/Balance, PT Therapeutic Activity, and PT Manual Therapy                Goal: LTG-By discharge, patient will transfer in/out of a car     Dates: Start: 05/13/20       Description: 1) Individualized goal:  Spv with LRAD   2) Interventions: PT Group Therapy, PT E Stim Attended, PT Gait Training, PT Therapeutic Exercises, PT Neuro Re-Ed/Balance, PT Therapeutic Activity, and PT Manual Therapy

## 2020-05-18 NOTE — CARE PLAN
Problem: Safety  Goal: Will remain free from injury  Description: Pt uses call light consistently and appropriately. Waits for assistance does not attempt self transfer this shift. Able to verbalize needs.   Outcome: PROGRESSING AS EXPECTED  Note: Pt uses call light  appropriately. Waits for assistance and does not attempt self transfer this shift. Able to verbalize needs.      Problem: Pain Management  Goal: Pain level will decrease to patient's comfort goal  Outcome: PROGRESSING AS EXPECTED  Note: Pt denies pain or discomfort tonight. Sleeps good.Will continue to monitor.

## 2020-05-18 NOTE — PROGRESS NOTES
"Rehab Progress Note     Encounter Date: 5/18/2020    CC: decreased mobility, fatigue    Interval Events (Subjective)  Patient sitting up in room. She reports she is doing well except that she has developed a cough. She reports she had robitussin over the weekend but that it quit working over time. Discussed long acting cough agent and she is in agreement. Discussed checking CXR.  Otherwise reviewed AM labs including mild hypokalemia. Denies NVD. Denies SOB.       IDT Team Meeting 5/14/2020  DC/Disposition:  5/26/20    Objective:  VITAL SIGNS: /76   Pulse 60   Temp 36.3 °C (97.3 °F) (Temporal)   Resp 20   Ht 1.6 m (5' 3\")   Wt 90.7 kg (200 lb)   SpO2 100%   BMI 35.43 kg/m²   Gen: NAD  Psych: Mood and affect appropriate  CV: RRR, 1+ edema  Resp: CTAB, no upper airway sounds  Abd: NTND  Neuro: AOx4, 5/5 BUE    Recent Results (from the past 72 hour(s))   Basic Metabolic Panel    Collection Time: 05/18/20  5:31 AM   Result Value Ref Range    Sodium 139 135 - 145 mmol/L    Potassium 3.5 (L) 3.6 - 5.5 mmol/L    Chloride 105 96 - 112 mmol/L    Co2 21 20 - 33 mmol/L    Glucose 109 (H) 65 - 99 mg/dL    Bun 17 8 - 22 mg/dL    Creatinine 0.81 0.50 - 1.40 mg/dL    Calcium 8.8 8.5 - 10.5 mg/dL    Anion Gap 13.0 7.0 - 16.0   ESTIMATED GFR    Collection Time: 05/18/20  5:31 AM   Result Value Ref Range    GFR If African American >60 >60 mL/min/1.73 m 2    GFR If Non African American >60 >60 mL/min/1.73 m 2       Current Facility-Administered Medications   Medication Frequency   • guaiFENesin ER (MUCINEX) tablet 600 mg Q12HRS   • amLODIPine (NORVASC) tablet 10 mg DAILY   • lisinopril (PRINIVIL) tablet 20 mg DAILY   • guaiFENesin (ROBITUSSIN) 100 MG/5ML solution 200 mg Q4HRS PRN   • nystatin (MYCOSTATIN) powder BID   • potassium chloride SA (Kdur) tablet 20 mEq DAILY   • Respiratory Therapy Consult Continuous RT   • oxyCODONE immediate-release (ROXICODONE) tablet 5 mg Q3HRS PRN   • oxyCODONE immediate release (ROXICODONE) " tablet 10 mg Q3HRS PRN   • tramadol (ULTRAM) 50 MG tablet 50 mg Q4HRS PRN   • hydrALAZINE (APRESOLINE) tablet 25 mg Q8HRS PRN   • acetaminophen (TYLENOL) tablet 650 mg Q4HRS PRN   • senna-docusate (PERICOLACE or SENOKOT S) 8.6-50 MG per tablet 2 Tab BID    And   • polyethylene glycol/lytes (MIRALAX) PACKET 1 Packet QDAY PRN    And   • magnesium hydroxide (MILK OF MAGNESIA) suspension 30 mL QDAY PRN    And   • bisacodyl (DULCOLAX) suppository 10 mg QDAY PRN   • artificial tears ophthalmic solution 1 Drop PRN   • benzocaine-menthol (CEPACOL) lozenge 1 Lozenge Q2HRS PRN   • mag hydrox-al hydrox-simeth (MAALOX PLUS ES or MYLANTA DS) suspension 20 mL Q2HRS PRN   • ondansetron (ZOFRAN ODT) dispertab 4 mg 4X/DAY PRN    Or   • ondansetron (ZOFRAN) syringe/vial injection 4 mg 4X/DAY PRN   • traZODone (DESYREL) tablet 50 mg QHS PRN   • sodium chloride (OCEAN) 0.65 % nasal spray 2 Spray PRN   • enoxaparin (LOVENOX) inj 40 mg DAILY   • ticagrelor (BRILINTA) tablet 90 mg BID   • aspirin EC (ECOTRIN) tablet 81 mg DAILY   • atorvastatin (LIPITOR) tablet 80 mg Nightly   • carvedilol (COREG) tablet 12.5 mg BID WITH MEALS   • levothyroxine (SYNTHROID) tablet 75 mcg AM ES   • mirtazapine (REMERON) orally disintegrating tab 7.5 mg QHS   • omeprazole (PRILOSEC) capsule 20 mg DAILY       Orders Placed This Encounter   Procedures   • Diet Order Cardiac     Standing Status:   Standing     Number of Occurrences:   1     Order Specific Question:   Diet:     Answer:   Cardiac [6]       Assessment:  Active Hospital Problems    Diagnosis   • *ACS (acute coronary syndrome) (HCC)   • Hypercholesteremia   • History of coronary angioplasty with insertion of stent   • Acquired hypothyroidism   • Essential hypertension       Medical Decision Making and Plan:  ACS s/p FELIPE x3 - presented from OSH with chest pain, s/p stenting on 5/8/20 with Dr. Akinapelli. Continue ASA, Brilinta.  -PT and OT for mobility and ADLs  -Follow-up cardiology, PCP     HTN -  Patient on Coreg 12.5 mg BID, Lisinopril 15 mg daily. Previously on Amlodipine and Valsartan  -Elevated on Admission, restart Amlodipine 5 mg, into 140s and 130s, will increase Lisinopril to 20 mg. Increase Amlodipine to 10 mg and monitor over weekend. Increase to Lisinopril 40 mg as into 150s.      HLD - Patient on Atorvastatin 80 mg daily     LE edema - Continue to monitor     Cough - New cough over weekend, check CXR 5/18/20 and start on Mucinex     Anemia - mild on admission labs at 11.6, continue to monitor    Hypokalemia - 3.1 on admission. Start K supplement, recheck on 5/18/20 - 3.5     Mood - Patient on Remeron 7.5 mg qHS at home.     Vitamin D deficiency - 22 on admission start 1000 U    Obesity, class II - BMI of 35.43 on admission. Dietitian to consult    GI - Start on Omeprazole.      DVT Ppx - Patient on Lovenox on transfer.    Total time:  26 minutes.  I spent greater than 50% of the time for patient care, counseling, and coordination on this date, including unit/floor time, and face-to-face time with the patient as per interval events and assessment and plan above. Topics discussed included new cough, start Mucinex, CXR, and stop K supplement. Increase Lisinopril as elevated SBP.     Ever Longo M.D.
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
## 2020-05-18 NOTE — THERAPY
Physical Therapy   Daily Treatment     Patient Name: Sena Boyce  Age:  79 y.o., Sex:  female  Medical Record #: [id number]  Today's Date: 5/18/2020     Precautions  Precautions: Fall Risk    Subjective    Pt seated in room, agreeable to PT.      Objective       05/18/20 0931   Precautions   Precautions Fall Risk   Gait Functional Level of Assist    Gait Level Of Assist Contact Guard Assist   Assistive Device Front Wheel Walker  (WC follow)   Distance (Feet)   (65 ft, 90 ft, 115 ft )   Deviation Bradykinetic;Antalgic;Decreased Heel Strike;Decreased Toe Off   Sitting Lower Body Exercises   Ankle Pumps 1 set of 10   Hip Abduction 1 set of 10   Hip Adduction 1 set of 10   Long Arc Quad 1 set of 10   Marching 1 set of 10   Hamstring Curl 1 set of 10   Nustep Resistance Level 3  (15 min, 0.31 miles)   Other Exercises Seated exercises completed in WC with pink tband for resistance    Interdisciplinary Plan of Care Collaboration   Patient Position at End of Therapy Seated;Call Light within Reach;Tray Table within Reach   PT Total Time Spent   PT Individual Total Time Spent (Mins) 60   PT Charge Group   PT Gait Training 1   PT Therapeutic Exercise 2   PT Therapeutic Activities 1       Assessment    Pt able to increase ambulation distance this session. Demonstrates no further need for WC follow. Reports bilateral feet feel less painful compared to last week.     Plan    Continue gait training with FWW, overall strength and conditioning, standing tolerance/ balance.     Physical Therapy Problems     Problem: Balance     Dates: Start: 05/13/20       Goal: STG-Within one week, patient will maintain static standing     Dates: Start: 05/13/20       Description: 1) Individualized goal:  3 min in // bars with min A   2) Interventions:  PT Group Therapy, PT E Stim Attended, PT Gait Training, PT Therapeutic Exercises, PT Neuro Re-Ed/Balance, PT Therapeutic Activity, and PT Manual Therapy      Note:     Goal Note filed on 05/13/20  1608 by Barbara Michaud, PT    Eval completed 5/13                        Problem: Mobility     Dates: Start: 05/13/20       Description:       Goal: STG-Within one week, patient will propel wheelchair community     Dates: Start: 05/13/20       Description: 1) Individualized goal:  50 ft using UEs and/or LEs, SBA   2) Interventions: PT Group Therapy, PT E Stim Attended, PT Gait Training, PT Therapeutic Exercises, PT Neuro Re-Ed/Balance, PT Therapeutic Activity, and PT Manual Therapy          Note:     Goal Note filed on 05/13/20 1608 by Barbara Michaud, PT    Eval completed 5/13                  Goal: STG-Within one week, patient will ambulate household distance     Dates: Start: 05/13/20       Description: 1) Individualized goal:  10 ft in // bars with mod A   2) Interventions: PT Group Therapy, PT E Stim Attended, PT Gait Training, PT Therapeutic Exercises, PT Neuro Re-Ed/Balance, PT Therapeutic Activity, and PT Manual Therapy          Note:     Goal Note filed on 05/13/20 1608 by Barbara Michaud, PT    Alessandro completed 5/13                        Problem: Mobility Transfers     Dates: Start: 05/13/20       Goal: STG-Within one week, patient will transfer bed to chair     Dates: Start: 05/13/20       Description: 1) Individualized goal:  mod A SPT or squat pivot with AD as needed   2) Interventions: PT Group Therapy, PT E Stim Attended, PT Gait Training, PT Therapeutic Exercises, PT Neuro Re-Ed/Balance, PT Therapeutic Activity, and PT Manual Therapy          Note:     Goal Note filed on 05/13/20 1608 by Barbara Michaud, PT    Alessandro completed 5/13                        Problem: PT-Long Term Goals     Dates: Start: 05/13/20       Goal: LTG-By discharge, patient will ambulate     Dates: Start: 05/13/20       Description: 150 ft with LRAD, spv           Goal: LTG-By discharge, patient will transfer one surface to another     Dates: Start: 05/13/20       Description: Spv with LRAD           Goal: LTG-By discharge, patient will ambulate  up/down 4-6 stairs     Dates: Start: 05/13/20       Description: 1) Individualized goal:  2 steps with single HR and CGA   2) Interventions: PT Group Therapy, PT E Stim Attended, PT Gait Training, PT Therapeutic Exercises, PT Neuro Re-Ed/Balance, PT Therapeutic Activity, and PT Manual Therapy                Goal: LTG-By discharge, patient will transfer in/out of a car     Dates: Start: 05/13/20       Description: 1) Individualized goal:  Spv with LRAD   2) Interventions: PT Group Therapy, PT E Stim Attended, PT Gait Training, PT Therapeutic Exercises, PT Neuro Re-Ed/Balance, PT Therapeutic Activity, and PT Manual Therapy

## 2020-05-18 NOTE — CARE PLAN
Problem: Communication  Goal: The ability to communicate needs accurately and effectively will improve  Description: Patient able to verbalize needs.  Will continue to monitor.   Outcome: PROGRESSING AS EXPECTED     Problem: Safety  Goal: Will remain free from injury  Description: Pt uses call light consistently and appropriately. Waits for assistance does not attempt self transfer this shift. Able to verbalize needs.   Outcome: PROGRESSING AS EXPECTED

## 2020-05-18 NOTE — THERAPY
"Occupational Therapy  Daily Treatment     Patient Name: Sena Boyce  Age:  79 y.o., Sex:  female  Medical Record #: [id number]  Today's Date: 5/18/2020     Precautions  Precautions: (P) Fall Risk    Safety   ADL Safety : (P) Requires Supervision for Safety  Bathroom Safety: (P) Requires Supervision for Safety    Subjective    \" How did I do?\"     Objective       05/18/20 0701   Precautions   Precautions Fall Risk   Safety    ADL Safety  Requires Supervision for Safety   Bathroom Safety Requires Supervision for Safety   Functional Level of Assist   Eating Independent   Grooming Independent  (comb hair seated at sink)   Bathing Supervision  (SBA when standing )   Bathing Description Long handled bath tool   Upper Body Dressing Supervision  (setup  don pull over shirt )   Lower Body Dressing Minimal Assist  (don pullupbief pants and socks using AE)   Lower Body Dressing Description Reacher;Sock aid   Toileting Minimal Assist  (assist for wiping for quality post BM )   Toilet Transfers Stand by Assist   Toilet Transfer Description Grab bar   Tub / Shower Transfers Stand by Assist   Tub Shower Transfer Description Grab bar   Interdisciplinary Plan of Care Collaboration   IDT Collaboration with  Nursing   Patient Position at End of Therapy Seated;Self Releasing Lap Belt Applied;Call Light within Reach;Tray Table within Reach  (eating breakfast)   Collaboration Comments  redness and  skin tear noticed  in buttocks fold    moisture creame applied post shower    OT Total Time Spent   OT Individual Total Time Spent (Mins) 60   OT Charge Group   OT Self Care / ADL 4       Assessment     refused  [place] hose   Improved independence with ADL tasks  Observed   Required cues for AE use     Plan     continued ADL , IADL, related mobility , strength/endurance building     Occupational Therapy Goals     Problem: Dressing     Dates: Start: 05/13/20       Goal: STG-Within one week, patient will dress UB     Dates: Start: 05/13/20       " Description: 1) Individualized Goal:  with supervision  2) Interventions:  OT Group Therapy, OT Self Care/ADL, OT Community Reintegration, OT Manual Ther Technique, OT Neuro Re-Ed/Balance, OT Sensory Int Techniques, OT Therapeutic Activity, OT Evaluation, and OT Therapeutic Exercise      Note:     Goal Note filed on 05/14/20 1127 by Jeanette Figueroa MS,OTR/L    Eval completed 5/13                  Goal: STG-Within one week, patient will dress LB     Dates: Start: 05/13/20       Description: 1) Individualized Goal:  with moderate assistance and AE  2) Interventions:  OT Group Therapy, OT Self Care/ADL, OT Community Reintegration, OT Manual Ther Technique, OT Neuro Re-Ed/Balance, OT Sensory Int Techniques, OT Therapeutic Activity, OT Evaluation, and OT Therapeutic Exercise      Note:     Goal Note filed on 05/14/20 1127 by Jeanette Figueroa MS,OTR/L    Eval completed 5/13                        Problem: Functional Transfers     Dates: Start: 05/13/20       Goal: STG-Within one week, patient will transfer to toilet     Dates: Start: 05/13/20       Description: 1) Individualized Goal:  with min A  2) Interventions:  OT Group Therapy, OT Self Care/ADL, OT Community Reintegration, OT Manual Ther Technique, OT Neuro Re-Ed/Balance, OT Sensory Int Techniques, OT Therapeutic Activity, OT Evaluation, and OT Therapeutic Exercise      Note:     Goal Note filed on 05/14/20 1127 by Jeanette Figueroa MS,OTR/L    Eval completed 5/13                        Problem: OT Long Term Goals     Dates: Start: 05/13/20       Goal: LTG-By discharge, patient will complete basic self care tasks     Dates: Start: 05/13/20       Description: 1) Individualized Goal:  with Supervision  2) Interventions:  OT Group Therapy, OT Self Care/ADL, OT Community Reintegration, OT Manual Ther Technique, OT Neuro Re-Ed/Balance, OT Sensory Int Techniques, OT Therapeutic Activity, OT Evaluation, and OT Therapeutic Exercise            Goal: LTG-By discharge,  patient will perform bathroom transfers     Dates: Start: 05/13/20       Description: 1) Individualized Goal:  with supervision  2) Interventions:  OT Group Therapy, OT Self Care/ADL, OT Community Reintegration, OT Manual Ther Technique, OT Neuro Re-Ed/Balance, OT Sensory Int Techniques, OT Therapeutic Activity, OT Evaluation, and OT Therapeutic Exercise                  Problem: Toileting     Dates: Start: 05/13/20       Description: 1) Individualized Goal:  with Min A  2) Interventions:  OT Group Therapy, OT Self Care/ADL, OT Community Reintegration, OT Manual Ther Technique, OT Neuro Re-Ed/Balance, OT Sensory Int Techniques, OT Therapeutic Activity, OT Evaluation, and OT Therapeutic Exercise      Goal: STG-Within one week, patient will complete toileting tasks     Dates: Start: 05/13/20       Description: 1) Individualized Goal:  with min A  2) Interventions:  OT Group Therapy, OT Self Care/ADL, OT Community Reintegration, OT Manual Ther Technique, OT Neuro Re-Ed/Balance, OT Sensory Int Techniques, OT Therapeutic Activity, OT Evaluation, and OT Therapeutic Exercise      Note:     Goal Note filed on 05/14/20 1127 by Jeanette Figueroa MS,OTR/L    Alessandro completed 5/13

## 2020-05-19 PROCEDURE — 97110 THERAPEUTIC EXERCISES: CPT

## 2020-05-19 PROCEDURE — 770010 HCHG ROOM/CARE - REHAB SEMI PRIVAT*

## 2020-05-19 PROCEDURE — 97530 THERAPEUTIC ACTIVITIES: CPT

## 2020-05-19 PROCEDURE — 97535 SELF CARE MNGMENT TRAINING: CPT

## 2020-05-19 PROCEDURE — 97112 NEUROMUSCULAR REEDUCATION: CPT

## 2020-05-19 PROCEDURE — 700111 HCHG RX REV CODE 636 W/ 250 OVERRIDE (IP): Performed by: PHYSICAL MEDICINE & REHABILITATION

## 2020-05-19 PROCEDURE — A9270 NON-COVERED ITEM OR SERVICE: HCPCS | Performed by: PHYSICAL MEDICINE & REHABILITATION

## 2020-05-19 PROCEDURE — 99232 SBSQ HOSP IP/OBS MODERATE 35: CPT | Performed by: PHYSICAL MEDICINE & REHABILITATION

## 2020-05-19 PROCEDURE — 700102 HCHG RX REV CODE 250 W/ 637 OVERRIDE(OP): Performed by: PHYSICAL MEDICINE & REHABILITATION

## 2020-05-19 PROCEDURE — 97116 GAIT TRAINING THERAPY: CPT

## 2020-05-19 RX ORDER — LOSARTAN POTASSIUM 25 MG/1
50 TABLET ORAL
Status: DISCONTINUED | OUTPATIENT
Start: 2020-05-19 | End: 2020-05-24

## 2020-05-19 RX ADMIN — LEVOTHYROXINE SODIUM 75 MCG: 75 TABLET ORAL at 04:59

## 2020-05-19 RX ADMIN — NYSTATIN: 100000 POWDER TOPICAL at 20:32

## 2020-05-19 RX ADMIN — NYSTATIN: 100000 POWDER TOPICAL at 08:48

## 2020-05-19 RX ADMIN — ASPIRIN 81 MG: 81 TABLET, COATED ORAL at 08:43

## 2020-05-19 RX ADMIN — TICAGRELOR 90 MG: 90 TABLET ORAL at 08:43

## 2020-05-19 RX ADMIN — DOCUSATE SODIUM 50 MG AND SENNOSIDES 8.6 MG 2 TABLET: 8.6; 5 TABLET, FILM COATED ORAL at 20:30

## 2020-05-19 RX ADMIN — MIRTAZAPINE 7.5 MG: 15 TABLET, ORALLY DISINTEGRATING ORAL at 20:30

## 2020-05-19 RX ADMIN — OMEPRAZOLE 20 MG: 20 CAPSULE, DELAYED RELEASE ORAL at 08:43

## 2020-05-19 RX ADMIN — DOCUSATE SODIUM 50 MG AND SENNOSIDES 8.6 MG 2 TABLET: 8.6; 5 TABLET, FILM COATED ORAL at 08:43

## 2020-05-19 RX ADMIN — TICAGRELOR 90 MG: 90 TABLET ORAL at 20:30

## 2020-05-19 RX ADMIN — GUAIFENESIN 600 MG: 600 TABLET, EXTENDED RELEASE ORAL at 08:43

## 2020-05-19 RX ADMIN — AMLODIPINE BESYLATE 10 MG: 5 TABLET ORAL at 08:43

## 2020-05-19 RX ADMIN — GUAIFENESIN 600 MG: 600 TABLET, EXTENDED RELEASE ORAL at 20:30

## 2020-05-19 RX ADMIN — CARVEDILOL 12.5 MG: 12.5 TABLET, FILM COATED ORAL at 17:42

## 2020-05-19 RX ADMIN — ATORVASTATIN CALCIUM 80 MG: 40 TABLET, FILM COATED ORAL at 20:30

## 2020-05-19 RX ADMIN — ENOXAPARIN SODIUM 40 MG: 100 INJECTION SUBCUTANEOUS at 08:43

## 2020-05-19 RX ADMIN — LISINOPRIL 40 MG: 20 TABLET ORAL at 08:43

## 2020-05-19 RX ADMIN — CARVEDILOL 12.5 MG: 12.5 TABLET, FILM COATED ORAL at 08:43

## 2020-05-19 ASSESSMENT — GAIT ASSESSMENTS
GAIT LEVEL OF ASSIST: CONTACT GUARD ASSIST
ASSISTIVE DEVICE: FRONT WHEEL WALKER
DISTANCE (FEET): 55

## 2020-05-19 ASSESSMENT — ACTIVITIES OF DAILY LIVING (ADL)
TOILETING_LEVEL_OF_ASSIST_DESCRIPTION: GRAB BAR;INCREASED TIME;INITIAL PREPARATION FOR TASK;SET-UP OF EQUIPMENT;SUPERVISION FOR SAFETY
TOILET_TRANSFER_DESCRIPTION: GRAB BAR;INCREASED TIME;SUPERVISION FOR SAFETY

## 2020-05-19 ASSESSMENT — PATIENT HEALTH QUESTIONNAIRE - PHQ9
2. FEELING DOWN, DEPRESSED, IRRITABLE, OR HOPELESS: NOT AT ALL
1. LITTLE INTEREST OR PLEASURE IN DOING THINGS: NOT AT ALL
2. FEELING DOWN, DEPRESSED, IRRITABLE, OR HOPELESS: NOT AT ALL
1. LITTLE INTEREST OR PLEASURE IN DOING THINGS: NOT AT ALL
SUM OF ALL RESPONSES TO PHQ9 QUESTIONS 1 AND 2: 0
SUM OF ALL RESPONSES TO PHQ9 QUESTIONS 1 AND 2: 0

## 2020-05-19 NOTE — THERAPY
Physical Therapy   Daily Treatment     Patient Name: Sena Boyce  Age:  79 y.o., Sex:  female  Medical Record #: [id number]  Today's Date: 5/19/2020     Precautions  Precautions: Fall Risk    Subjective    Pt reports she is agreeable to exercise     Objective       05/19/20 0931   Vitals   Pulse 65   Pulse Oximetry 99 %   O2 Delivery Device None - Room Air   Vitals Comments Pt reports SOB with nustpe exercise but vitals stable   Sitting Lower Body Exercises   Nustep Resistance Level 4  (8 min, VCs for pacing, pt c/o SOB with increased resistance)   Interdisciplinary Plan of Care Collaboration   Patient Position at End of Therapy Seated;Call Light within Reach;Tray Table within Reach;Self Releasing Lap Belt Applied   PT Total Time Spent   PT Individual Total Time Spent (Mins) 30   PT Charge Group   PT Therapeutic Exercise 1   PT Therapeutic Activities 1     SPT practice with FWW, SBA - no assist needed    Assessment    Pt demos improving activity tolerance but does continue to demo SOB with sustained acitvity despite vitals stable    Plan    Continue gait training with FWW, overall strength and conditioning, standing tolerance/ balance.     Physical Therapy Problems     Problem: Balance     Dates: Start: 05/13/20       Goal: STG-Within one week, patient will maintain static standing     Dates: Start: 05/13/20       Description: 1) Individualized goal:  3 min in // bars with min A   2) Interventions:  PT Group Therapy, PT E Stim Attended, PT Gait Training, PT Therapeutic Exercises, PT Neuro Re-Ed/Balance, PT Therapeutic Activity, and PT Manual Therapy      Note:     Goal Note filed on 05/13/20 1608 by Barbara Michaud, PT    Alessandro completed 5/13                        Problem: Mobility     Dates: Start: 05/13/20       Description:       Goal: STG-Within one week, patient will propel wheelchair community     Dates: Start: 05/13/20       Description: 1) Individualized goal:  50 ft using UEs and/or LEs, SBA   2) Interventions:  PT Group Therapy, PT E Stim Attended, PT Gait Training, PT Therapeutic Exercises, PT Neuro Re-Ed/Balance, PT Therapeutic Activity, and PT Manual Therapy          Note:     Goal Note filed on 05/13/20 1608 by Barbara Michaud, PT    Franciaal completed 5/13                  Goal: STG-Within one week, patient will ambulate household distance     Dates: Start: 05/13/20       Description: 1) Individualized goal:  10 ft in // bars with mod A   2) Interventions: PT Group Therapy, PT E Stim Attended, PT Gait Training, PT Therapeutic Exercises, PT Neuro Re-Ed/Balance, PT Therapeutic Activity, and PT Manual Therapy          Note:     Goal Note filed on 05/13/20 1608 by Barbara Michaud, PT    Franciaal completed 5/13                        Problem: Mobility Transfers     Dates: Start: 05/13/20       Goal: STG-Within one week, patient will transfer bed to chair     Dates: Start: 05/13/20       Description: 1) Individualized goal:  mod A SPT or squat pivot with AD as needed   2) Interventions: PT Group Therapy, PT E Stim Attended, PT Gait Training, PT Therapeutic Exercises, PT Neuro Re-Ed/Balance, PT Therapeutic Activity, and PT Manual Therapy          Note:     Goal Note filed on 05/13/20 1608 by Barbara Michaud, PT    Franciaal completed 5/13                        Problem: PT-Long Term Goals     Dates: Start: 05/13/20       Goal: LTG-By discharge, patient will ambulate     Dates: Start: 05/13/20       Description: 150 ft with LRAD, spv           Goal: LTG-By discharge, patient will transfer one surface to another     Dates: Start: 05/13/20       Description: Spv with LRAD           Goal: LTG-By discharge, patient will ambulate up/down 4-6 stairs     Dates: Start: 05/13/20       Description: 1) Individualized goal:  2 steps with single HR and CGA   2) Interventions: PT Group Therapy, PT E Stim Attended, PT Gait Training, PT Therapeutic Exercises, PT Neuro Re-Ed/Balance, PT Therapeutic Activity, and PT Manual Therapy                Goal: LTG-By discharge,  patient will transfer in/out of a car     Dates: Start: 05/13/20       Description: 1) Individualized goal:  Spv with LRAD   2) Interventions: PT Group Therapy, PT E Stim Attended, PT Gait Training, PT Therapeutic Exercises, PT Neuro Re-Ed/Balance, PT Therapeutic Activity, and PT Manual Therapy

## 2020-05-19 NOTE — CARE PLAN
Problem: Safety  Goal: Will remain free from falls  Intervention: Implement fall precautions  Note: Use of call light encouraged to make needs known.Fall precautions and frequent rounding in place for safety.Call light within reach.Will continue to monitor and assess needs and safety.     Problem: Bowel/Gastric:  Goal: Normal bowel function is maintained or improved  Intervention: Educate patient and significant other/support system about signs and symptoms of constipation and interventions to implement  Note: Pt refused scheduled senna at hs.Continent of bowel.LBM 05/18.Will continue to monitor.

## 2020-05-19 NOTE — THERAPY
"Occupational Therapy  Daily Treatment     Patient Name: Sena Boyce  Age:  79 y.o., Sex:  female  Medical Record #: [id number]  Today's Date: 5/19/2020     Precautions  Precautions: (P) Fall Risk    Safety   ADL Safety : (P) Requires Supervision for Safety  Bathroom Safety: (P) Requires Supervision for Safety  Comments: (P) see below notes for ADL performance details.    Subjective    \"I need to use the bathroom\"     Objective       05/19/20 1431   Precautions   Precautions Fall Risk   Safety    ADL Safety  Requires Supervision for Safety   Bathroom Safety Requires Supervision for Safety   Comments see below notes for ADL performance details.   Functional Level of Assist   Toileting Contact Guard Assist   Toileting Description Grab bar;Increased time;Initial preparation for task;Set-up of equipment;Supervision for safety   Toilet Transfers Stand by Assist   Toilet Transfer Description Grab bar;Increased time;Supervision for safety   Sitting Upper Body Exercises   Bilateral Row 3 sets of 15;Bilateral;Weight (See Comments for lbs)  (25# weighted pulleys)   Tricep Press 3 sets of 15;Bilateral;Weight (See Comments for lbs)  (Rickshaw facing fwd with 20#)   Interdisciplinary Plan of Care Collaboration   IDT Collaboration with  Occupational Therapist   Patient Position at End of Therapy Seated;Self Releasing Lap Belt Applied;Other (Comments)  (transfer of care to PT)   Collaboration Comments discussed pt's POC and CLOF with primary OT   OT Total Time Spent   OT Individual Total Time Spent (Mins) 30   OT Charge Group   OT Self Care / ADL 1   OT Therapeutic Exercise  1       Assessment    Pt tolerated session well and demonstrates improved standing tolerance for toilet transfers. She completed all UB exercises with good form and did not report any R shoulder pain during or after exercises.     Plan    ADLs, standing activity tolerance, functional mobility, endurance, balance (expected d/c date 5-)    Occupational " Therapy Goals     Problem: Dressing     Dates: Start: 05/13/20       Goal: STG-Within one week, patient will dress UB     Dates: Start: 05/13/20       Description: 1) Individualized Goal:  with supervision  2) Interventions:  OT Group Therapy, OT Self Care/ADL, OT Community Reintegration, OT Manual Ther Technique, OT Neuro Re-Ed/Balance, OT Sensory Int Techniques, OT Therapeutic Activity, OT Evaluation, and OT Therapeutic Exercise      Note:     Goal Note filed on 05/14/20 1127 by Jeanette Figueroa MS,OTR/L    Alessandro completed 5/13                  Goal: STG-Within one week, patient will dress LB     Dates: Start: 05/13/20       Description: 1) Individualized Goal:  with moderate assistance and AE  2) Interventions:  OT Group Therapy, OT Self Care/ADL, OT Community Reintegration, OT Manual Ther Technique, OT Neuro Re-Ed/Balance, OT Sensory Int Techniques, OT Therapeutic Activity, OT Evaluation, and OT Therapeutic Exercise      Note:     Goal Note filed on 05/14/20 1127 by Jeanette Figueroa MS,OTR/L    Alessandro completed 5/13                        Problem: Functional Transfers     Dates: Start: 05/13/20       Goal: STG-Within one week, patient will transfer to toilet     Dates: Start: 05/13/20       Description: 1) Individualized Goal:  with min A  2) Interventions:  OT Group Therapy, OT Self Care/ADL, OT Community Reintegration, OT Manual Ther Technique, OT Neuro Re-Ed/Balance, OT Sensory Int Techniques, OT Therapeutic Activity, OT Evaluation, and OT Therapeutic Exercise      Note:     Goal Note filed on 05/14/20 1127 by Jeanette Figueroa MS,OTR/L    Alessandro completed 5/13                        Problem: OT Long Term Goals     Dates: Start: 05/13/20       Goal: LTG-By discharge, patient will complete basic self care tasks     Dates: Start: 05/13/20       Description: 1) Individualized Goal:  with Supervision  2) Interventions:  OT Group Therapy, OT Self Care/ADL, OT Community Reintegration, OT Manual Ther Technique, OT  Neuro Re-Ed/Balance, OT Sensory Int Techniques, OT Therapeutic Activity, OT Evaluation, and OT Therapeutic Exercise            Goal: LTG-By discharge, patient will perform bathroom transfers     Dates: Start: 05/13/20       Description: 1) Individualized Goal:  with supervision  2) Interventions:  OT Group Therapy, OT Self Care/ADL, OT Community Reintegration, OT Manual Ther Technique, OT Neuro Re-Ed/Balance, OT Sensory Int Techniques, OT Therapeutic Activity, OT Evaluation, and OT Therapeutic Exercise                  Problem: Toileting     Dates: Start: 05/13/20       Description: 1) Individualized Goal:  with Min A  2) Interventions:  OT Group Therapy, OT Self Care/ADL, OT Community Reintegration, OT Manual Ther Technique, OT Neuro Re-Ed/Balance, OT Sensory Int Techniques, OT Therapeutic Activity, OT Evaluation, and OT Therapeutic Exercise      Goal: STG-Within one week, patient will complete toileting tasks     Dates: Start: 05/13/20       Description: 1) Individualized Goal:  with min A  2) Interventions:  OT Group Therapy, OT Self Care/ADL, OT Community Reintegration, OT Manual Ther Technique, OT Neuro Re-Ed/Balance, OT Sensory Int Techniques, OT Therapeutic Activity, OT Evaluation, and OT Therapeutic Exercise      Note:     Goal Note filed on 05/14/20 1127 by Jeanette Figueroa MS,OTR/L    Eval completed 5/13

## 2020-05-19 NOTE — THERAPY
"Occupational Therapy  Daily Treatment     Patient Name: Sena Boyce  Age:  79 y.o., Sex:  female  Medical Record #: [id number]  Today's Date: 5/19/2020     Precautions  Precautions: Fall Risk    Safety   ADL Safety : Requires Supervision for Safety  Bathroom Safety: Requires Supervision for Safety    Subjective    \"I have a bed sore. I never had this before.\"     Objective       05/19/20 1001   Non Verbal Descriptors   Non Verbal Scale  Calm   Cognition    Level of Consciousness Alert   Sleep/Wake Cycle   Sleep & Rest Awake;Out of bed   Functional Level of Assist   Bed, Chair, Wheelchair Transfer Stand by Assist   Bed Chair Wheelchair Transfer Description   (SBA FWW SPT; w/c to mat table)   IADL Treatments   Comments Grocery Shopping Task: Pt gathered breakfast items within a 20 dollar limit while standing at FWW. Pt able to calculate total and able to stand for duration of task. Rest break taken and then pt walked 150 feet with grocery cart and CGA. No LOB.   Balance   Comments Pt stood in // bars and played Ladder Ball for dynamic standing. Pt played 4 rounds. Pt walked the distance of // bars and back between each round. Rest breaks taken.   Interdisciplinary Plan of Care Collaboration   IDT Collaboration with  Physical Therapist   Patient Position at End of Therapy Seated;Call Light within Reach;Tray Table within Reach;Phone within Reach;Self Releasing Lap Belt Applied   Collaboration Comments re: CLOF   OT Total Time Spent   OT Individual Total Time Spent (Mins) 60   OT Charge Group   OT Self Care / ADL 1   OT Neuromuscular Re-education / Balance 1   OT Therapy Activity 2       Assessment    Pt with good improvements since this OT worked with pt last on Saturday. Pt walking 150 feet with grocery cart without LOB. Pt with significantly less anxiety this session.    Plan    ADLs, standing activity tolerance, functional mobility, endurance, balance (expected d/c date 5-)    Occupational Therapy Goals     " Problem: Dressing     Dates: Start: 05/13/20       Goal: STG-Within one week, patient will dress UB     Dates: Start: 05/13/20       Description: 1) Individualized Goal:  with supervision  2) Interventions:  OT Group Therapy, OT Self Care/ADL, OT Community Reintegration, OT Manual Ther Technique, OT Neuro Re-Ed/Balance, OT Sensory Int Techniques, OT Therapeutic Activity, OT Evaluation, and OT Therapeutic Exercise      Note:     Goal Note filed on 05/14/20 1127 by Jeanette Figueroa MS,OTR/L    Eval completed 5/13                  Goal: STG-Within one week, patient will dress LB     Dates: Start: 05/13/20       Description: 1) Individualized Goal:  with moderate assistance and AE  2) Interventions:  OT Group Therapy, OT Self Care/ADL, OT Community Reintegration, OT Manual Ther Technique, OT Neuro Re-Ed/Balance, OT Sensory Int Techniques, OT Therapeutic Activity, OT Evaluation, and OT Therapeutic Exercise      Note:     Goal Note filed on 05/14/20 1127 by Jeanette Figueroa MS,OTR/L    Alessandro completed 5/13                        Problem: Functional Transfers     Dates: Start: 05/13/20       Goal: STG-Within one week, patient will transfer to toilet     Dates: Start: 05/13/20       Description: 1) Individualized Goal:  with min A  2) Interventions:  OT Group Therapy, OT Self Care/ADL, OT Community Reintegration, OT Manual Ther Technique, OT Neuro Re-Ed/Balance, OT Sensory Int Techniques, OT Therapeutic Activity, OT Evaluation, and OT Therapeutic Exercise      Note:     Goal Note filed on 05/14/20 1127 by Jeanette Figueroa MS,OTR/L    Eval completed 5/13                        Problem: OT Long Term Goals     Dates: Start: 05/13/20       Goal: LTG-By discharge, patient will complete basic self care tasks     Dates: Start: 05/13/20       Description: 1) Individualized Goal:  with Supervision  2) Interventions:  OT Group Therapy, OT Self Care/ADL, OT Community Reintegration, OT Manual Ther Technique, OT Neuro  Re-Ed/Balance, OT Sensory Int Techniques, OT Therapeutic Activity, OT Evaluation, and OT Therapeutic Exercise            Goal: LTG-By discharge, patient will perform bathroom transfers     Dates: Start: 05/13/20       Description: 1) Individualized Goal:  with supervision  2) Interventions:  OT Group Therapy, OT Self Care/ADL, OT Community Reintegration, OT Manual Ther Technique, OT Neuro Re-Ed/Balance, OT Sensory Int Techniques, OT Therapeutic Activity, OT Evaluation, and OT Therapeutic Exercise                  Problem: Toileting     Dates: Start: 05/13/20       Description: 1) Individualized Goal:  with Min A  2) Interventions:  OT Group Therapy, OT Self Care/ADL, OT Community Reintegration, OT Manual Ther Technique, OT Neuro Re-Ed/Balance, OT Sensory Int Techniques, OT Therapeutic Activity, OT Evaluation, and OT Therapeutic Exercise      Goal: STG-Within one week, patient will complete toileting tasks     Dates: Start: 05/13/20       Description: 1) Individualized Goal:  with min A  2) Interventions:  OT Group Therapy, OT Self Care/ADL, OT Community Reintegration, OT Manual Ther Technique, OT Neuro Re-Ed/Balance, OT Sensory Int Techniques, OT Therapeutic Activity, OT Evaluation, and OT Therapeutic Exercise      Note:     Goal Note filed on 05/14/20 1127 by Jeanette Figueroa MS,OTR/L    Eval completed 5/13

## 2020-05-19 NOTE — PROGRESS NOTES
"Rehab Progress Note     Encounter Date: 5/19/2020    CC: decreased mobility, fatigue    Interval Events (Subjective)  Patient sitting up in room. She reports her cough is somewhat improved on longer acting cough medicine. Reviewed CXR with patient and no pulmonary abnormalities. She reports she will monitor for fever or chills or worsening productive cough. She also reports she was previously on an ARB and not ACEi. Discussed will consider switch. Maryam RUBIN.     IDT Team Meeting 5/14/2020  DC/Disposition:  5/26/20    Objective:  VITAL SIGNS: /77   Pulse 65   Temp 36.8 °C (98.3 °F) (Temporal)   Resp 18   Ht 1.6 m (5' 3\")   Wt 90.7 kg (200 lb)   SpO2 99%   BMI 35.43 kg/m²   Gen: NAD  Psych: Mood and affect appropriate  CV: RRR, 1+ edema  Resp: CTAB, no upper airway sounds  Abd: NTND  Neuro: AOx4, 5/5 BUE  Unchanged from 5/18/20, in addition no changes to lung but coughing during interview    Recent Results (from the past 72 hour(s))   Basic Metabolic Panel    Collection Time: 05/18/20  5:31 AM   Result Value Ref Range    Sodium 139 135 - 145 mmol/L    Potassium 3.5 (L) 3.6 - 5.5 mmol/L    Chloride 105 96 - 112 mmol/L    Co2 21 20 - 33 mmol/L    Glucose 109 (H) 65 - 99 mg/dL    Bun 17 8 - 22 mg/dL    Creatinine 0.81 0.50 - 1.40 mg/dL    Calcium 8.8 8.5 - 10.5 mg/dL    Anion Gap 13.0 7.0 - 16.0   ESTIMATED GFR    Collection Time: 05/18/20  5:31 AM   Result Value Ref Range    GFR If African American >60 >60 mL/min/1.73 m 2    GFR If Non African American >60 >60 mL/min/1.73 m 2       Current Facility-Administered Medications   Medication Frequency   • guaiFENesin ER (MUCINEX) tablet 600 mg Q12HRS   • lisinopril (PRINIVIL) tablet 40 mg DAILY   • amLODIPine (NORVASC) tablet 10 mg DAILY   • guaiFENesin (ROBITUSSIN) 100 MG/5ML solution 200 mg Q4HRS PRN   • nystatin (MYCOSTATIN) powder BID   • Respiratory Therapy Consult Continuous RT   • oxyCODONE immediate-release (ROXICODONE) tablet 5 mg Q3HRS PRN   • " oxyCODONE immediate release (ROXICODONE) tablet 10 mg Q3HRS PRN   • tramadol (ULTRAM) 50 MG tablet 50 mg Q4HRS PRN   • hydrALAZINE (APRESOLINE) tablet 25 mg Q8HRS PRN   • acetaminophen (TYLENOL) tablet 650 mg Q4HRS PRN   • senna-docusate (PERICOLACE or SENOKOT S) 8.6-50 MG per tablet 2 Tab BID    And   • polyethylene glycol/lytes (MIRALAX) PACKET 1 Packet QDAY PRN    And   • magnesium hydroxide (MILK OF MAGNESIA) suspension 30 mL QDAY PRN    And   • bisacodyl (DULCOLAX) suppository 10 mg QDAY PRN   • artificial tears ophthalmic solution 1 Drop PRN   • benzocaine-menthol (CEPACOL) lozenge 1 Lozenge Q2HRS PRN   • mag hydrox-al hydrox-simeth (MAALOX PLUS ES or MYLANTA DS) suspension 20 mL Q2HRS PRN   • ondansetron (ZOFRAN ODT) dispertab 4 mg 4X/DAY PRN    Or   • ondansetron (ZOFRAN) syringe/vial injection 4 mg 4X/DAY PRN   • traZODone (DESYREL) tablet 50 mg QHS PRN   • sodium chloride (OCEAN) 0.65 % nasal spray 2 Spray PRN   • enoxaparin (LOVENOX) inj 40 mg DAILY   • ticagrelor (BRILINTA) tablet 90 mg BID   • aspirin EC (ECOTRIN) tablet 81 mg DAILY   • atorvastatin (LIPITOR) tablet 80 mg Nightly   • carvedilol (COREG) tablet 12.5 mg BID WITH MEALS   • levothyroxine (SYNTHROID) tablet 75 mcg AM ES   • mirtazapine (REMERON) orally disintegrating tab 7.5 mg QHS   • omeprazole (PRILOSEC) capsule 20 mg DAILY       Orders Placed This Encounter   Procedures   • Diet Order Cardiac     Standing Status:   Standing     Number of Occurrences:   1     Order Specific Question:   Diet:     Answer:   Cardiac [6]       Assessment:  Active Hospital Problems    Diagnosis   • *ACS (acute coronary syndrome) (HCC)   • Hypercholesteremia   • History of coronary angioplasty with insertion of stent   • Acquired hypothyroidism   • Essential hypertension       Medical Decision Making and Plan:  ACS s/p FELIPE x3 - presented from OSH with chest pain, s/p stenting on 5/8/20 with Dr. Olivas. Continue ASA, Brilinta.  -PT and OT for mobility and  ADLs  -Follow-up cardiology, PCP     HTN - Patient on Coreg 12.5 mg BID, Lisinopril 15 mg daily. Previously on Amlodipine and Valsartan  -Elevated on Admission, restart Amlodipine 5 mg, into 140s and 130s, will increase Lisinopril to 20 mg. Increase Amlodipine to 10 mg and monitor over weekend. Switch from ACEi to ARB     HLD - Patient on Atorvastatin 80 mg daily     LE edema - Continue to monitor     Cough - New cough over weekend, check CXR 5/18/20 and start on Mucinex   -CXR - wnl. Cough improved on Mucinex, will switch ACEi to ARB    Anemia - mild on admission labs at 11.6, continue to monitor    Hypokalemia - 3.1 on admission. Start K supplement, recheck on 5/18/20 - 3.5     Mood - Patient on Remeron 7.5 mg qHS at home.     Vitamin D deficiency - 22 on admission start 1000 U    Obesity, class II - BMI of 35.43 on admission. Dietitian to consult    GI - Start on Omeprazole.      DVT Ppx - Patient on Lovenox on transfer.    Total time:  25 minutes.  I spent greater than 50% of the time for patient care, counseling, and coordination on this date, including unit/floor time, and face-to-face time with the patient as per interval events and assessment and plan above. Topics discussed included ongoing cough, improved on Mucinex, CXR clear, and change ACE to ARB.     Ever Longo M.D.

## 2020-05-20 PROCEDURE — 700111 HCHG RX REV CODE 636 W/ 250 OVERRIDE (IP): Performed by: PHYSICAL MEDICINE & REHABILITATION

## 2020-05-20 PROCEDURE — 97110 THERAPEUTIC EXERCISES: CPT

## 2020-05-20 PROCEDURE — 97535 SELF CARE MNGMENT TRAINING: CPT

## 2020-05-20 PROCEDURE — A9270 NON-COVERED ITEM OR SERVICE: HCPCS | Performed by: PHYSICAL MEDICINE & REHABILITATION

## 2020-05-20 PROCEDURE — 97530 THERAPEUTIC ACTIVITIES: CPT

## 2020-05-20 PROCEDURE — 99232 SBSQ HOSP IP/OBS MODERATE 35: CPT | Performed by: PHYSICAL MEDICINE & REHABILITATION

## 2020-05-20 PROCEDURE — 770010 HCHG ROOM/CARE - REHAB SEMI PRIVAT*

## 2020-05-20 PROCEDURE — 97112 NEUROMUSCULAR REEDUCATION: CPT

## 2020-05-20 PROCEDURE — 700102 HCHG RX REV CODE 250 W/ 637 OVERRIDE(OP): Performed by: PHYSICAL MEDICINE & REHABILITATION

## 2020-05-20 PROCEDURE — 97116 GAIT TRAINING THERAPY: CPT

## 2020-05-20 RX ORDER — POTASSIUM CHLORIDE 20 MEQ/1
20 TABLET, EXTENDED RELEASE ORAL DAILY
Status: DISCONTINUED | OUTPATIENT
Start: 2020-05-21 | End: 2020-05-26 | Stop reason: HOSPADM

## 2020-05-20 RX ORDER — FUROSEMIDE 40 MG/1
40 TABLET ORAL DAILY
Status: DISCONTINUED | OUTPATIENT
Start: 2020-05-20 | End: 2020-05-21

## 2020-05-20 RX ADMIN — LOSARTAN POTASSIUM 50 MG: 25 TABLET, FILM COATED ORAL at 04:51

## 2020-05-20 RX ADMIN — DOCUSATE SODIUM 50 MG AND SENNOSIDES 8.6 MG 2 TABLET: 8.6; 5 TABLET, FILM COATED ORAL at 09:00

## 2020-05-20 RX ADMIN — ENOXAPARIN SODIUM 40 MG: 100 INJECTION SUBCUTANEOUS at 08:39

## 2020-05-20 RX ADMIN — ASPIRIN 81 MG: 81 TABLET, COATED ORAL at 08:38

## 2020-05-20 RX ADMIN — CARVEDILOL 12.5 MG: 12.5 TABLET, FILM COATED ORAL at 17:22

## 2020-05-20 RX ADMIN — MIRTAZAPINE 7.5 MG: 15 TABLET, ORALLY DISINTEGRATING ORAL at 19:53

## 2020-05-20 RX ADMIN — NYSTATIN: 100000 POWDER TOPICAL at 20:01

## 2020-05-20 RX ADMIN — LEVOTHYROXINE SODIUM 75 MCG: 75 TABLET ORAL at 04:51

## 2020-05-20 RX ADMIN — TICAGRELOR 90 MG: 90 TABLET ORAL at 19:53

## 2020-05-20 RX ADMIN — ATORVASTATIN CALCIUM 80 MG: 40 TABLET, FILM COATED ORAL at 19:53

## 2020-05-20 RX ADMIN — TICAGRELOR 90 MG: 90 TABLET ORAL at 08:38

## 2020-05-20 RX ADMIN — GUAIFENESIN 200 MG: 100 SOLUTION ORAL at 16:07

## 2020-05-20 RX ADMIN — AMLODIPINE BESYLATE 10 MG: 5 TABLET ORAL at 08:38

## 2020-05-20 RX ADMIN — CARVEDILOL 12.5 MG: 12.5 TABLET, FILM COATED ORAL at 08:38

## 2020-05-20 RX ADMIN — BENZOCAINE AND MENTHOL 1 LOZENGE: 15; 3.6 LOZENGE ORAL at 19:55

## 2020-05-20 RX ADMIN — GUAIFENESIN 600 MG: 600 TABLET, EXTENDED RELEASE ORAL at 08:38

## 2020-05-20 RX ADMIN — OMEPRAZOLE 20 MG: 20 CAPSULE, DELAYED RELEASE ORAL at 08:38

## 2020-05-20 RX ADMIN — FUROSEMIDE 40 MG: 40 TABLET ORAL at 11:56

## 2020-05-20 RX ADMIN — GUAIFENESIN 600 MG: 600 TABLET, EXTENDED RELEASE ORAL at 19:53

## 2020-05-20 ASSESSMENT — PATIENT HEALTH QUESTIONNAIRE - PHQ9
SUM OF ALL RESPONSES TO PHQ9 QUESTIONS 1 AND 2: 0
2. FEELING DOWN, DEPRESSED, IRRITABLE, OR HOPELESS: NOT AT ALL
2. FEELING DOWN, DEPRESSED, IRRITABLE, OR HOPELESS: NOT AT ALL
1. LITTLE INTEREST OR PLEASURE IN DOING THINGS: NOT AT ALL
1. LITTLE INTEREST OR PLEASURE IN DOING THINGS: NOT AT ALL
SUM OF ALL RESPONSES TO PHQ9 QUESTIONS 1 AND 2: 0

## 2020-05-20 ASSESSMENT — GAIT ASSESSMENTS
DEVIATION: BRADYKINETIC;ANTALGIC;DECREASED HEEL STRIKE;DECREASED TOE OFF
ASSISTIVE DEVICE: FRONT WHEEL WALKER
GAIT LEVEL OF ASSIST: STAND BY ASSIST
DISTANCE (FEET): 90

## 2020-05-20 ASSESSMENT — ACTIVITIES OF DAILY LIVING (ADL)
TOILET_TRANSFER_DESCRIPTION: GRAB BAR;INCREASED TIME;SET-UP OF EQUIPMENT;SUPERVISION FOR SAFETY
BED_CHAIR_WHEELCHAIR_TRANSFER_DESCRIPTION: VERBAL CUEING;INCREASED TIME

## 2020-05-20 NOTE — PROGRESS NOTES
"Rehab Progress Note     Encounter Date: 5/20/2020    CC: decreased mobility, fatigue    Interval Events (Subjective)  Patient sitting up in room. She reports her legs are a little more swollen today. Discussed that we have already restarted her Lasix and K supplement as she is less dehydrated. Discussed that we had concerns about her fluid intake which has improved. Discussed checking AM labs. Otherwise therapy is going very well. She denies NVD.     IDT Team Meeting 5/14/2020  DC/Disposition:  5/26/20    Objective:  VITAL SIGNS: /71   Pulse 68   Temp 36.6 °C (97.8 °F) (Temporal)   Resp 18   Ht 1.6 m (5' 3\")   Wt 90.7 kg (200 lb)   SpO2 96% Comment: post exercise  BMI 35.43 kg/m²   Gen: NAD  Psych: Mood and affect appropriate  CV: RRR, 2+ edema  Resp: CTAB, no upper airway sounds  Abd: NTND  Neuro: AOx4, pushing wheelchair BUE    Recent Results (from the past 72 hour(s))   Basic Metabolic Panel    Collection Time: 05/18/20  5:31 AM   Result Value Ref Range    Sodium 139 135 - 145 mmol/L    Potassium 3.5 (L) 3.6 - 5.5 mmol/L    Chloride 105 96 - 112 mmol/L    Co2 21 20 - 33 mmol/L    Glucose 109 (H) 65 - 99 mg/dL    Bun 17 8 - 22 mg/dL    Creatinine 0.81 0.50 - 1.40 mg/dL    Calcium 8.8 8.5 - 10.5 mg/dL    Anion Gap 13.0 7.0 - 16.0   ESTIMATED GFR    Collection Time: 05/18/20  5:31 AM   Result Value Ref Range    GFR If African American >60 >60 mL/min/1.73 m 2    GFR If Non African American >60 >60 mL/min/1.73 m 2       Current Facility-Administered Medications   Medication Frequency   • furosemide (LASIX) tablet 40 mg DAILY   • [START ON 5/21/2020] potassium chloride SA (Kdur) tablet 20 mEq DAILY   • losartan (COZAAR) tablet 50 mg Q DAY   • guaiFENesin ER (MUCINEX) tablet 600 mg Q12HRS   • amLODIPine (NORVASC) tablet 10 mg DAILY   • guaiFENesin (ROBITUSSIN) 100 MG/5ML solution 200 mg Q4HRS PRN   • nystatin (MYCOSTATIN) powder BID   • Respiratory Therapy Consult Continuous RT   • oxyCODONE " immediate-release (ROXICODONE) tablet 5 mg Q3HRS PRN   • oxyCODONE immediate release (ROXICODONE) tablet 10 mg Q3HRS PRN   • tramadol (ULTRAM) 50 MG tablet 50 mg Q4HRS PRN   • hydrALAZINE (APRESOLINE) tablet 25 mg Q8HRS PRN   • acetaminophen (TYLENOL) tablet 650 mg Q4HRS PRN   • senna-docusate (PERICOLACE or SENOKOT S) 8.6-50 MG per tablet 2 Tab BID    And   • polyethylene glycol/lytes (MIRALAX) PACKET 1 Packet QDAY PRN    And   • magnesium hydroxide (MILK OF MAGNESIA) suspension 30 mL QDAY PRN    And   • bisacodyl (DULCOLAX) suppository 10 mg QDAY PRN   • artificial tears ophthalmic solution 1 Drop PRN   • benzocaine-menthol (CEPACOL) lozenge 1 Lozenge Q2HRS PRN   • mag hydrox-al hydrox-simeth (MAALOX PLUS ES or MYLANTA DS) suspension 20 mL Q2HRS PRN   • ondansetron (ZOFRAN ODT) dispertab 4 mg 4X/DAY PRN    Or   • ondansetron (ZOFRAN) syringe/vial injection 4 mg 4X/DAY PRN   • traZODone (DESYREL) tablet 50 mg QHS PRN   • sodium chloride (OCEAN) 0.65 % nasal spray 2 Spray PRN   • enoxaparin (LOVENOX) inj 40 mg DAILY   • ticagrelor (BRILINTA) tablet 90 mg BID   • aspirin EC (ECOTRIN) tablet 81 mg DAILY   • atorvastatin (LIPITOR) tablet 80 mg Nightly   • carvedilol (COREG) tablet 12.5 mg BID WITH MEALS   • levothyroxine (SYNTHROID) tablet 75 mcg AM ES   • mirtazapine (REMERON) orally disintegrating tab 7.5 mg QHS   • omeprazole (PRILOSEC) capsule 20 mg DAILY       Orders Placed This Encounter   Procedures   • Diet Order Cardiac     Standing Status:   Standing     Number of Occurrences:   1     Order Specific Question:   Diet:     Answer:   Cardiac [6]       Assessment:  Active Hospital Problems    Diagnosis   • *ACS (acute coronary syndrome) (HCC)   • Hypercholesteremia   • History of coronary angioplasty with insertion of stent   • Acquired hypothyroidism   • Essential hypertension       Medical Decision Making and Plan:  ACS s/p FELIPE x3 - presented from OSH with chest pain, s/p stenting on 5/8/20 with Dr. Olivas.  Continue ASA, Brilinta.  -PT and OT for mobility and ADLs  -Follow-up cardiology, PCP     HTN - Patient on Coreg 12.5 mg BID, Lisinopril 15 mg daily. Previously on Amlodipine and Valsartan  -Elevated on Admission, restart Amlodipine 5 mg, into 140s and 130s, will increase Lisinopril to 20 mg. Increase Amlodipine to 10 mg and monitor over weekend. Switch from ACEi to ARB  -Start Lasix 40 mg in AM. Check AM Labs     HLD - Patient on Atorvastatin 80 mg daily     LE edema - Continue to monitor     Cough - New cough over weekend, check CXR 5/18/20 and start on Mucinex   -CXR - wnl. Cough improved on Mucinex, will switch ACEi to ARB    Anemia - mild on admission labs at 11.6, continue to monitor    Hypokalemia - 3.1 on admission. Start K supplement, recheck on 5/18/20 - 3.5   -Starting on Lasix, restart K supplement. Check BMP    Mood - Patient on Remeron 7.5 mg qHS at home.     Vitamin D deficiency - 22 on admission start 1000 U    Obesity, class II - BMI of 35.43 on admission. Dietitian to consult    GI - Start on Omeprazole.      DVT Ppx - Patient on Lovenox on transfer.    Total time:  27 minutes.  I spent greater than 50% of the time for patient care, counseling, and coordination on this date, including unit/floor time, and face-to-face time with the patient as per interval events and assessment and plan above. Topics discussed included ongoing edema, start Lasix, start K supplement and check AM labs.     Ever Longo M.D.

## 2020-05-20 NOTE — THERAPY
Physical Therapy   Daily Treatment     Patient Name: Sena Boyce  Age:  79 y.o., Sex:  female  Medical Record #: [id number]  Today's Date: 5/20/2020     Precautions  Precautions: Fall Risk    Subjective    Pt seated in room, agreeable to PT.      Objective       05/20/20 1301   Precautions   Precautions Fall Risk   Transfer Functional Level of Assist   Toilet Transfers Stand by Assist   Toilet Transfer Description Grab bar;Increased time;Set-up of equipment;Supervision for safety   Neuro-Muscular Treatments   Neuro-Muscular Treatments Sequencing;Verbal Cuing;Postural Changes   Comments Pt completed ladderball activity while standing in // bars with intermittent BUE support, 4 min standing with SBA    Interdisciplinary Plan of Care Collaboration   IDT Collaboration with  Certified Nursing Assistant   Patient Position at End of Therapy Seated  (in restroom with CNA)   Collaboration Comments pt care passed to CNA    PT Total Time Spent   PT Individual Total Time Spent (Mins) 30   PT Charge Group   PT Neuromuscular Re-Education / Balance 1   PT Therapeutic Activities 1     Pt able to perform hygeine after BM with SBA- CGA steadying assist, use of grab bar for balance.     Assessment    Pt limited in standing tolerance by need for BM during this session. Motivated for participation.     Plan    Continue functional mobility training with FWW, trial step/ curb in // bars, LE strength and endurance, balance/ standing tolerance.     Physical Therapy Problems     Problem: Balance     Dates: Start: 05/13/20       Goal: STG-Within one week, patient will maintain static standing     Dates: Start: 05/13/20       Description: 1) Individualized goal:  3 min in // bars with min A   2) Interventions:  PT Group Therapy, PT E Stim Attended, PT Gait Training, PT Therapeutic Exercises, PT Neuro Re-Ed/Balance, PT Therapeutic Activity, and PT Manual Therapy      Note:     Goal Note filed on 05/13/20 5336 by Barbara Michaud, PT    Alessandro  completed 5/13                        Problem: Mobility     Dates: Start: 05/13/20       Description:       Goal: STG-Within one week, patient will propel wheelchair community     Dates: Start: 05/13/20       Description: 1) Individualized goal:  50 ft using UEs and/or LEs, SBA   2) Interventions: PT Group Therapy, PT E Stim Attended, PT Gait Training, PT Therapeutic Exercises, PT Neuro Re-Ed/Balance, PT Therapeutic Activity, and PT Manual Therapy          Note:     Goal Note filed on 05/13/20 1608 by Barbara Michaud, PT    Alessandro completed 5/13                  Goal: STG-Within one week, patient will ambulate household distance     Dates: Start: 05/13/20       Description: 1) Individualized goal:  10 ft in // bars with mod A   2) Interventions: PT Group Therapy, PT E Stim Attended, PT Gait Training, PT Therapeutic Exercises, PT Neuro Re-Ed/Balance, PT Therapeutic Activity, and PT Manual Therapy          Note:     Goal Note filed on 05/13/20 1608 by Barbara Michaud, PT    Alessandro completed 5/13                        Problem: Mobility Transfers     Dates: Start: 05/13/20       Goal: STG-Within one week, patient will transfer bed to chair     Dates: Start: 05/13/20       Description: 1) Individualized goal:  mod A SPT or squat pivot with AD as needed   2) Interventions: PT Group Therapy, PT E Stim Attended, PT Gait Training, PT Therapeutic Exercises, PT Neuro Re-Ed/Balance, PT Therapeutic Activity, and PT Manual Therapy          Note:     Goal Note filed on 05/13/20 1608 by Barbara Michaud, PT    Alessandro completed 5/13                        Problem: PT-Long Term Goals     Dates: Start: 05/13/20       Goal: LTG-By discharge, patient will ambulate     Dates: Start: 05/13/20       Description: 150 ft with LRAD, spv           Goal: LTG-By discharge, patient will transfer one surface to another     Dates: Start: 05/13/20       Description: Spv with LRAD           Goal: LTG-By discharge, patient will ambulate up/down 4-6 stairs     Dates:  Start: 05/13/20       Description: 1) Individualized goal:  2 steps with single HR and CGA   2) Interventions: PT Group Therapy, PT E Stim Attended, PT Gait Training, PT Therapeutic Exercises, PT Neuro Re-Ed/Balance, PT Therapeutic Activity, and PT Manual Therapy                Goal: LTG-By discharge, patient will transfer in/out of a car     Dates: Start: 05/13/20       Description: 1) Individualized goal:  Spv with LRAD   2) Interventions: PT Group Therapy, PT E Stim Attended, PT Gait Training, PT Therapeutic Exercises, PT Neuro Re-Ed/Balance, PT Therapeutic Activity, and PT Manual Therapy

## 2020-05-20 NOTE — DOCUMENTATION QUERY
[facility]                                                                       Query Response Note      PATIENT:               HARSHAL CASILLAS  ACCT #:                  [id number]  MRN:                     [id number]  :                      1940  ADMIT DATE:       2020 3:10 PM  DISCH DATE:        2020 2:56 PM  RESPONDING  PROVIDER #:        [id number]           QUERY TEXT:    Per query sent 5/15, myocardial infarction was ruled in. Can the type of MI be further specified?    NOTE:  If an appropriate response is not listed below, please respond with a new note.  Type 4c]]                                                                                                                                        The patient's Clinical Indicators include:  Query 5/15-Myocardial infarction is ruled in     Discharge Summary-She had single mild spike a fever, likely secondary to MI, with negative infectious disease work-up,  Options provided:   -- NSTEMI   -- Type 2 MI (due to demand ischemia or secondary to ischemic imbalance)   -- Type 3   -- Type 4 a   -- Type 4b   -- Type 5   -- ST elevation (STEMI) (type 1) Anterior   -- ST elevation Inferior   -- STEMI inferoposterior transmural (Q wave)   -- STEMI involving diagonal coronary artery   -- STEMI involving Left circumflex coronary artery   -- STEMI involving Left main coronary artery   -- STEMI involving oblique marginal coronary artery   -- STEMI involving Right  coronary artery   -- STEMI Septal or Lateral (apical-lateral) (basal-lateral) (high)   -- Nontransmural   -- Unable to determine      Query created by: Carey Gaxiola on 2020 12:16 PM    RESPONSE TEXT:    NSTEMI          Electronically signed by:  ARYAN DA SILVA MD 2020 3:06 PM

## 2020-05-20 NOTE — DISCHARGE PLANNING
Rec'd message from daughter, Gali, via phone requesting HH referral to [facility].

## 2020-05-20 NOTE — THERAPY
"Occupational Therapy  Daily Treatment     Patient Name: Sena Boyce  Age:  79 y.o., Sex:  female  Medical Record #: [id number]  Today's Date: 5/20/2020     Precautions  Precautions: Fall Risk    Safety   ADL Safety : Requires Supervision for Safety  Bathroom Safety: Requires Supervision for Safety  Comments: see below notes for ADL performance details.    Subjective    \"My schedule isn't here. I'm all out of sorts. I wasn't expecting you. How will I let me daughter know what I'm doing today without a schedule?\" pt with increased anxiety without schedule. OT ensured schedule was printed and delivered it to the pt.    \"Well I just want to do home things like this. Getting my coffee, going to the bathroom, and getting in and out of my bed. I don't want to be bed bound! But I also can't lay flat. No way can I lay flat.\"     Objective       05/20/20 0831   Vitals   O2 Delivery Device None - Room Air   Non Verbal Descriptors   Non Verbal Scale  Calm   Cognition    Level of Consciousness Alert   Sleep/Wake Cycle   Sleep & Rest Awake;Out of bed   Functional Level of Assist   Eating Independent   Bed, Chair, Wheelchair Transfer Stand by Assist   Bed Chair Wheelchair Transfer Description Verbal cueing;Increased time  (non-hospital bed with bed rail)   Interdisciplinary Plan of Care Collaboration   IDT Collaboration with  Other (See Comments);Nursing  (scheduling)   Patient Position at End of Therapy Seated;Tray Table within Reach;Phone within Reach;Self Releasing Lap Belt Applied;Call Light within Reach   Collaboration Comments Techs re: printing out new schedule RN: passed meds during session   OT Total Time Spent   OT Individual Total Time Spent (Mins) 30   OT Charge Group   OT Self Care / ADL 2     Pt walked 40 feet from w/c in gym to queen bed with bed rail.    Assessment    Pt with anxious affect this morning likely triggered by lack of schedule this morning. Schedule provided. Pt insistent on needing a hospital bed and " w/c on d/c. Pt does better getting in and out of a non-hospital bed, however, reports difficulty breathing in a flat bed. Pt with increased endurance, but still continues to get out of breath with bed transfer.    Plan    ADLs, standing activity tolerance, functional mobility, endurance, balance (expected d/c date 5-)    Occupational Therapy Goals     Problem: Dressing     Dates: Start: 05/13/20       Goal: STG-Within one week, patient will dress UB     Dates: Start: 05/13/20       Description: 1) Individualized Goal:  with supervision  2) Interventions:  OT Group Therapy, OT Self Care/ADL, OT Community Reintegration, OT Manual Ther Technique, OT Neuro Re-Ed/Balance, OT Sensory Int Techniques, OT Therapeutic Activity, OT Evaluation, and OT Therapeutic Exercise      Note:     Goal Note filed on 05/14/20 1127 by Jeanette Figueroa MS,OTR/L    Alessandro completed 5/13                  Goal: STG-Within one week, patient will dress LB     Dates: Start: 05/13/20       Description: 1) Individualized Goal:  with moderate assistance and AE  2) Interventions:  OT Group Therapy, OT Self Care/ADL, OT Community Reintegration, OT Manual Ther Technique, OT Neuro Re-Ed/Balance, OT Sensory Int Techniques, OT Therapeutic Activity, OT Evaluation, and OT Therapeutic Exercise      Note:     Goal Note filed on 05/14/20 1127 by Jeanette Figueroa MS,OTR/L    Alessandro completed 5/13                        Problem: Functional Transfers     Dates: Start: 05/13/20       Goal: STG-Within one week, patient will transfer to toilet     Dates: Start: 05/13/20       Description: 1) Individualized Goal:  with min A  2) Interventions:  OT Group Therapy, OT Self Care/ADL, OT Community Reintegration, OT Manual Ther Technique, OT Neuro Re-Ed/Balance, OT Sensory Int Techniques, OT Therapeutic Activity, OT Evaluation, and OT Therapeutic Exercise      Note:     Goal Note filed on 05/14/20 1127 by Jeanette Figueroa MS,OTR/L    Alessandro completed 5/13                         Problem: OT Long Term Goals     Dates: Start: 05/13/20       Goal: LTG-By discharge, patient will complete basic self care tasks     Dates: Start: 05/13/20       Description: 1) Individualized Goal:  with Supervision  2) Interventions:  OT Group Therapy, OT Self Care/ADL, OT Community Reintegration, OT Manual Ther Technique, OT Neuro Re-Ed/Balance, OT Sensory Int Techniques, OT Therapeutic Activity, OT Evaluation, and OT Therapeutic Exercise            Goal: LTG-By discharge, patient will perform bathroom transfers     Dates: Start: 05/13/20       Description: 1) Individualized Goal:  with supervision  2) Interventions:  OT Group Therapy, OT Self Care/ADL, OT Community Reintegration, OT Manual Ther Technique, OT Neuro Re-Ed/Balance, OT Sensory Int Techniques, OT Therapeutic Activity, OT Evaluation, and OT Therapeutic Exercise                  Problem: Toileting     Dates: Start: 05/13/20       Description: 1) Individualized Goal:  with Min A  2) Interventions:  OT Group Therapy, OT Self Care/ADL, OT Community Reintegration, OT Manual Ther Technique, OT Neuro Re-Ed/Balance, OT Sensory Int Techniques, OT Therapeutic Activity, OT Evaluation, and OT Therapeutic Exercise      Goal: STG-Within one week, patient will complete toileting tasks     Dates: Start: 05/13/20       Description: 1) Individualized Goal:  with min A  2) Interventions:  OT Group Therapy, OT Self Care/ADL, OT Community Reintegration, OT Manual Ther Technique, OT Neuro Re-Ed/Balance, OT Sensory Int Techniques, OT Therapeutic Activity, OT Evaluation, and OT Therapeutic Exercise      Note:     Goal Note filed on 05/14/20 1127 by Jeanette Figueroa MS,OTR/L    Eval completed 5/13

## 2020-05-20 NOTE — THERAPY
"Physical Therapy   Daily Treatment     Patient Name: Sena Boyce  Age:  79 y.o., Sex:  female  Medical Record #: [id number]  Today's Date: 5/20/2020     Precautions  Precautions: Fall Risk    Subjective    \"The doctor is supposed to start Lasix soon, so I hope the swelling will go down.\" Agreeable to PT.     Objective       05/20/20 1031   Precautions   Precautions Fall Risk   Gait Functional Level of Assist    Gait Level Of Assist Stand by Assist   Assistive Device Front Wheel Walker   Distance (Feet) 90   # of Times Distance was Traveled 3   Deviation Bradykinetic;Antalgic;Decreased Heel Strike;Decreased Toe Off   Wheelchair Functional Level of Assist   Wheelchair Assist Supervised   Distance Wheelchair (Feet or Distance) 75   Wheelchair Description Extra time;Supervision for safety   Sitting Lower Body Exercises   Nustep Resistance Level 2  (10 min, 408 steps)   Interdisciplinary Plan of Care Collaboration   Patient Position at End of Therapy Seated;Call Light within Reach;Tray Table within Reach   PT Total Time Spent   PT Individual Total Time Spent (Mins) 60   PT Charge Group   PT Gait Training 1   PT Therapeutic Exercise 1   PT Therapeutic Activities 2     Pt education/ discussion of current short term goals and progress at rehab thus far. Also discussed DC plan and level of help needed at home.     Assessment    Pt with good motivation and participation. Reports she would just like to \"be independent in my home\". Receptive to education regarding goals and progress made.     Plan    Continue functional mobility training with FWW, trial step/ curb in // bars, LE strength and endurance, balance/ standing tolerance.     Physical Therapy Problems     Problem: Balance     Dates: Start: 05/13/20       Goal: STG-Within one week, patient will maintain static standing     Dates: Start: 05/13/20       Description: 1) Individualized goal:  3 min in // bars with min A   2) Interventions:  PT Group Therapy, PT E Stim " Attended, PT Gait Training, PT Therapeutic Exercises, PT Neuro Re-Ed/Balance, PT Therapeutic Activity, and PT Manual Therapy      Note:     Goal Note filed on 05/13/20 1608 by Barbara Michaud, PT    Alessandro completed 5/13                        Problem: Mobility     Dates: Start: 05/13/20       Description:       Goal: STG-Within one week, patient will propel wheelchair community     Dates: Start: 05/13/20       Description: 1) Individualized goal:  50 ft using UEs and/or LEs, SBA   2) Interventions: PT Group Therapy, PT E Stim Attended, PT Gait Training, PT Therapeutic Exercises, PT Neuro Re-Ed/Balance, PT Therapeutic Activity, and PT Manual Therapy          Note:     Goal Note filed on 05/13/20 1608 by Barbara Michaud, PT    Alessandro completed 5/13                  Goal: STG-Within one week, patient will ambulate household distance     Dates: Start: 05/13/20       Description: 1) Individualized goal:  10 ft in // bars with mod A   2) Interventions: PT Group Therapy, PT E Stim Attended, PT Gait Training, PT Therapeutic Exercises, PT Neuro Re-Ed/Balance, PT Therapeutic Activity, and PT Manual Therapy          Note:     Goal Note filed on 05/13/20 1608 by Barbara Michaud, PT    Alessandro completed 5/13                        Problem: Mobility Transfers     Dates: Start: 05/13/20       Goal: STG-Within one week, patient will transfer bed to chair     Dates: Start: 05/13/20       Description: 1) Individualized goal:  mod A SPT or squat pivot with AD as needed   2) Interventions: PT Group Therapy, PT E Stim Attended, PT Gait Training, PT Therapeutic Exercises, PT Neuro Re-Ed/Balance, PT Therapeutic Activity, and PT Manual Therapy          Note:     Goal Note filed on 05/13/20 1608 by Barbara Michaud, PT    Alessandro completed 5/13                        Problem: PT-Long Term Goals     Dates: Start: 05/13/20       Goal: LTG-By discharge, patient will ambulate     Dates: Start: 05/13/20       Description: 150 ft with LRAD, spv           Goal: LTG-By  discharge, patient will transfer one surface to another     Dates: Start: 05/13/20       Description: Spv with LRAD           Goal: LTG-By discharge, patient will ambulate up/down 4-6 stairs     Dates: Start: 05/13/20       Description: 1) Individualized goal:  2 steps with single HR and CGA   2) Interventions: PT Group Therapy, PT E Stim Attended, PT Gait Training, PT Therapeutic Exercises, PT Neuro Re-Ed/Balance, PT Therapeutic Activity, and PT Manual Therapy                Goal: LTG-By discharge, patient will transfer in/out of a car     Dates: Start: 05/13/20       Description: 1) Individualized goal:  Spv with LRAD   2) Interventions: PT Group Therapy, PT E Stim Attended, PT Gait Training, PT Therapeutic Exercises, PT Neuro Re-Ed/Balance, PT Therapeutic Activity, and PT Manual Therapy

## 2020-05-20 NOTE — THERAPY
05/19/20 1559   Precautions   Precautions Fall Risk   Pain 0 - 10 Group   Location Leg   Location Orientation Right;Left;Upper   Therapist Pain Assessment 4;During Activity   Cognition    Level of Consciousness Alert   ABS (Agitated Behavior Scale)   Agitated Behavior Scale Performed No   Gait Functional Level of Assist    Gait Level Of Assist Contact Guard Assist   Assistive Device Front Wheel Walker   Distance (Feet) 55   # of Times Distance was Traveled 2   Sitting Lower Body Exercises   Ankle Pumps 2 sets of 15;Bilateral   Hip Flexion 2 sets of 15;Bilateral  (Assisted)   Hip Abduction 2 sets of 15;Bilateral   Hip Adduction 2 sets of 15;Bilateral   Long Arc Quad 2 sets of 15;Bilateral   Hamstring Curl 2 sets of 15;Bilateral   PT Total Time Spent   PT Individual Total Time Spent (Mins) 60   PT Charge Group   PT Gait Training 1   PT Therapeutic Exercise 2   PT Therapeutic Activities 1   Physical Therapy   Daily Treatment     Patient Name: Sena Boyce  Age:  79 y.o., Sex:  female  Medical Record #: [id number]  Today's Date: 5/19/2020     Precautions  Precautions: Fall Risk    Subjective    The patient was sitting up in her chair and she agreed to PT.  She reported having lower extremity pain when walking.     Objective    The patient practiced sit to/from stand wheelchair/fww.  She was able to participate in gait training with a fww and tolerated 55 FT x2.  She required several minutes of rest after each.  The patient participated in seated bilateral lower extremity therapeutic exercise 2 sets of 15 as indicated above.  She needed slight manual assistance for seated hip flexion.  Otherwise, she was able to do the exercises independently.    Assessment    The patient is pleasant and cooperative as well as motivated to participate in therapy in order to discharge to her home.  Her impaired endurance limits her gait distance to approximately 55 FT.    Plan    Safety education, therapeutic exercise for strength and  endurance, gait training fww as tolerated, transfer training    Physical Therapy Problems     Problem: Balance     Dates: Start: 05/13/20       Goal: STG-Within one week, patient will maintain static standing     Dates: Start: 05/13/20       Description: 1) Individualized goal:  3 min in // bars with min A   2) Interventions:  PT Group Therapy, PT E Stim Attended, PT Gait Training, PT Therapeutic Exercises, PT Neuro Re-Ed/Balance, PT Therapeutic Activity, and PT Manual Therapy      Note:     Goal Note filed on 05/13/20 1608 by Barbara Michaud, PT    Alessandro completed 5/13                        Problem: Mobility     Dates: Start: 05/13/20       Description:       Goal: STG-Within one week, patient will propel wheelchair community     Dates: Start: 05/13/20       Description: 1) Individualized goal:  50 ft using UEs and/or LEs, SBA   2) Interventions: PT Group Therapy, PT E Stim Attended, PT Gait Training, PT Therapeutic Exercises, PT Neuro Re-Ed/Balance, PT Therapeutic Activity, and PT Manual Therapy          Note:     Goal Note filed on 05/13/20 1608 by Barbara Michaud, PT    Alessandro completed 5/13                  Goal: STG-Within one week, patient will ambulate household distance     Dates: Start: 05/13/20       Description: 1) Individualized goal:  10 ft in // bars with mod A   2) Interventions: PT Group Therapy, PT E Stim Attended, PT Gait Training, PT Therapeutic Exercises, PT Neuro Re-Ed/Balance, PT Therapeutic Activity, and PT Manual Therapy          Note:     Goal Note filed on 05/13/20 1608 by Barbara Michaud, PT    Alessandro completed 5/13                        Problem: Mobility Transfers     Dates: Start: 05/13/20       Goal: STG-Within one week, patient will transfer bed to chair     Dates: Start: 05/13/20       Description: 1) Individualized goal:  mod A SPT or squat pivot with AD as needed   2) Interventions: PT Group Therapy, PT E Stim Attended, PT Gait Training, PT Therapeutic Exercises, PT Neuro Re-Ed/Balance, PT  Therapeutic Activity, and PT Manual Therapy          Note:     Goal Note filed on 05/13/20 1608 by Barbara iMchaud, PT    Alessandro completed 5/13                        Problem: PT-Long Term Goals     Dates: Start: 05/13/20       Goal: LTG-By discharge, patient will ambulate     Dates: Start: 05/13/20       Description: 150 ft with LRAD, spv           Goal: LTG-By discharge, patient will transfer one surface to another     Dates: Start: 05/13/20       Description: Spv with LRAD           Goal: LTG-By discharge, patient will ambulate up/down 4-6 stairs     Dates: Start: 05/13/20       Description: 1) Individualized goal:  2 steps with single HR and CGA   2) Interventions: PT Group Therapy, PT E Stim Attended, PT Gait Training, PT Therapeutic Exercises, PT Neuro Re-Ed/Balance, PT Therapeutic Activity, and PT Manual Therapy                Goal: LTG-By discharge, patient will transfer in/out of a car     Dates: Start: 05/13/20       Description: 1) Individualized goal:  Spv with LRAD   2) Interventions: PT Group Therapy, PT E Stim Attended, PT Gait Training, PT Therapeutic Exercises, PT Neuro Re-Ed/Balance, PT Therapeutic Activity, and PT Manual Therapy

## 2020-05-20 NOTE — CARE PLAN
Problem: Safety  Goal: Will remain free from falls  Intervention: Implement fall precautions  Note: Pt appropriately uses call light to make needs known.Fall precautions and frequent rounding in place for safety.Call light within reach.Will continue to monitor and assess needs and safety.     Problem: Pain Management  Goal: Pain level will decrease to patient's comfort goal  Note: Pt is calm, comfortable and no sign of acute distress noted.Repositioned with pillows for comfort.Will continue to monitor and assess pain level and medicate as needed.

## 2020-05-20 NOTE — THERAPY
"Occupational Therapy  Daily Treatment     Patient Name: Sena Boyce  Age:  79 y.o., Sex:  female  Medical Record #: [id number]  Today's Date: 5/20/2020     Precautions  Precautions: Fall Risk    Safety   ADL Safety : Requires Supervision for Safety  Bathroom Safety: Requires Supervision for Safety  Comments: see below notes for ADL performance details.    Subjective    \"Oh no no no, I can't do this!\" Pt stated mid bed transfer when trialed without leg .     Objective       05/20/20 1401   Vitals   O2 Delivery Device None - Room Air   Non Verbal Descriptors   Non Verbal Scale  Calm   Cognition    Orientation Level Oriented x 4   Level of Consciousness Alert   Functional Level of Assist   Bed, Chair, Wheelchair Transfer Minimal Assist   Bed Chair Wheelchair Transfer Description Assist with one limb;Supervision for safety;Verbal cueing   Sitting Upper Body Exercises   Bilateral Row 3 sets of 15  (20 lbs)   Balance   Sitting Balance (Static) Fair   Sitting Balance (Dynamic) Fair   Standing Balance (Static) Fair -   Standing Balance (Dynamic) Poor +   Comments walked 40 feet to and from bed transfers with FWW and CGA,   Interdisciplinary Plan of Care Collaboration   IDT Collaboration with  Physical Therapist   Patient Position at End of Therapy Seated;Self Releasing Lap Belt Applied;Call Light within Reach;Tray Table within Reach;Phone within Reach   Collaboration Comments re: CLOF   OT Total Time Spent   OT Individual Total Time Spent (Mins) 60   OT Charge Group   OT Self Care / ADL 2   OT Therapeutic Exercise  2       Assessment    Pt with anxiety impacting bed mobility. Trialed non-hospital bed without leg , however, pt became anxious correction through and would not continue requiring min A. Issued pt leg  for continued practice.    Plan    Continue bed transfers to gain confidence, ADLs, endurance, strength, balance,     Occupational Therapy Goals     Problem: Dressing     Dates: Start: 05/13/20    "    Goal: STG-Within one week, patient will dress UB     Dates: Start: 05/13/20       Description: 1) Individualized Goal:  with supervision  2) Interventions:  OT Group Therapy, OT Self Care/ADL, OT Community Reintegration, OT Manual Ther Technique, OT Neuro Re-Ed/Balance, OT Sensory Int Techniques, OT Therapeutic Activity, OT Evaluation, and OT Therapeutic Exercise      Note:     Goal Note filed on 05/14/20 1127 by Jeanette Figueroa MS,OTR/L    Eval completed 5/13                  Goal: STG-Within one week, patient will dress LB     Dates: Start: 05/13/20       Description: 1) Individualized Goal:  with moderate assistance and AE  2) Interventions:  OT Group Therapy, OT Self Care/ADL, OT Community Reintegration, OT Manual Ther Technique, OT Neuro Re-Ed/Balance, OT Sensory Int Techniques, OT Therapeutic Activity, OT Evaluation, and OT Therapeutic Exercise      Note:     Goal Note filed on 05/14/20 1127 by Jeanette Figueroa MS,OTR/L    Alessandro completed 5/13                        Problem: Functional Transfers     Dates: Start: 05/13/20       Goal: STG-Within one week, patient will transfer to toilet     Dates: Start: 05/13/20       Description: 1) Individualized Goal:  with min A  2) Interventions:  OT Group Therapy, OT Self Care/ADL, OT Community Reintegration, OT Manual Ther Technique, OT Neuro Re-Ed/Balance, OT Sensory Int Techniques, OT Therapeutic Activity, OT Evaluation, and OT Therapeutic Exercise      Note:     Goal Note filed on 05/14/20 1127 by Jeanette Figueroa MS,OTR/L    Eval completed 5/13                        Problem: OT Long Term Goals     Dates: Start: 05/13/20       Goal: LTG-By discharge, patient will complete basic self care tasks     Dates: Start: 05/13/20       Description: 1) Individualized Goal:  with Supervision  2) Interventions:  OT Group Therapy, OT Self Care/ADL, OT Community Reintegration, OT Manual Ther Technique, OT Neuro Re-Ed/Balance, OT Sensory Int Techniques, OT Therapeutic  Activity, OT Evaluation, and OT Therapeutic Exercise            Goal: LTG-By discharge, patient will perform bathroom transfers     Dates: Start: 05/13/20       Description: 1) Individualized Goal:  with supervision  2) Interventions:  OT Group Therapy, OT Self Care/ADL, OT Community Reintegration, OT Manual Ther Technique, OT Neuro Re-Ed/Balance, OT Sensory Int Techniques, OT Therapeutic Activity, OT Evaluation, and OT Therapeutic Exercise                  Problem: Toileting     Dates: Start: 05/13/20       Description: 1) Individualized Goal:  with Min A  2) Interventions:  OT Group Therapy, OT Self Care/ADL, OT Community Reintegration, OT Manual Ther Technique, OT Neuro Re-Ed/Balance, OT Sensory Int Techniques, OT Therapeutic Activity, OT Evaluation, and OT Therapeutic Exercise      Goal: STG-Within one week, patient will complete toileting tasks     Dates: Start: 05/13/20       Description: 1) Individualized Goal:  with min A  2) Interventions:  OT Group Therapy, OT Self Care/ADL, OT Community Reintegration, OT Manual Ther Technique, OT Neuro Re-Ed/Balance, OT Sensory Int Techniques, OT Therapeutic Activity, OT Evaluation, and OT Therapeutic Exercise      Note:     Goal Note filed on 05/14/20 1127 by Jeanette Figueroa MS,OTR/L    Alessandro completed 5/13

## 2020-05-21 LAB
ANION GAP SERPL CALC-SCNC: 13 MMOL/L (ref 7–16)
BUN SERPL-MCNC: 22 MG/DL (ref 8–22)
CALCIUM SERPL-MCNC: 9.3 MG/DL (ref 8.5–10.5)
CHLORIDE SERPL-SCNC: 104 MMOL/L (ref 96–112)
CO2 SERPL-SCNC: 20 MMOL/L (ref 20–33)
CREAT SERPL-MCNC: 1.13 MG/DL (ref 0.5–1.4)
GLUCOSE SERPL-MCNC: 105 MG/DL (ref 65–99)
POTASSIUM SERPL-SCNC: 3.5 MMOL/L (ref 3.6–5.5)
SODIUM SERPL-SCNC: 137 MMOL/L (ref 135–145)

## 2020-05-21 PROCEDURE — 770010 HCHG ROOM/CARE - REHAB SEMI PRIVAT*

## 2020-05-21 PROCEDURE — A9270 NON-COVERED ITEM OR SERVICE: HCPCS | Performed by: PHYSICAL MEDICINE & REHABILITATION

## 2020-05-21 PROCEDURE — 97535 SELF CARE MNGMENT TRAINING: CPT

## 2020-05-21 PROCEDURE — 99233 SBSQ HOSP IP/OBS HIGH 50: CPT | Performed by: PHYSICAL MEDICINE & REHABILITATION

## 2020-05-21 PROCEDURE — 97112 NEUROMUSCULAR REEDUCATION: CPT | Mod: CQ

## 2020-05-21 PROCEDURE — 700102 HCHG RX REV CODE 250 W/ 637 OVERRIDE(OP): Performed by: PHYSICAL MEDICINE & REHABILITATION

## 2020-05-21 PROCEDURE — 700111 HCHG RX REV CODE 636 W/ 250 OVERRIDE (IP): Performed by: PHYSICAL MEDICINE & REHABILITATION

## 2020-05-21 PROCEDURE — 97110 THERAPEUTIC EXERCISES: CPT | Mod: CQ

## 2020-05-21 PROCEDURE — 36415 COLL VENOUS BLD VENIPUNCTURE: CPT

## 2020-05-21 PROCEDURE — 97116 GAIT TRAINING THERAPY: CPT | Mod: CQ

## 2020-05-21 PROCEDURE — 80048 BASIC METABOLIC PNL TOTAL CA: CPT

## 2020-05-21 RX ORDER — FUROSEMIDE 40 MG/1
40 TABLET ORAL
Status: DISCONTINUED | OUTPATIENT
Start: 2020-05-22 | End: 2020-05-26 | Stop reason: HOSPADM

## 2020-05-21 RX ORDER — OXYMETAZOLINE HYDROCHLORIDE 0.05 G/100ML
2 SPRAY NASAL 2 TIMES DAILY PRN
Status: DISPENSED | OUTPATIENT
Start: 2020-05-21 | End: 2020-05-24

## 2020-05-21 RX ADMIN — FUROSEMIDE 40 MG: 40 TABLET ORAL at 08:25

## 2020-05-21 RX ADMIN — ATORVASTATIN CALCIUM 80 MG: 40 TABLET, FILM COATED ORAL at 20:32

## 2020-05-21 RX ADMIN — CARVEDILOL 12.5 MG: 12.5 TABLET, FILM COATED ORAL at 17:43

## 2020-05-21 RX ADMIN — GUAIFENESIN 600 MG: 600 TABLET, EXTENDED RELEASE ORAL at 20:33

## 2020-05-21 RX ADMIN — NYSTATIN: 100000 POWDER TOPICAL at 08:31

## 2020-05-21 RX ADMIN — AMLODIPINE BESYLATE 10 MG: 5 TABLET ORAL at 08:24

## 2020-05-21 RX ADMIN — ASPIRIN 81 MG: 81 TABLET, COATED ORAL at 08:25

## 2020-05-21 RX ADMIN — MIRTAZAPINE 7.5 MG: 15 TABLET, ORALLY DISINTEGRATING ORAL at 20:31

## 2020-05-21 RX ADMIN — TICAGRELOR 90 MG: 90 TABLET ORAL at 08:24

## 2020-05-21 RX ADMIN — TICAGRELOR 90 MG: 90 TABLET ORAL at 20:33

## 2020-05-21 RX ADMIN — CARVEDILOL 12.5 MG: 12.5 TABLET, FILM COATED ORAL at 08:24

## 2020-05-21 RX ADMIN — GUAIFENESIN 600 MG: 600 TABLET, EXTENDED RELEASE ORAL at 08:24

## 2020-05-21 RX ADMIN — LOSARTAN POTASSIUM 50 MG: 25 TABLET, FILM COATED ORAL at 05:19

## 2020-05-21 RX ADMIN — OMEPRAZOLE 20 MG: 20 CAPSULE, DELAYED RELEASE ORAL at 08:24

## 2020-05-21 RX ADMIN — ENOXAPARIN SODIUM 40 MG: 100 INJECTION SUBCUTANEOUS at 08:24

## 2020-05-21 RX ADMIN — LEVOTHYROXINE SODIUM 75 MCG: 75 TABLET ORAL at 05:19

## 2020-05-21 RX ADMIN — DOCUSATE SODIUM 50 MG AND SENNOSIDES 8.6 MG 2 TABLET: 8.6; 5 TABLET, FILM COATED ORAL at 20:33

## 2020-05-21 RX ADMIN — BENZOCAINE AND MENTHOL 1 LOZENGE: 15; 3.6 LOZENGE ORAL at 20:36

## 2020-05-21 RX ADMIN — POTASSIUM CHLORIDE 20 MEQ: 1500 TABLET, EXTENDED RELEASE ORAL at 08:25

## 2020-05-21 RX ADMIN — OXYMETAZOLINE HYDROCHLORIDE 2 SPRAY: 5 SPRAY NASAL at 11:30

## 2020-05-21 ASSESSMENT — GAIT ASSESSMENTS
ASSISTIVE DEVICE: FRONT WHEEL WALKER
GAIT LEVEL OF ASSIST: SUPERVISED

## 2020-05-21 ASSESSMENT — ACTIVITIES OF DAILY LIVING (ADL)
TOILET_TRANSFER_DESCRIPTION: GRAB BAR;SUPERVISION FOR SAFETY
TOILETING_LEVEL_OF_ASSIST_DESCRIPTION: GRAB BAR;INCREASED TIME
BED_CHAIR_WHEELCHAIR_TRANSFER_DESCRIPTION: OTHER (COMMENT)
TOILET_TRANSFER_DESCRIPTION: GRAB BAR;INCREASED TIME
TUB_SHOWER_TRANSFER_DESCRIPTION: SHOWER BENCH;GRAB BAR;INCREASED TIME

## 2020-05-21 ASSESSMENT — PATIENT HEALTH QUESTIONNAIRE - PHQ9
SUM OF ALL RESPONSES TO PHQ9 QUESTIONS 1 AND 2: 0
2. FEELING DOWN, DEPRESSED, IRRITABLE, OR HOPELESS: NOT AT ALL
1. LITTLE INTEREST OR PLEASURE IN DOING THINGS: NOT AT ALL

## 2020-05-21 NOTE — THERAPY
Missed Therapy     Patient Name: Sena Boyce  Age:  79 y.o., Sex:  female  Medical Record #: [id number]  Today's Date: 5/21/2020    Discussed missed therapy with RN, PTA, physician, therapy .        05/21/20 0931   Precautions   Precautions Fall Risk   Interdisciplinary Plan of Care Collaboration   IDT Collaboration with  Nursing;Physician;Physical Therapist Assistant (PTA)   Patient Position at End of Therapy Seated;Self Releasing Lap Belt Applied;Call Light within Reach;Tray Table within Reach;Phone within Reach   Collaboration Comments CLOF, POC; pt with bloody nose   Therapy Missed   Missed Therapy (Minutes) 60   Reason For Missed Therapy Medical - Patient on Hold from Therapy;Medical - Other (Please Comment)  (pt with bloody nose, possible make up this PM, hold per MD.)

## 2020-05-21 NOTE — PROGRESS NOTES
"Rehab Progress Note     Encounter Date: 5/21/2020    CC: decreased mobility, fatigue    Interval Events (Subjective)  Patient sitting up in room. Notified by nursing of nose bleed. She reports she has never had a nose bleed before. She reports there were little clots coming out as well. Discussed most likely due to dry membranes, given Afrin and bleeding stopped. Discussed using the NS spray as the oxygen she was previously on may have dried out her nares.  Otherwise newly on Brilinta.  Discussed need to continue. Discussed we would have IDT today and follow-up on discharge planning. Denies NVD. Denies SOB.     IDT Team Meeting 5/21/2020    IEver M.D., was present and led the interdisciplinary team conference on 5/21/2020.  I led the IDT conference and agree with the IDT conference documentation and plan of care as noted below.     RN:  Diet Regular   % Meal     Pain None   Sleep    Bowel Continent   Bladder Continent   In's & Out's    Nose bleed this AM    PT:  Bed Mobility    Transfers    Mobility CGA limited by endurance FWW   Stairs    Will need to buy their own WC    OT:  Eating    Grooming    Bathing    UB Dressing    LB Dressing CGA   Toileting    Shower & Transfer CGA     CM:  Continues to work on disposition and DME needs.      DC/Disposition:  5/26/20    Objective:  VITAL SIGNS: /77   Pulse 69   Temp 36.6 °C (97.8 °F) (Temporal)   Resp 18   Ht 1.6 m (5' 3\")   Wt 90.7 kg (200 lb)   SpO2 97%   BMI 35.43 kg/m²   Gen: NAD  Psych: Mood and affect appropriate  CV: RRR, 2+ edema  Resp: CTAB, no upper airway sounds  Abd: NTND  Neuro: AOx4, pushing wheelchair BUE  Unchanged from 5/20/20    Recent Results (from the past 72 hour(s))   Basic Metabolic Panel    Collection Time: 05/21/20  5:32 AM   Result Value Ref Range    Sodium 137 135 - 145 mmol/L    Potassium 3.5 (L) 3.6 - 5.5 mmol/L    Chloride 104 96 - 112 mmol/L    Co2 20 20 - 33 mmol/L    Glucose 105 (H) 65 - 99 mg/dL    Bun " 22 8 - 22 mg/dL    Creatinine 1.13 0.50 - 1.40 mg/dL    Calcium 9.3 8.5 - 10.5 mg/dL    Anion Gap 13.0 7.0 - 16.0   ESTIMATED GFR    Collection Time: 05/21/20  5:32 AM   Result Value Ref Range    GFR If  56 (A) >60 mL/min/1.73 m 2    GFR If Non  46 (A) >60 mL/min/1.73 m 2       Current Facility-Administered Medications   Medication Frequency   • oxymetazoline (AFRIN) 0.05 % nasal spray 2 Spray BID PRN   • furosemide (LASIX) tablet 40 mg DAILY   • potassium chloride SA (Kdur) tablet 20 mEq DAILY   • losartan (COZAAR) tablet 50 mg Q DAY   • guaiFENesin ER (MUCINEX) tablet 600 mg Q12HRS   • amLODIPine (NORVASC) tablet 10 mg DAILY   • guaiFENesin (ROBITUSSIN) 100 MG/5ML solution 200 mg Q4HRS PRN   • nystatin (MYCOSTATIN) powder BID   • Respiratory Therapy Consult Continuous RT   • oxyCODONE immediate-release (ROXICODONE) tablet 5 mg Q3HRS PRN   • oxyCODONE immediate release (ROXICODONE) tablet 10 mg Q3HRS PRN   • tramadol (ULTRAM) 50 MG tablet 50 mg Q4HRS PRN   • hydrALAZINE (APRESOLINE) tablet 25 mg Q8HRS PRN   • acetaminophen (TYLENOL) tablet 650 mg Q4HRS PRN   • senna-docusate (PERICOLACE or SENOKOT S) 8.6-50 MG per tablet 2 Tab BID    And   • polyethylene glycol/lytes (MIRALAX) PACKET 1 Packet QDAY PRN    And   • magnesium hydroxide (MILK OF MAGNESIA) suspension 30 mL QDAY PRN    And   • bisacodyl (DULCOLAX) suppository 10 mg QDAY PRN   • artificial tears ophthalmic solution 1 Drop PRN   • benzocaine-menthol (CEPACOL) lozenge 1 Lozenge Q2HRS PRN   • mag hydrox-al hydrox-simeth (MAALOX PLUS ES or MYLANTA DS) suspension 20 mL Q2HRS PRN   • ondansetron (ZOFRAN ODT) dispertab 4 mg 4X/DAY PRN    Or   • ondansetron (ZOFRAN) syringe/vial injection 4 mg 4X/DAY PRN   • traZODone (DESYREL) tablet 50 mg QHS PRN   • sodium chloride (OCEAN) 0.65 % nasal spray 2 Spray PRN   • enoxaparin (LOVENOX) inj 40 mg DAILY   • ticagrelor (BRILINTA) tablet 90 mg BID   • aspirin EC (ECOTRIN) tablet 81 mg DAILY    • atorvastatin (LIPITOR) tablet 80 mg Nightly   • carvedilol (COREG) tablet 12.5 mg BID WITH MEALS   • levothyroxine (SYNTHROID) tablet 75 mcg AM ES   • mirtazapine (REMERON) orally disintegrating tab 7.5 mg QHS   • omeprazole (PRILOSEC) capsule 20 mg DAILY       Orders Placed This Encounter   Procedures   • Diet Order Cardiac     Standing Status:   Standing     Number of Occurrences:   1     Order Specific Question:   Diet:     Answer:   Cardiac [6]       Assessment:  Active Hospital Problems    Diagnosis   • *ACS (acute coronary syndrome) (HCC)   • Hypercholesteremia   • History of coronary angioplasty with insertion of stent   • Acquired hypothyroidism   • Essential hypertension       Medical Decision Making and Plan:  ACS s/p FELIPE x3 - presented from OSH with chest pain, s/p stenting on 5/8/20 with Dr. Olivas. Continue ASA, Brilinta.  -PT and OT for mobility and ADLs  -Follow-up cardiology, PCP     HTN - Patient on Coreg 12.5 mg BID, Lisinopril 15 mg daily. Previously on Amlodipine and Valsartan. TTE with EF of 65 and grade 2 diastolic dysfunction  -Elevated on Admission, restart Amlodipine 5 mg, into 140s and 130s, will increase Lisinopril to 20 mg. Increase Amlodipine to 10 mg and monitor over weekend. Switch from ACEi to ARB  -Start Lasix 40 mg in AM. Check AM Labs - K 3.5. Increase to BID  -Patient unable to lie flat without desaturating due to diastolic dysfunction, needs hospital bed for increased HOB.     HLD - Patient on Atorvastatin 80 mg daily     LE edema - Continue to monitor  -Started on Lasix increase to twice a day     Cough - New cough over weekend, check CXR 5/18/20 and start on Mucinex   -CXR - wnl. Cough improved on Mucinex, will switch ACEi to ARB    Anemia - mild on admission labs at 11.6, continue to monitor    Hypokalemia - 3.1 on admission. Start K supplement, recheck on 5/18/20 - 3.5   -Starting on Lasix, restart K supplement. Check BMP - 3.5, stable    Mood - Patient on Remeron 7.5  mg qHS at home.     Vitamin D deficiency - 22 on admission start 1000 U    Obesity, class II - BMI of 35.43 on admission. Dietitian to consult    GI - Start on Omeprazole.      DVT Ppx - Patient on Lovenox on transfer.    Total time:  36 minutes.  I spent greater than 50% of the time for patient care, counseling, and coordination on this date, including unit/floor time, and face-to-face time with the patient as per interval events and assessment and plan above. Topics discussed included discharge planning, ongoing leg swelling, increase Lasix, and monitor electrolyte/Cr. Patient was discussed separately in IDT today; please see details above.    Ever Longo M.D.

## 2020-05-21 NOTE — THERAPY
Missed Therapy     Patient Name: Sena Boyce  Age:  79 y.o., Sex:  female  Medical Record #: [id number]  Today's Date: 5/21/2020    Discussed missed therapy with Physician an RN, medical hold sought as pt is unable to participate this am. Additional collaboration with treating OT.       05/21/20 0885   Interdisciplinary Plan of Care Collaboration   IDT Collaboration with  Nursing;Occupational Therapist;Physician   Patient Position at End of Therapy Seated;Self Releasing Lap Belt Applied   Collaboration Comments notified RN/MD pt with bloody nose-req for hold   Therapy Missed   Missed Therapy (Minutes) 30  (pt with bloody nose, unable to participate)   Reason For Missed Therapy Medical - Patient on Hold from Therapy;Medical - Other (Please Comment)  (bloody nose, medical hold/attempt to mu if able)   PT Charge Group   Supervising Physical Therapist Barbara Michaud

## 2020-05-21 NOTE — DISCHARGE PLANNING
DME referral sent to [facility].  HH referral sent to [facility] per choice form.  Awaiting response.

## 2020-05-21 NOTE — THERAPY
"Occupational Therapy  Daily Treatment     Patient Name: Sena Boyce  Age:  79 y.o., Sex:  female  Medical Record #: [id number]  Today's Date: 5/21/2020     Precautions  Precautions: Fall Risk    Safety   ADL Safety : Requires Supervision for Safety  Bathroom Safety: Requires Supervision for Safety  Comments: see below notes for ADL performance details.    Subjective    \"I can't do it! Oh no! I'm going to fall!\" pt stated jail through bed transfer on hospital bed. Pt was at no risk of falling when she became anxious.     Objective       05/21/20 1401   Vitals   O2 Delivery Device None - Room Air   Non Verbal Descriptors   Non Verbal Scale  Calm   Sleep/Wake Cycle   Sleep & Rest Awake;Out of bed   Functional Level of Assist   Bed, Chair, Wheelchair Transfer Minimal Assist   Bed Chair Wheelchair Transfer Description Assist with one limb;Increased time;Supervision for safety;Verbal cueing  (leg ; assist needed due to pt high anxiety; did x2)   Balance   Comments walked 70 feet from gym towards bedroom. Limited by low back pain.   Bed Mobility    Supine to Sit Supervised   Sit to Supine Minimal Assist   Interdisciplinary Plan of Care Collaboration   IDT Collaboration with  ;Physician;Physical Therapist;Nursing   Patient Position at End of Therapy Seated;Call Light within Reach;Tray Table within Reach;Phone within Reach;Self Releasing Lap Belt Applied   Collaboration Comments re: CLOF at IDT; RN re: short nose bleed during session.   OT Total Time Spent   OT Individual Total Time Spent (Mins) 30   OT Charge Group   OT Self Care / ADL 2     Discussed information from IDT meeting with pt including requesting hospital bed, but not requesting w/c. Pt was unsure she still wanted a hospital bed, but then shared that her bed at home is 5 inches higher than non-hospital bed we had been practicing on. Pt would be unable to get into bed and is still concerned about her breathing.     Assessment    Hospital " "bed transfer practiced x2. First transfer was limited by pt anxiety and fear of fall despite pt being in a safe, supported position. Discussed with pt how fear of falling can actually increase the chance of falling if the fear/anxiety is too high that she begins to panic as she did today. Established a \"pep-talk\" she could begin using prior to transfers. Second transfer was supervision level with a leg  in hospital bed. Pt had bloody nose that was short in duration (about a minute) and was willing to participate afterwards. RN notified.    Plan    Hospital bed transfers, family training (likely Saturday or Monday), confidence building with transfers, endurance, functional mobility.    Occupational Therapy Goals     Problem: Dressing     Dates: Start: 05/13/20       Goal: STG-Within one week, patient will dress UB     Dates: Start: 05/13/20       Description: 1) Individualized Goal:  with supervision  2) Interventions:  OT Group Therapy, OT Self Care/ADL, OT Community Reintegration, OT Manual Ther Technique, OT Neuro Re-Ed/Balance, OT Sensory Int Techniques, OT Therapeutic Activity, OT Evaluation, and OT Therapeutic Exercise      Note:     Goal Note filed on 05/21/20 1226 by Jeanette Figueroa MS,OTR/L    Pt is able to don shrit with supervision, but needs min A for open front sweater.                        Problem: OT Long Term Goals     Dates: Start: 05/13/20       Goal: LTG-By discharge, patient will complete basic self care tasks     Dates: Start: 05/13/20       Description: 1) Individualized Goal:  with Supervision  2) Interventions:  OT Group Therapy, OT Self Care/ADL, OT Community Reintegration, OT Manual Ther Technique, OT Neuro Re-Ed/Balance, OT Sensory Int Techniques, OT Therapeutic Activity, OT Evaluation, and OT Therapeutic Exercise            Goal: LTG-By discharge, patient will perform bathroom transfers     Dates: Start: 05/13/20       Description: 1) Individualized Goal:  with supervision  2) " Interventions:  OT Group Therapy, OT Self Care/ADL, OT Community Reintegration, OT Manual Ther Technique, OT Neuro Re-Ed/Balance, OT Sensory Int Techniques, OT Therapeutic Activity, OT Evaluation, and OT Therapeutic Exercise

## 2020-05-21 NOTE — CARE PLAN
Problem: Dressing  Goal: STG-Within one week, patient will dress UB  Description: 1) Individualized Goal:  with supervision  2) Interventions:  OT Group Therapy, OT Self Care/ADL, OT Community Reintegration, OT Manual Ther Technique, OT Neuro Re-Ed/Balance, OT Sensory Int Techniques, OT Therapeutic Activity, OT Evaluation, and OT Therapeutic Exercise    Outcome: NOT MET  Note: Pt is able to don shrit with supervision, but needs min A for open front sweater.     Problem: Dressing  Goal: STG-Within one week, patient will dress LB  Description: 1) Individualized Goal:  with moderate assistance and AE  2) Interventions:  OT Group Therapy, OT Self Care/ADL, OT Community Reintegration, OT Manual Ther Technique, OT Neuro Re-Ed/Balance, OT Sensory Int Techniques, OT Therapeutic Activity, OT Evaluation, and OT Therapeutic Exercise    Outcome: MET     Problem: Toileting  Goal: STG-Within one week, patient will complete toileting tasks  Description: 1) Individualized Goal:  with min A  2) Interventions:  OT Group Therapy, OT Self Care/ADL, OT Community Reintegration, OT Manual Ther Technique, OT Neuro Re-Ed/Balance, OT Sensory Int Techniques, OT Therapeutic Activity, OT Evaluation, and OT Therapeutic Exercise    Outcome: MET     Problem: Functional Transfers  Goal: STG-Within one week, patient will transfer to toilet  Description: 1) Individualized Goal:  with min A  2) Interventions:  OT Group Therapy, OT Self Care/ADL, OT Community Reintegration, OT Manual Ther Technique, OT Neuro Re-Ed/Balance, OT Sensory Int Techniques, OT Therapeutic Activity, OT Evaluation, and OT Therapeutic Exercise    Outcome: MET

## 2020-05-21 NOTE — CARE PLAN
Problem: Balance  Goal: STG-Within one week, patient will maintain static standing  Description: 1) Individualized goal:  3 min in // bars with min A   2) Interventions:  PT Group Therapy, PT E Stim Attended, PT Gait Training, PT Therapeutic Exercises, PT Neuro Re-Ed/Balance, PT Therapeutic Activity, and PT Manual Therapy    Outcome: MET     Problem: Mobility  Goal: STG-Within one week, patient will propel wheelchair community  Description: 1) Individualized goal:  50 ft using UEs and/or LEs, SBA   2) Interventions: PT Group Therapy, PT E Stim Attended, PT Gait Training, PT Therapeutic Exercises, PT Neuro Re-Ed/Balance, PT Therapeutic Activity, and PT Manual Therapy        Outcome: MET  Goal: STG-Within one week, patient will ambulate household distance  Description: 1) Individualized goal:  10 ft in // bars with mod A   2) Interventions: PT Group Therapy, PT E Stim Attended, PT Gait Training, PT Therapeutic Exercises, PT Neuro Re-Ed/Balance, PT Therapeutic Activity, and PT Manual Therapy        Outcome: MET     Problem: Mobility Transfers  Goal: STG-Within one week, patient will transfer bed to chair  Description: 1) Individualized goal:  mod A SPT or squat pivot with AD as needed   2) Interventions: PT Group Therapy, PT E Stim Attended, PT Gait Training, PT Therapeutic Exercises, PT Neuro Re-Ed/Balance, PT Therapeutic Activity, and PT Manual Therapy        Outcome: MET

## 2020-05-21 NOTE — CARE PLAN
Problem: Safety  Goal: Will remain free from injury  Description: Pt uses call light consistently and appropriately. Waits for assistance does not attempt self transfer this shift. Able to verbalize needs.   Outcome: PROGRESSING AS EXPECTED     Problem: Bowel/Gastric:  Goal: Normal bowel function is maintained or improved  Outcome: PROGRESSING AS EXPECTED      Pt refused scheduled senna.Continent of bowel.LBM 05/20.Will continue to monitor.  Problem: Pain Management  Goal: Pain level will decrease to patient's comfort goal  Outcome: PROGRESSING AS EXPECTED

## 2020-05-21 NOTE — THERAPY
Physical Therapy   Daily Treatment     Patient Name: Sena Boyce  Age:  79 y.o., Sex:  female  Medical Record #: [id number]  Today's Date: 5/21/2020     Precautions  Precautions: (P) Fall Risk    Subjective    Pt is feeling better this afternoon, reports B LE knee discomfort(arthritic)     Objective       05/21/20 1301   Precautions   Precautions Fall Risk   Pain 0 - 10 Group   Location Foot   Location Orientation Right;Left   Description Constant;Aching   Comfort Goal Comfort with Movement;Perform Activity;Comfort at Rest   Therapist Pain Assessment 2;During Activity   Gait Functional Level of Assist    Gait Level Of Assist Supervised  (SPV/SBA)   Assistive Device Front Wheel Walker  (110' + 90')   Distance (Feet)   (90' 110')   # of Times Distance was Traveled 2   Deviation Antalgic;Increased Base Of Support;Bradykinetic;Decreased Heel Strike;Decreased Toe Off;Step To   Transfer Functional Level of Assist   Toilet Transfers Stand by Assist   Toilet Transfer Description Grab bar;Supervision for safety   Sitting Lower Body Exercises   Nustep Resistance Level 2;Time (See Comments)  (10' B LE/UE)   Neuro-Muscular Treatments   Neuro-Muscular Treatments Sequencing   Comments STS transfer training from, wc <> FWW x 5 trials with emphais on hand placement   Interdisciplinary Plan of Care Collaboration   IDT Collaboration with  Occupational Therapist   Patient Position at End of Therapy Seated;Self Releasing Lap Belt Applied   Collaboration Comments CLOF/location in gym   PT Total Time Spent   PT Individual Total Time Spent (Mins) 60   PT Charge Group   PT Gait Training 2   PT Therapeutic Exercise 1   PT Neuromuscular Re-Education / Balance 1   Supervising Physical Therapist Barbara Michaud       Assessment    Pt tolerated treatment session well with emphasis on functional mobility and LE exercise. The patient amb 90' with an additional 110', antalgic pattern, step to and forward flexed posture. Pt is primarily limited be  decreased endurance and B knee pain that she states is chronic as well as fear of falling      Plan    Continue functional mobility training with FWW, trial step/ curb in // bars, LE strength and endurance, balance/ standing tolerance.    Physical Therapy Problems     Problem: PT-Long Term Goals     Dates: Start: 05/13/20       Goal: LTG-By discharge, patient will ambulate     Dates: Start: 05/13/20       Description: 150 ft with LRAD, spv           Goal: LTG-By discharge, patient will transfer one surface to another     Dates: Start: 05/13/20       Description: Spv with LRAD           Goal: LTG-By discharge, patient will ambulate up/down 4-6 stairs     Dates: Start: 05/13/20       Description: 1) Individualized goal:  2 steps with single HR and CGA   2) Interventions: PT Group Therapy, PT E Stim Attended, PT Gait Training, PT Therapeutic Exercises, PT Neuro Re-Ed/Balance, PT Therapeutic Activity, and PT Manual Therapy                Goal: LTG-By discharge, patient will transfer in/out of a car     Dates: Start: 05/13/20       Description: 1) Individualized goal:  Spv with LRAD   2) Interventions: PT Group Therapy, PT E Stim Attended, PT Gait Training, PT Therapeutic Exercises, PT Neuro Re-Ed/Balance, PT Therapeutic Activity, and PT Manual Therapy

## 2020-05-22 PROCEDURE — 770010 HCHG ROOM/CARE - REHAB SEMI PRIVAT*

## 2020-05-22 PROCEDURE — 97535 SELF CARE MNGMENT TRAINING: CPT

## 2020-05-22 PROCEDURE — 700102 HCHG RX REV CODE 250 W/ 637 OVERRIDE(OP): Performed by: PHYSICAL MEDICINE & REHABILITATION

## 2020-05-22 PROCEDURE — A9270 NON-COVERED ITEM OR SERVICE: HCPCS | Performed by: PHYSICAL MEDICINE & REHABILITATION

## 2020-05-22 PROCEDURE — 97116 GAIT TRAINING THERAPY: CPT | Mod: CQ

## 2020-05-22 PROCEDURE — 97110 THERAPEUTIC EXERCISES: CPT

## 2020-05-22 PROCEDURE — 97530 THERAPEUTIC ACTIVITIES: CPT | Mod: CQ

## 2020-05-22 PROCEDURE — 700111 HCHG RX REV CODE 636 W/ 250 OVERRIDE (IP): Performed by: PHYSICAL MEDICINE & REHABILITATION

## 2020-05-22 PROCEDURE — 99232 SBSQ HOSP IP/OBS MODERATE 35: CPT | Performed by: PHYSICAL MEDICINE & REHABILITATION

## 2020-05-22 PROCEDURE — 97110 THERAPEUTIC EXERCISES: CPT | Mod: CQ

## 2020-05-22 RX ORDER — GUAIFENESIN 600 MG/1
600 TABLET, EXTENDED RELEASE ORAL EVERY 12 HOURS PRN
Status: DISCONTINUED | OUTPATIENT
Start: 2020-05-22 | End: 2020-05-26 | Stop reason: HOSPADM

## 2020-05-22 RX ADMIN — TICAGRELOR 90 MG: 90 TABLET ORAL at 21:02

## 2020-05-22 RX ADMIN — LOSARTAN POTASSIUM 50 MG: 25 TABLET, FILM COATED ORAL at 06:30

## 2020-05-22 RX ADMIN — POTASSIUM CHLORIDE 20 MEQ: 1500 TABLET, EXTENDED RELEASE ORAL at 08:28

## 2020-05-22 RX ADMIN — FUROSEMIDE 40 MG: 40 TABLET ORAL at 14:10

## 2020-05-22 RX ADMIN — LEVOTHYROXINE SODIUM 75 MCG: 75 TABLET ORAL at 06:30

## 2020-05-22 RX ADMIN — ALUMINUM HYDROXIDE, MAGNESIUM HYDROXIDE, AND DIMETHICONE 20 ML: 400; 400; 40 SUSPENSION ORAL at 12:33

## 2020-05-22 RX ADMIN — ASPIRIN 81 MG: 81 TABLET, COATED ORAL at 08:27

## 2020-05-22 RX ADMIN — TICAGRELOR 90 MG: 90 TABLET ORAL at 08:27

## 2020-05-22 RX ADMIN — DOCUSATE SODIUM 50 MG AND SENNOSIDES 8.6 MG 2 TABLET: 8.6; 5 TABLET, FILM COATED ORAL at 08:27

## 2020-05-22 RX ADMIN — CARVEDILOL 12.5 MG: 12.5 TABLET, FILM COATED ORAL at 18:21

## 2020-05-22 RX ADMIN — FUROSEMIDE 40 MG: 40 TABLET ORAL at 06:30

## 2020-05-22 RX ADMIN — ENOXAPARIN SODIUM 40 MG: 100 INJECTION SUBCUTANEOUS at 08:27

## 2020-05-22 RX ADMIN — MIRTAZAPINE 7.5 MG: 15 TABLET, ORALLY DISINTEGRATING ORAL at 21:02

## 2020-05-22 RX ADMIN — OMEPRAZOLE 20 MG: 20 CAPSULE, DELAYED RELEASE ORAL at 08:27

## 2020-05-22 RX ADMIN — NYSTATIN: 100000 POWDER TOPICAL at 08:30

## 2020-05-22 RX ADMIN — ATORVASTATIN CALCIUM 80 MG: 40 TABLET, FILM COATED ORAL at 21:03

## 2020-05-22 RX ADMIN — GUAIFENESIN 600 MG: 600 TABLET, EXTENDED RELEASE ORAL at 08:27

## 2020-05-22 RX ADMIN — CARVEDILOL 12.5 MG: 12.5 TABLET, FILM COATED ORAL at 08:27

## 2020-05-22 RX ADMIN — DOCUSATE SODIUM 50 MG AND SENNOSIDES 8.6 MG 2 TABLET: 8.6; 5 TABLET, FILM COATED ORAL at 21:03

## 2020-05-22 RX ADMIN — AMLODIPINE BESYLATE 10 MG: 5 TABLET ORAL at 08:27

## 2020-05-22 ASSESSMENT — ACTIVITIES OF DAILY LIVING (ADL)
BED_CHAIR_WHEELCHAIR_TRANSFER_DESCRIPTION: VERBAL CUEING;SUPERVISION FOR SAFETY;SET-UP OF EQUIPMENT
TOILET_TRANSFER_DESCRIPTION: GRAB BAR;VERBAL CUEING;SUPERVISION FOR SAFETY

## 2020-05-22 ASSESSMENT — GAIT ASSESSMENTS
GAIT LEVEL OF ASSIST: SUPERVISED
ASSISTIVE DEVICE: FRONT WHEEL WALKER

## 2020-05-22 NOTE — THERAPY
Occupational Therapy  Daily Treatment     Patient Name: Sena Boyce  Age:  79 y.o., Sex:  female  Medical Record #: [id number]  Today's Date: 5/22/2020     Precautions  Precautions: (P) Fall Risk  Comments: B LE edema    Safety   ADL Safety : Requires Supervision for Safety  Bathroom Safety: Requires Supervision for Safety  Comments: see below notes for ADL performance details.    Subjective    Patient sitting in w/c in room while napping.  Agreeable to OT.     Objective       05/22/20 1301   Precautions   Precautions Fall Risk   Sitting Upper Body Exercises   Sitting Upper Body Exercises Yes   Chest Press Left;1 set of 10;Right ;Weight (See Comments for lbs)  (3 lbs L UE, no weight R UE)   Shoulder Press 3 sets of 10;Left;Weight (See Comments for lbs)  (3 lbs )   Bicep Curls 3 sets of 10;Right ;Left;Weight (See Comments for lbs)  (3 lbs on left, no weight on right)   Pronation / Supination 3 sets of 10;Right ;Left;Weight (See Comments for lbs)  (3 lbs with R and L)   Wrist Flexion / Extension 3 sets of 10;Right ;Left;Weight (See Comments for lbs)  (3 lbs with R and L)   Interdisciplinary Plan of Care Collaboration   Patient Position at End of Therapy Seated;Call Light within Reach;Tray Table within Reach;Phone within Reach   OT Total Time Spent   OT Individual Total Time Spent (Mins) 30   OT Charge Group   OT Therapeutic Exercise  2       Assessment    Right shoulder pain and impaired ROM was primary barrier to increasing R UE strength this session. Tolerated L UE exercises without complaints.    Plan    Family Training on Monday with daughter, increasing confidence with transfers, endurance    Occupational Therapy Goals     Problem: Dressing     Dates: Start: 05/13/20       Goal: STG-Within one week, patient will dress UB     Dates: Start: 05/13/20       Description: 1) Individualized Goal:  with supervision  2) Interventions:  OT Group Therapy, OT Self Care/ADL, OT Community Reintegration, OT Manual Ther  Technique, OT Neuro Re-Ed/Balance, OT Sensory Int Techniques, OT Therapeutic Activity, OT Evaluation, and OT Therapeutic Exercise      Note:     Goal Note filed on 05/21/20 1226 by Jeanette Figueroa MS,OTR/L    Pt is able to don shrit with supervision, but needs min A for open front sweater.                        Problem: OT Long Term Goals     Dates: Start: 05/13/20       Goal: LTG-By discharge, patient will complete basic self care tasks     Dates: Start: 05/13/20       Description: 1) Individualized Goal:  with Supervision  2) Interventions:  OT Group Therapy, OT Self Care/ADL, OT Community Reintegration, OT Manual Ther Technique, OT Neuro Re-Ed/Balance, OT Sensory Int Techniques, OT Therapeutic Activity, OT Evaluation, and OT Therapeutic Exercise            Goal: LTG-By discharge, patient will perform bathroom transfers     Dates: Start: 05/13/20       Description: 1) Individualized Goal:  with supervision  2) Interventions:  OT Group Therapy, OT Self Care/ADL, OT Community Reintegration, OT Manual Ther Technique, OT Neuro Re-Ed/Balance, OT Sensory Int Techniques, OT Therapeutic Activity, OT Evaluation, and OT Therapeutic Exercise

## 2020-05-22 NOTE — THERAPY
"Physical Therapy   Daily Treatment     Patient Name: Sena Boyce  Age:  79 y.o., Sex:  female  Medical Record #: [id number]  Today's Date: 5/22/2020     Precautions  Precautions: (P) Fall Risk  Comments: (P) B LE edema    Subjective    Pt seated in wc upon arrival. \" I better go to the bathroom first\"     Objective       05/22/20 0931   Precautions   Precautions Fall Risk   Comments B LE edema   Pain   Pain Scales 0 to 10 Scale    Intervention Heat Applied  (to LS during nustep-repotts decreased to 4/10)   Pain 0 - 10 Group   Location Back  (flank)   Location Orientation Right   Description Sharp  (pulsating)   Comfort Goal Comfort with Movement   Therapist Pain Assessment 9;Nurse Notified  (with ambulation )   Gait Functional Level of Assist    Gait Level Of Assist Supervised   Assistive Device Front Wheel Walker   Distance (Feet)   (75' + 50')   # of Times Distance was Traveled 2   Deviation Antalgic;Decreased Toe Off;Decreased Heel Strike;Bradykinetic  (forward flexed posture)   Wheelchair Functional Level of Assist   Wheelchair Assist Supervised   Distance Wheelchair (Feet or Distance) 80   Wheelchair Description Extra time;Verbal cueing  (B LE')   Transfer Functional Level of Assist   Toilet Transfers Supervised   Toilet Transfer Description Grab bar;Verbal cueing;Supervision for safety  (SPV/Fernanda)   Sitting Lower Body Exercises   Nustep Resistance Level 3;Time (See Comments)  (12' B LE/UE)   Bed Mobility    Sit to Stand Supervised  (SPV/Fernanda)   Interdisciplinary Plan of Care Collaboration   IDT Collaboration with  Physician;Occupational Therapist   Patient Position at End of Therapy Seated;Self Releasing Lap Belt Applied;Call Light within Reach   Collaboration Comments MD for am rounding, OT/PT inroom mobility   PT Total Time Spent   PT Individual Total Time Spent (Mins) 60   PT Charge Group   PT Gait Training 1   PT Therapeutic Exercise 1   PT Therapeutic Activities 2   Supervising Physical Therapist Kayce " Izabella       Assessment    Pt required encouragement to amb with FWW today, limited by fatigue, LBP and B knee arthritic pain. Pain was relieved with sitting as well at heat. toileting completed with distance supervision and extra time. Pt demos self limiting behaviors, requires additional time to complete activities. ambs with forward flexion bias    Plan    Assess bed mobility with leg , consider Fernanda in room at wc level    Continue functional mobility training with FWW for endurance, trial step/ curb in // bars, LE strength and endurance, balance/ standing tolerance.    Physical Therapy Problems     Problem: PT-Long Term Goals     Dates: Start: 05/13/20       Goal: LTG-By discharge, patient will ambulate     Dates: Start: 05/13/20       Description: 150 ft with LRAD, spv           Goal: LTG-By discharge, patient will transfer one surface to another     Dates: Start: 05/13/20       Description: Spv with LRAD           Goal: LTG-By discharge, patient will ambulate up/down 4-6 stairs     Dates: Start: 05/13/20       Description: 1) Individualized goal:  2 steps with single HR and CGA   2) Interventions: PT Group Therapy, PT E Stim Attended, PT Gait Training, PT Therapeutic Exercises, PT Neuro Re-Ed/Balance, PT Therapeutic Activity, and PT Manual Therapy                Goal: LTG-By discharge, patient will transfer in/out of a car     Dates: Start: 05/13/20       Description: 1) Individualized goal:  Spv with LRAD   2) Interventions: PT Group Therapy, PT E Stim Attended, PT Gait Training, PT Therapeutic Exercises, PT Neuro Re-Ed/Balance, PT Therapeutic Activity, and PT Manual Therapy

## 2020-05-22 NOTE — CARE PLAN
Problem: Communication  Goal: The ability to communicate needs accurately and effectively will improve  Description: Patient able to verbalize needs.  Will continue to monitor.   Outcome: PROGRESSING AS EXPECTED     Problem: Safety  Goal: Will remain free from injury  Description: Pt uses call light consistently and appropriately. Waits for assistance does not attempt self transfer this shift. Able to verbalize needs.   Outcome: PROGRESSING AS EXPECTED     Problem: Bowel/Gastric:  Goal: Normal bowel function is maintained or improved  Outcome: PROGRESSING AS EXPECTED     Problem: Pain Management  Goal: Pain level will decrease to patient's comfort goal  Outcome: PROGRESSING AS EXPECTED

## 2020-05-22 NOTE — THERAPY
"Physical Therapy   Daily Treatment     Patient Name: Sena Boyce  Age:  79 y.o., Sex:  female  Medical Record #: [id number]  Today's Date: 5/22/2020     Precautions  Precautions: (P) Fall Risk  Comments: (P) B LE edema    Subjective    Pt found up in w/c, agreeable to PT.  \"I have so much swelling in my legs. It just feels terrible.\"     Objective       05/22/20 1431   Precautions   Precautions Fall Risk   Comments B LE edema   Pain 0 - 10 Group   Therapist Pain Assessment During Activity  (Reports R posterior flank pain)   Standing Lower Body Exercises   Hip Extension 1 set of 10   Hip Abduction 1 set of 10   Marching 1 set of 10   Mini Squat Partial;1 set of 10   Other Exercises Performed in // bars with SPV   Bed Mobility    Sit to Stand Stand by Assist   PT Total Time Spent   PT Individual Total Time Spent (Mins) 30   PT Charge Group   PT Therapeutic Exercise 1   PT Therapeutic Activities 1     Seated ankle pumps, LAQ's performed x 10 ea, reinforced performance throughout the day while seated in w/c, to decreased swelling and /or elevating on bed to decrease dependent position.    Assessment    Pt has severe R side mm spasms.  May benefit from soft tissue mobilization to decr thor paraspinal tone.    Plan    Gait training w/ FWW, assess soft tissue R posterior flank, endurance, LE strengthening    Physical Therapy Problems     Problem: PT-Long Term Goals     Dates: Start: 05/13/20       Goal: LTG-By discharge, patient will ambulate     Dates: Start: 05/13/20       Description: 150 ft with LRAD, spv           Goal: LTG-By discharge, patient will transfer one surface to another     Dates: Start: 05/13/20       Description: Spv with LRAD           Goal: LTG-By discharge, patient will ambulate up/down 4-6 stairs     Dates: Start: 05/13/20       Description: 1) Individualized goal:  2 steps with single HR and CGA   2) Interventions: PT Group Therapy, PT E Stim Attended, PT Gait Training, PT Therapeutic Exercises, PT " Neuro Re-Ed/Balance, PT Therapeutic Activity, and PT Manual Therapy                Goal: LTG-By discharge, patient will transfer in/out of a car     Dates: Start: 05/13/20       Description: 1) Individualized goal:  Spv with LRAD   2) Interventions: PT Group Therapy, PT E Stim Attended, PT Gait Training, PT Therapeutic Exercises, PT Neuro Re-Ed/Balance, PT Therapeutic Activity, and PT Manual Therapy

## 2020-05-22 NOTE — THERAPY
"Occupational Therapy  Daily Treatment     Patient Name: Sena Boyce  Age:  79 y.o., Sex:  female  Medical Record #: [id number]  Today's Date: 5/22/2020     Precautions  Precautions: Fall Risk  Comments: B LE edema    Safety   ADL Safety : Requires Supervision for Safety  Bathroom Safety: Requires Supervision for Safety  Comments: see below notes for ADL performance details.    Subjective    \"Well, I could use this thing if I needed to for this sweatshirt. I think I can do my windbreakers that are 3 sizes too big without the dressing stick.\"    \"Yes, I definitely need a handout for each of these devices.\"     Objective       05/22/20 0831   Non Verbal Descriptors   Non Verbal Scale  Calm   Cognition    Level of Consciousness Alert   Sleep/Wake Cycle   Sleep & Rest Awake   Functional Level of Assist   Upper Body Dressing Modified Independent   Upper Body Dressing Description Dressing stick;Increased time;Verbal cueing   Lower Body Dressing Supervision   Lower Body Dressing Description Reacher;Sock aid   Bed, Chair, Wheelchair Transfer Stand by Assist   Bed Chair Wheelchair Transfer Description Verbal cueing;Supervision for safety;Set-up of equipment   Balance   Sitting Balance (Static) Fair   Sitting Balance (Dynamic) Fair   Standing Balance (Static) Fair   Bed Mobility    Supine to Sit Modified Independent   Sit to Supine Stand by Assist   Scooting Supervised   Interdisciplinary Plan of Care Collaboration   IDT Collaboration with  Physical Therapist   Patient Position at End of Therapy Seated;Self Releasing Lap Belt Applied;Call Light within Reach;Tray Table within Reach;Phone within Reach   Collaboration Comments re: potentially making pt mod I   OT Total Time Spent   OT Individual Total Time Spent (Mins) 60   OT Charge Group   OT Self Care / ADL 4       Assessment    Session focused on dressing with AE. Handouts including vendors and prices provided for sock aid, reacher, leg , and XXXL tread socks provided. " "Pt improved on bed mobility without leg , however, able to recognize that at the end of each day she would still need it.     Plan    Family Training on Monday with daughter, increasing confidence with transfers, endurance    Family Training: Review Hospital bed transfer (CM requesting semi automatic) with HOB down initially having pt get as close to HOB as possible and using Leg  to get legs. Discuss how pt does better when she is confident in the transfer and at times needs to remind herself \"I can do it!\" prior to getting legs in to bed. Pt was provided handouts for AE required, as listed above in this tx note.     Occupational Therapy Goals     Problem: Dressing     Dates: Start: 05/13/20       Goal: STG-Within one week, patient will dress UB     Dates: Start: 05/13/20       Description: 1) Individualized Goal:  with supervision  2) Interventions:  OT Group Therapy, OT Self Care/ADL, OT Community Reintegration, OT Manual Ther Technique, OT Neuro Re-Ed/Balance, OT Sensory Int Techniques, OT Therapeutic Activity, OT Evaluation, and OT Therapeutic Exercise      Note:     Goal Note filed on 05/21/20 1226 by Jeanette Figueroa MS,OTR/L    Pt is able to don shrit with supervision, but needs min A for open front sweater.                        Problem: OT Long Term Goals     Dates: Start: 05/13/20       Goal: LTG-By discharge, patient will complete basic self care tasks     Dates: Start: 05/13/20       Description: 1) Individualized Goal:  with Supervision  2) Interventions:  OT Group Therapy, OT Self Care/ADL, OT Community Reintegration, OT Manual Ther Technique, OT Neuro Re-Ed/Balance, OT Sensory Int Techniques, OT Therapeutic Activity, OT Evaluation, and OT Therapeutic Exercise            Goal: LTG-By discharge, patient will perform bathroom transfers     Dates: Start: 05/13/20       Description: 1) Individualized Goal:  with supervision  2) Interventions:  OT Group Therapy, OT Self Care/ADL, OT " Community Reintegration, OT Manual Ther Technique, OT Neuro Re-Ed/Balance, OT Sensory Int Techniques, OT Therapeutic Activity, OT Evaluation, and OT Therapeutic Exercise

## 2020-05-22 NOTE — DISCHARGE PLANNING
Richard Miller at [facility] , referral accepted.  Richard Driver at [facility], [facility] [place].  They will contact patient's daughter, Gali, to arrange delivery of hospital bed.

## 2020-05-22 NOTE — DISCHARGE PLANNING
Updated daughter Gali via phone regarding time frame for DME delivery at home address Tuesday morning. Will change DC time to afternoon.

## 2020-05-22 NOTE — DISCHARGE PLANNING
"Spoke w/ daughter Gali via phone to update IDT recommendations & on track for DC 5.26.2020. Updated Silvia Bhandari will be contacting to set up family training prior to DC Tuesday. Reviewed HH referral & [facility] for [facility]. Discussed DME recommendations: semi electric hospital bed w/ bilat side rails & FWW. Requesting manual wheelchair also for community distances. Reviewed private pay rental status for wheelchair. Requests including wheelchair in DME referral & family will private pay rental. No preference for DME vendor. States \"we need one that services [place].\" Questions answered. Emotional support provided.  "

## 2020-05-22 NOTE — PROGRESS NOTES
"Rehab Progress Note     Encounter Date: 5/22/2020    CC: decreased mobility, fatigue    Interval Events (Subjective)  Patient sitting up in room. She reports therapy is going well. She reports no change in swelling on LEs. Discussed that we have increased the dose today so will monitor over the weekend. Discussed we are still planning for Tuesday of next week. We will set everything up today as Monday is a holiday.  Discussed home health and DME have been recommended. Denies SOB.     IDT Team Meeting 5/21/2020  DC/Disposition:  5/26/20    Objective:  VITAL SIGNS: /72   Pulse 60   Temp 36.5 °C (97.7 °F) (Oral)   Resp 17   Ht 1.6 m (5' 3\")   Wt 90.7 kg (200 lb)   SpO2 94%   BMI 35.43 kg/m²   Gen: NAD  Psych: Mood and affect appropriate  CV: RRR, 2+ edema  Resp: CTAB, no upper airway sounds  Abd: NTND  Neuro: AOx4, 4/5 BLE      Recent Results (from the past 72 hour(s))   Basic Metabolic Panel    Collection Time: 05/21/20  5:32 AM   Result Value Ref Range    Sodium 137 135 - 145 mmol/L    Potassium 3.5 (L) 3.6 - 5.5 mmol/L    Chloride 104 96 - 112 mmol/L    Co2 20 20 - 33 mmol/L    Glucose 105 (H) 65 - 99 mg/dL    Bun 22 8 - 22 mg/dL    Creatinine 1.13 0.50 - 1.40 mg/dL    Calcium 9.3 8.5 - 10.5 mg/dL    Anion Gap 13.0 7.0 - 16.0   ESTIMATED GFR    Collection Time: 05/21/20  5:32 AM   Result Value Ref Range    GFR If  56 (A) >60 mL/min/1.73 m 2    GFR If Non  46 (A) >60 mL/min/1.73 m 2       Current Facility-Administered Medications   Medication Frequency   • oxymetazoline (AFRIN) 0.05 % nasal spray 2 Spray BID PRN   • furosemide (LASIX) tablet 40 mg BID DIURETIC   • potassium chloride SA (Kdur) tablet 20 mEq DAILY   • losartan (COZAAR) tablet 50 mg Q DAY   • guaiFENesin ER (MUCINEX) tablet 600 mg Q12HRS   • amLODIPine (NORVASC) tablet 10 mg DAILY   • guaiFENesin (ROBITUSSIN) 100 MG/5ML solution 200 mg Q4HRS PRN   • nystatin (MYCOSTATIN) powder BID   • Respiratory Therapy " Consult Continuous RT   • oxyCODONE immediate-release (ROXICODONE) tablet 5 mg Q3HRS PRN   • oxyCODONE immediate release (ROXICODONE) tablet 10 mg Q3HRS PRN   • tramadol (ULTRAM) 50 MG tablet 50 mg Q4HRS PRN   • hydrALAZINE (APRESOLINE) tablet 25 mg Q8HRS PRN   • acetaminophen (TYLENOL) tablet 650 mg Q4HRS PRN   • senna-docusate (PERICOLACE or SENOKOT S) 8.6-50 MG per tablet 2 Tab BID    And   • polyethylene glycol/lytes (MIRALAX) PACKET 1 Packet QDAY PRN    And   • magnesium hydroxide (MILK OF MAGNESIA) suspension 30 mL QDAY PRN    And   • bisacodyl (DULCOLAX) suppository 10 mg QDAY PRN   • artificial tears ophthalmic solution 1 Drop PRN   • benzocaine-menthol (CEPACOL) lozenge 1 Lozenge Q2HRS PRN   • mag hydrox-al hydrox-simeth (MAALOX PLUS ES or MYLANTA DS) suspension 20 mL Q2HRS PRN   • ondansetron (ZOFRAN ODT) dispertab 4 mg 4X/DAY PRN    Or   • ondansetron (ZOFRAN) syringe/vial injection 4 mg 4X/DAY PRN   • traZODone (DESYREL) tablet 50 mg QHS PRN   • sodium chloride (OCEAN) 0.65 % nasal spray 2 Spray PRN   • enoxaparin (LOVENOX) inj 40 mg DAILY   • ticagrelor (BRILINTA) tablet 90 mg BID   • aspirin EC (ECOTRIN) tablet 81 mg DAILY   • atorvastatin (LIPITOR) tablet 80 mg Nightly   • carvedilol (COREG) tablet 12.5 mg BID WITH MEALS   • levothyroxine (SYNTHROID) tablet 75 mcg AM ES   • mirtazapine (REMERON) orally disintegrating tab 7.5 mg QHS   • omeprazole (PRILOSEC) capsule 20 mg DAILY       Orders Placed This Encounter   Procedures   • Diet Order Cardiac     Standing Status:   Standing     Number of Occurrences:   1     Order Specific Question:   Diet:     Answer:   Cardiac [6]       Assessment:  Active Hospital Problems    Diagnosis   • *ACS (acute coronary syndrome) (HCC)   • Hypercholesteremia   • History of coronary angioplasty with insertion of stent   • Acquired hypothyroidism   • Essential hypertension       Medical Decision Making and Plan:  ACS s/p FELIPE x3 - presented from OSH with chest pain, s/p  stenting on 5/8/20 with Dr. Olivas. Continue ASA, Brilinta.  -PT and OT for mobility and ADLs  -Follow-up cardiology, PCP     HTN - Patient on Coreg 12.5 mg BID, Lisinopril 15 mg daily. Previously on Amlodipine and Valsartan. TTE with EF of 65 and grade 2 diastolic dysfunction  -Elevated on Admission, restart Amlodipine 5 mg, into 140s and 130s, will increase Lisinopril to 20 mg. Increase Amlodipine to 10 mg and monitor over weekend. Switch from ACEi to ARB  -Start Lasix 40 mg in AM. Check AM Labs - K 3.5. Increase to BID  -Patient unable to lie flat without desaturating due to diastolic dysfunction, needs hospital bed for increased HOB.     HLD - Patient on Atorvastatin 80 mg daily     LE edema - Continue to monitor  -Started on Lasix increase to twice a day, monitor over weekend      Cough - New cough over weekend, check CXR 5/18/20 and start on Mucinex   -CXR - wnl. Cough improved on Mucinex, will switch ACEi to ARB. Improved discontinue Mucinex    Anemia - mild on admission labs at 11.6, continue to monitor    Hypokalemia - 3.1 on admission. Start K supplement, recheck on 5/18/20 - 3.5   -Starting on Lasix, restart K supplement. Check BMP - 3.5, stable    Mood - Patient on Remeron 7.5 mg qHS at home.     Vitamin D deficiency - 22 on admission start 1000 U    Obesity, class II - BMI of 35.43 on admission. Dietitian to consult    GI - Start on Omeprazole.      DVT Ppx - Patient on Lovenox on transfer. Ambulating > 125 feet, discontinue Lovenox    Total time:  27 minutes.  I spent greater than 50% of the time for patient care, counseling, and coordination on this date, including unit/floor time, and face-to-face time with the patient as per interval events and assessment and plan above. Topics discussed included discharge planning for Tuesday, DME including railing, HH, no change in swelling, and discontinue Lovenox as ambulating.     Ever Longo M.D.

## 2020-05-23 PROCEDURE — A9270 NON-COVERED ITEM OR SERVICE: HCPCS | Performed by: PHYSICAL MEDICINE & REHABILITATION

## 2020-05-23 PROCEDURE — 770010 HCHG ROOM/CARE - REHAB SEMI PRIVAT*

## 2020-05-23 PROCEDURE — 700102 HCHG RX REV CODE 250 W/ 637 OVERRIDE(OP): Performed by: PHYSICAL MEDICINE & REHABILITATION

## 2020-05-23 RX ADMIN — OMEPRAZOLE 20 MG: 20 CAPSULE, DELAYED RELEASE ORAL at 08:40

## 2020-05-23 RX ADMIN — ASPIRIN 81 MG: 81 TABLET, COATED ORAL at 08:39

## 2020-05-23 RX ADMIN — CARVEDILOL 12.5 MG: 12.5 TABLET, FILM COATED ORAL at 17:52

## 2020-05-23 RX ADMIN — GUAIFENESIN 600 MG: 600 TABLET, EXTENDED RELEASE ORAL at 20:19

## 2020-05-23 RX ADMIN — POTASSIUM CHLORIDE 20 MEQ: 1500 TABLET, EXTENDED RELEASE ORAL at 08:39

## 2020-05-23 RX ADMIN — AMLODIPINE BESYLATE 10 MG: 5 TABLET ORAL at 08:39

## 2020-05-23 RX ADMIN — ATORVASTATIN CALCIUM 80 MG: 40 TABLET, FILM COATED ORAL at 20:14

## 2020-05-23 RX ADMIN — TICAGRELOR 90 MG: 90 TABLET ORAL at 08:40

## 2020-05-23 RX ADMIN — FUROSEMIDE 40 MG: 40 TABLET ORAL at 14:03

## 2020-05-23 RX ADMIN — LOSARTAN POTASSIUM 50 MG: 25 TABLET, FILM COATED ORAL at 05:49

## 2020-05-23 RX ADMIN — DOCUSATE SODIUM 50 MG AND SENNOSIDES 8.6 MG 2 TABLET: 8.6; 5 TABLET, FILM COATED ORAL at 08:40

## 2020-05-23 RX ADMIN — LEVOTHYROXINE SODIUM 75 MCG: 75 TABLET ORAL at 05:48

## 2020-05-23 RX ADMIN — TICAGRELOR 90 MG: 90 TABLET ORAL at 20:14

## 2020-05-23 RX ADMIN — FUROSEMIDE 40 MG: 40 TABLET ORAL at 05:48

## 2020-05-23 RX ADMIN — NYSTATIN: 100000 POWDER TOPICAL at 08:42

## 2020-05-23 RX ADMIN — BENZOCAINE AND MENTHOL 1 LOZENGE: 15; 3.6 LOZENGE ORAL at 20:19

## 2020-05-23 RX ADMIN — MIRTAZAPINE 7.5 MG: 15 TABLET, ORALLY DISINTEGRATING ORAL at 20:15

## 2020-05-23 RX ADMIN — CARVEDILOL 12.5 MG: 12.5 TABLET, FILM COATED ORAL at 08:40

## 2020-05-23 NOTE — CARE PLAN
Problem: Safety  Goal: Will remain free from injury  Description: Pt uses call light consistently and appropriately. Waits for assistance does not attempt self transfer this shift. Able to verbalize needs.   Outcome: PROGRESSING AS EXPECTED  Note: Reviewed fall and safety precautions with patient and reminded patient to call for assistance before getting out of bed. Patient verbalized understanding and has not attempted self transfer this shift.      Problem: Bowel/Gastric:  Goal: Normal bowel function is maintained or improved  Outcome: PROGRESSING SLOWER THAN EXPECTED  Note: Patient c/o feeling nausea and ingestion. Medicated her with Maalox for indigestion. Recheck patient and she said she is feeling better now. Had loose BM in the afternoon. Will continue to monitor.

## 2020-05-23 NOTE — CARE PLAN
Pt denies any pain. Did have some stomach upset and had two bowel movements this AM on the looser side. Had a poor appetite at lunch. Has no therapy today, spending day so far having visitors through window/telephone.     Problem: Bowel/Gastric:  Goal: Normal bowel function is maintained or improved  Outcome: PROGRESSING AS EXPECTED  Goal: Will not experience complications related to bowel motility  Outcome: PROGRESSING AS EXPECTED     Problem: Pain Management  Goal: Pain level will decrease to patient's comfort goal  Outcome: PROGRESSING AS EXPECTED

## 2020-05-24 PROCEDURE — 97530 THERAPEUTIC ACTIVITIES: CPT

## 2020-05-24 PROCEDURE — 97110 THERAPEUTIC EXERCISES: CPT

## 2020-05-24 PROCEDURE — A9270 NON-COVERED ITEM OR SERVICE: HCPCS | Performed by: PHYSICAL MEDICINE & REHABILITATION

## 2020-05-24 PROCEDURE — 97535 SELF CARE MNGMENT TRAINING: CPT

## 2020-05-24 PROCEDURE — 770010 HCHG ROOM/CARE - REHAB SEMI PRIVAT*

## 2020-05-24 PROCEDURE — 99232 SBSQ HOSP IP/OBS MODERATE 35: CPT | Performed by: PHYSICAL MEDICINE & REHABILITATION

## 2020-05-24 PROCEDURE — 97140 MANUAL THERAPY 1/> REGIONS: CPT

## 2020-05-24 PROCEDURE — 700102 HCHG RX REV CODE 250 W/ 637 OVERRIDE(OP): Performed by: PHYSICAL MEDICINE & REHABILITATION

## 2020-05-24 PROCEDURE — 97116 GAIT TRAINING THERAPY: CPT

## 2020-05-24 PROCEDURE — 700111 HCHG RX REV CODE 636 W/ 250 OVERRIDE (IP): Performed by: PHYSICAL MEDICINE & REHABILITATION

## 2020-05-24 RX ORDER — AMOXICILLIN 250 MG
2 CAPSULE ORAL 2 TIMES DAILY PRN
Status: DISCONTINUED | OUTPATIENT
Start: 2020-05-24 | End: 2020-05-26 | Stop reason: HOSPADM

## 2020-05-24 RX ORDER — POLYETHYLENE GLYCOL 3350 17 G/17G
1 POWDER, FOR SOLUTION ORAL
Status: DISCONTINUED | OUTPATIENT
Start: 2020-05-24 | End: 2020-05-26 | Stop reason: HOSPADM

## 2020-05-24 RX ORDER — BISACODYL 10 MG
10 SUPPOSITORY, RECTAL RECTAL
Status: DISCONTINUED | OUTPATIENT
Start: 2020-05-24 | End: 2020-05-26 | Stop reason: HOSPADM

## 2020-05-24 RX ORDER — LOSARTAN POTASSIUM 25 MG/1
75 TABLET ORAL
Status: DISCONTINUED | OUTPATIENT
Start: 2020-05-25 | End: 2020-05-26 | Stop reason: HOSPADM

## 2020-05-24 RX ADMIN — ONDANSETRON 4 MG: 4 TABLET, ORALLY DISINTEGRATING ORAL at 05:41

## 2020-05-24 RX ADMIN — TICAGRELOR 90 MG: 90 TABLET ORAL at 22:52

## 2020-05-24 RX ADMIN — POTASSIUM CHLORIDE 20 MEQ: 1500 TABLET, EXTENDED RELEASE ORAL at 08:33

## 2020-05-24 RX ADMIN — TICAGRELOR 90 MG: 90 TABLET ORAL at 08:33

## 2020-05-24 RX ADMIN — LEVOTHYROXINE SODIUM 75 MCG: 75 TABLET ORAL at 05:25

## 2020-05-24 RX ADMIN — MIRTAZAPINE 7.5 MG: 15 TABLET, ORALLY DISINTEGRATING ORAL at 22:52

## 2020-05-24 RX ADMIN — OMEPRAZOLE 20 MG: 20 CAPSULE, DELAYED RELEASE ORAL at 08:33

## 2020-05-24 RX ADMIN — ASPIRIN 81 MG: 81 TABLET, COATED ORAL at 08:33

## 2020-05-24 RX ADMIN — CARVEDILOL 12.5 MG: 12.5 TABLET, FILM COATED ORAL at 08:33

## 2020-05-24 RX ADMIN — FUROSEMIDE 40 MG: 40 TABLET ORAL at 14:29

## 2020-05-24 RX ADMIN — TRAZODONE HYDROCHLORIDE 50 MG: 50 TABLET ORAL at 22:52

## 2020-05-24 RX ADMIN — NYSTATIN: 100000 POWDER TOPICAL at 09:00

## 2020-05-24 RX ADMIN — LOSARTAN POTASSIUM 50 MG: 25 TABLET, FILM COATED ORAL at 05:25

## 2020-05-24 RX ADMIN — CARVEDILOL 12.5 MG: 12.5 TABLET, FILM COATED ORAL at 17:46

## 2020-05-24 RX ADMIN — FUROSEMIDE 40 MG: 40 TABLET ORAL at 05:25

## 2020-05-24 RX ADMIN — OXYMETAZOLINE HYDROCHLORIDE 2 SPRAY: 5 SPRAY NASAL at 08:00

## 2020-05-24 RX ADMIN — NYSTATIN: 100000 POWDER TOPICAL at 22:52

## 2020-05-24 RX ADMIN — ATORVASTATIN CALCIUM 80 MG: 40 TABLET, FILM COATED ORAL at 22:52

## 2020-05-24 RX ADMIN — AMLODIPINE BESYLATE 10 MG: 5 TABLET ORAL at 08:33

## 2020-05-24 ASSESSMENT — GAIT ASSESSMENTS
GAIT LEVEL OF ASSIST: CONTACT GUARD ASSIST
DEVIATION: BRADYKINETIC
ASSISTIVE DEVICE: FRONT WHEEL WALKER

## 2020-05-24 NOTE — THERAPY
Physical Therapy   Daily Treatment     Patient Name: Sena Boyce  Age:  79 y.o., Sex:  female  Medical Record #: [id number]  Today's Date: 5/24/2020     Precautions  Precautions: (P) Fall Risk, Chest Tube, Other (See Comments)  Comments: (P) B LE edema, L LE weakness    Subjective    Pt received in wc in room, agreeable to PT session. Notes she doesn't think she'll be able to live alone moving forward. She has a small threshold to enter home that is going to be ramped. Her daughter and JENARO are currently staying with her.      Objective       05/24/20 0931   Precautions   Precautions Fall Risk;Chest Tube;Other (See Comments)   Comments B LE edema, L LE weakness   Vitals   Pulse 69   Room Air Oximetry 95   Cognition    Level of Consciousness Alert   Gait Functional Level of Assist    Gait Level Of Assist Contact Guard Assist   Assistive Device Front Wheel Walker   Distance (Feet)   (4x35' and 1x70')   Deviation Bradykinetic  (heavy reliance on UEs through FWW, mild fwd trunk flex)   Wheelchair Functional Level of Assist   Wheelchair Assist Minimal Assist   Distance Wheelchair (Feet or Distance) 160   Wheelchair Description Extra time;Requires incidental assist;Verbal cueing   Transfer Functional Level of Assist   Bed, Chair, Wheelchair Transfer   (STS CGA wc<>FWW)   Standing Lower Body Exercises   Marching 2 sets of 10   Mini Squat Partial;1 set of 10  (manual cues for technique)   Other Exercises in // bars   Bed Mobility    Sit to Stand Contact Guard Assist   Interdisciplinary Plan of Care Collaboration   IDT Collaboration with  Nursing   Patient Position at End of Therapy Seated;Self Releasing Lap Belt Applied;Call Light within Reach;Tray Table within Reach;Phone within Reach;Other (Comments)   Collaboration Comments pt with multiple episodes of epistaxis during session-RN aware   PT Total Time Spent   PT Individual Total Time Spent (Mins) 60   PT Charge Group   PT Gait Training 1   PT Therapeutic Exercise 1   PT  Therapeutic Activities 2     Pt amb with pediatric FWW which fit her appropriately.     Discussion of POC, PT goals, pt progress towards goals.    Assessment    Pt's ambulation distance limited by decreased endurance and fatigue. Pt notes a fear of falling- PT provided education on safety with mobility, use of FWW, use of gait belt, and plan for HH PT upon discharge.    Plan    Prepare for DC to home on Tuesday May 26. Gait training with FWW, endurance, LE strengthening    Physical Therapy Problems     Problem: PT-Long Term Goals     Dates: Start: 05/13/20       Goal: LTG-By discharge, patient will ambulate     Dates: Start: 05/13/20       Description: 150 ft with LRAD, spv           Goal: LTG-By discharge, patient will transfer one surface to another     Dates: Start: 05/13/20       Description: Spv with LRAD           Goal: LTG-By discharge, patient will ambulate up/down 4-6 stairs     Dates: Start: 05/13/20       Description: 1) Individualized goal:  2 steps with single HR and CGA   2) Interventions: PT Group Therapy, PT E Stim Attended, PT Gait Training, PT Therapeutic Exercises, PT Neuro Re-Ed/Balance, PT Therapeutic Activity, and PT Manual Therapy                Goal: LTG-By discharge, patient will transfer in/out of a car     Dates: Start: 05/13/20       Description: 1) Individualized goal:  Spv with LRAD   2) Interventions: PT Group Therapy, PT E Stim Attended, PT Gait Training, PT Therapeutic Exercises, PT Neuro Re-Ed/Balance, PT Therapeutic Activity, and PT Manual Therapy

## 2020-05-24 NOTE — CARE PLAN
Pt TEDs were uncomfortable, therapy now working with patient to apply tape as an alternative. Continues on low dose aspirin. Small area of excoriation observed on left inner buttock and blanchable redness on b/l buttocks. Barrier cream applied and foam dressing applied to sacral area for comfort/protection.     Problem: Venous Thromboembolism (VTW)/Deep Vein Thrombosis (DVT) Prevention:  Goal: Patient will participate in Venous Thrombosis (VTE)/Deep Vein Thrombosis (DVT)Prevention Measures  Outcome: PROGRESSING AS EXPECTED     Problem: Skin Integrity  Goal: Risk for impaired skin integrity will decrease  Outcome: PROGRESSING AS EXPECTED

## 2020-05-24 NOTE — THERAPY
"Occupational Therapy  Daily Treatment     Patient Name: Sena Boyce  Age:  79 y.o., Sex:  female  Medical Record #: [id number]  Today's Date: 5/24/2020     Precautions  Precautions: (P) Fall Risk, Chest Tube, Other (See Comments)  Comments: (P) B LE edema, L LE weakness    Safety   ADL Safety : Requires Supervision for Safety  Bathroom Safety: Requires Supervision for Safety  Comments: see below notes for ADL performance details.    Subjective    \"Everyone wants me to walk, but my legs feel like they weigh 100 lbs.\"  Re: BLE edema     Objective       05/24/20 1500   Bed Mobility    Sit to Supine Stand by Assist   Interdisciplinary Plan of Care Collaboration   IDT Collaboration with  Nursing   Patient Position at End of Therapy In Bed;Phone within Reach;Tray Table within Reach;Call Light within Reach   OT Total Time Spent   OT Individual Total Time Spent (Mins) 75   OT Charge Group   Charges Yes   OT Self Care / ADL 3   OT Manual Therapy Technique 2         Assessment    Extensive education provided regarding the relationship between the cardiovascular system and the lymphatic system, edema vs. Lymphedema, and light manual techniques applied to popliteal/inguinal collectors to increase lymphatic flow.   Patient directed to discuss ongoing compression/complete decongestive therapy options with PCP/cardiologist if appropriate.  KT edema pattern applied to bilateral distal extremities with supine ankle pump, toe curls, modified LAQs.     Plan    Family training tomorrow with daughter and son-in-law    Occupational Therapy Goals     Problem: Dressing     Dates: Start: 05/13/20       Goal: STG-Within one week, patient will dress UB     Dates: Start: 05/13/20       Description: 1) Individualized Goal:  with supervision  2) Interventions:  OT Group Therapy, OT Self Care/ADL, OT Community Reintegration, OT Manual Ther Technique, OT Neuro Re-Ed/Balance, OT Sensory Int Techniques, OT Therapeutic Activity, OT Evaluation, and " OT Therapeutic Exercise      Note:     Goal Note filed on 05/21/20 1226 by Jeanette Figueroa, MS,OTR/L    Pt is able to don shrit with supervision, but needs min A for open front sweater.                        Problem: OT Long Term Goals     Dates: Start: 05/13/20       Goal: LTG-By discharge, patient will complete basic self care tasks     Dates: Start: 05/13/20       Description: 1) Individualized Goal:  with Supervision  2) Interventions:  OT Group Therapy, OT Self Care/ADL, OT Community Reintegration, OT Manual Ther Technique, OT Neuro Re-Ed/Balance, OT Sensory Int Techniques, OT Therapeutic Activity, OT Evaluation, and OT Therapeutic Exercise            Goal: LTG-By discharge, patient will perform bathroom transfers     Dates: Start: 05/13/20       Description: 1) Individualized Goal:  with supervision  2) Interventions:  OT Group Therapy, OT Self Care/ADL, OT Community Reintegration, OT Manual Ther Technique, OT Neuro Re-Ed/Balance, OT Sensory Int Techniques, OT Therapeutic Activity, OT Evaluation, and OT Therapeutic Exercise

## 2020-05-24 NOTE — CARE PLAN
Problem: Communication  Goal: The ability to communicate needs accurately and effectively will improve  Description: Patient able to verbalize needs.  Will continue to monitor.   Outcome: PROGRESSING AS EXPECTED  Note: Pt is able to communicate her needs effectively to staff.      Problem: Safety  Goal: Will remain free from injury  Description: Pt uses call light consistently and appropriately. Waits for assistance does not attempt self transfer this shift. Able to verbalize needs.   Outcome: PROGRESSING AS EXPECTED  Note: Pt uses call light consistently for staff assistance. Pt has good safety awareness, no impulsivity observed.      Problem: Infection  Goal: Will remain free from infection  Outcome: PROGRESSING AS EXPECTED  Note: No s/s of infection present

## 2020-05-24 NOTE — PROGRESS NOTES
Had small nose bleed before participating in therapy today. Nasal spray utilized, had some more small intermittent bleeding but appears to have stopped, will continue to monitor.

## 2020-05-24 NOTE — PROGRESS NOTES
"Rehab Progress Note     Encounter Date: 5/24/2020    CC: decreased mobility, fatigue    Interval Events (Subjective)  Patient sitting up in room. She has increased ROM of her right and left ankle, right more than left. She reports she thinks she has lost some of the edema but has a ways to go. Denies NVD, some loose BMs. Would like bowel medications held. Denies SOB.      IDT Team Meeting 5/21/2020  DC/Disposition:  5/26/20    Objective:  VITAL SIGNS: /61   Pulse 68   Temp 36.6 °C (97.9 °F) (Oral)   Resp 17   Ht 1.6 m (5' 3\")   Wt 90.7 kg (200 lb)   SpO2 95%   BMI 35.43 kg/m²   Gen: NAD  Psych: Mood and affect appropriate  CV: RRR, 2+ LLE, 1+ RLE  Resp: CTAB, no upper airway sounds  Abd: NTND  Neuro: AOx4, pushing wheelchair BUE      No results found for this or any previous visit (from the past 72 hour(s)).    Current Facility-Administered Medications   Medication Frequency   • guaiFENesin ER (MUCINEX) tablet 600 mg Q12HRS PRN   • furosemide (LASIX) tablet 40 mg BID DIURETIC   • potassium chloride SA (Kdur) tablet 20 mEq DAILY   • losartan (COZAAR) tablet 50 mg Q DAY   • amLODIPine (NORVASC) tablet 10 mg DAILY   • guaiFENesin (ROBITUSSIN) 100 MG/5ML solution 200 mg Q4HRS PRN   • nystatin (MYCOSTATIN) powder BID   • Respiratory Therapy Consult Continuous RT   • oxyCODONE immediate-release (ROXICODONE) tablet 5 mg Q3HRS PRN   • oxyCODONE immediate release (ROXICODONE) tablet 10 mg Q3HRS PRN   • tramadol (ULTRAM) 50 MG tablet 50 mg Q4HRS PRN   • hydrALAZINE (APRESOLINE) tablet 25 mg Q8HRS PRN   • acetaminophen (TYLENOL) tablet 650 mg Q4HRS PRN   • senna-docusate (PERICOLACE or SENOKOT S) 8.6-50 MG per tablet 2 Tab BID    And   • polyethylene glycol/lytes (MIRALAX) PACKET 1 Packet QDAY PRN    And   • magnesium hydroxide (MILK OF MAGNESIA) suspension 30 mL QDAY PRN    And   • bisacodyl (DULCOLAX) suppository 10 mg QDAY PRN   • artificial tears ophthalmic solution 1 Drop PRN   • benzocaine-menthol (CEPACOL) " lozenge 1 Lozenge Q2HRS PRN   • mag hydrox-al hydrox-simeth (MAALOX PLUS ES or MYLANTA DS) suspension 20 mL Q2HRS PRN   • ondansetron (ZOFRAN ODT) dispertab 4 mg 4X/DAY PRN    Or   • ondansetron (ZOFRAN) syringe/vial injection 4 mg 4X/DAY PRN   • traZODone (DESYREL) tablet 50 mg QHS PRN   • sodium chloride (OCEAN) 0.65 % nasal spray 2 Spray PRN   • ticagrelor (BRILINTA) tablet 90 mg BID   • aspirin EC (ECOTRIN) tablet 81 mg DAILY   • atorvastatin (LIPITOR) tablet 80 mg Nightly   • carvedilol (COREG) tablet 12.5 mg BID WITH MEALS   • levothyroxine (SYNTHROID) tablet 75 mcg AM ES   • mirtazapine (REMERON) orally disintegrating tab 7.5 mg QHS   • omeprazole (PRILOSEC) capsule 20 mg DAILY       Orders Placed This Encounter   Procedures   • Diet Order Cardiac     Standing Status:   Standing     Number of Occurrences:   1     Order Specific Question:   Diet:     Answer:   Cardiac [6]       Assessment:  Active Hospital Problems    Diagnosis   • *ACS (acute coronary syndrome) (HCC)   • Hypercholesteremia   • History of coronary angioplasty with insertion of stent   • Acquired hypothyroidism   • Essential hypertension       Medical Decision Making and Plan:  ACS s/p FELIPE x3 - presented from OSH with chest pain, s/p stenting on 5/8/20 with Dr. Olivas. Continue ASA, Brilinta.  -PT and OT for mobility and ADLs  -Follow-up cardiology, PCP     HTN - Patient on Coreg 12.5 mg BID, Lisinopril 15 mg daily. Previously on Amlodipine and Valsartan. TTE with EF of 65 and grade 2 diastolic dysfunction  -Elevated on Admission, restart Amlodipine 5 mg, into 140s and 130s, will increase Lisinopril to 20 mg. Increase Amlodipine to 10 mg and monitor over weekend. Switch from ACEi to ARB. Increase Losartan to 75 mg, ongoing SBP into 150s  -Start Lasix 40 mg in AM. Check AM Labs - K 3.5. Increase to BID  -Patient unable to lie flat without desaturating due to diastolic dysfunction, needs hospital bed for increased HOB.     HLD - Patient on  Atorvastatin 80 mg daily     LE edema - Continue to monitor  -Started on Lasix increase to twice a day, monitor over weekend      Cough - New cough over weekend, check CXR 5/18/20 and start on Mucinex   -CXR - wnl. Cough improved on Mucinex, will switch ACEi to ARB. Improved discontinue Mucinex    Anemia - mild on admission labs at 11.6, continue to monitor    Hypokalemia - 3.1 on admission. Start K supplement, recheck on 5/18/20 - 3.5   -Starting on Lasix, restart K supplement. Check BMP - 3.5, stable    Mood - Patient on Remeron 7.5 mg qHS at home.     Vitamin D deficiency - 22 on admission start 1000 U    Obesity, class II - BMI of 35.43 on admission. Dietitian to consult    GI - Start on Omeprazole. Hold bowel medications     DVT Ppx - Patient on Lovenox on transfer. Ambulating > 125 feet, discontinue Lovenox    Total time:  26 minutes.  I spent greater than 50% of the time for patient care, counseling, and coordination on this date, including unit/floor time, and face-to-face time with the patient as per interval events and assessment and plan above. Topics discussed included discharge planning, bowel medications, and elevated SBP. Increase Losartan.     Ever Longo M.D.

## 2020-05-25 PROCEDURE — 97116 GAIT TRAINING THERAPY: CPT

## 2020-05-25 PROCEDURE — 97530 THERAPEUTIC ACTIVITIES: CPT

## 2020-05-25 PROCEDURE — 700102 HCHG RX REV CODE 250 W/ 637 OVERRIDE(OP): Performed by: PHYSICAL MEDICINE & REHABILITATION

## 2020-05-25 PROCEDURE — 97535 SELF CARE MNGMENT TRAINING: CPT

## 2020-05-25 PROCEDURE — A9270 NON-COVERED ITEM OR SERVICE: HCPCS | Performed by: PHYSICAL MEDICINE & REHABILITATION

## 2020-05-25 PROCEDURE — 770010 HCHG ROOM/CARE - REHAB SEMI PRIVAT*

## 2020-05-25 RX ADMIN — NYSTATIN: 100000 POWDER TOPICAL at 08:20

## 2020-05-25 RX ADMIN — NYSTATIN: 100000 POWDER TOPICAL at 20:56

## 2020-05-25 RX ADMIN — MIRTAZAPINE 7.5 MG: 15 TABLET, ORALLY DISINTEGRATING ORAL at 20:54

## 2020-05-25 RX ADMIN — ASPIRIN 81 MG: 81 TABLET, COATED ORAL at 08:18

## 2020-05-25 RX ADMIN — FUROSEMIDE 40 MG: 40 TABLET ORAL at 05:43

## 2020-05-25 RX ADMIN — ATORVASTATIN CALCIUM 80 MG: 40 TABLET, FILM COATED ORAL at 20:54

## 2020-05-25 RX ADMIN — LEVOTHYROXINE SODIUM 75 MCG: 75 TABLET ORAL at 05:43

## 2020-05-25 RX ADMIN — AMLODIPINE BESYLATE 10 MG: 5 TABLET ORAL at 08:18

## 2020-05-25 RX ADMIN — POTASSIUM CHLORIDE 20 MEQ: 1500 TABLET, EXTENDED RELEASE ORAL at 08:18

## 2020-05-25 RX ADMIN — TRAZODONE HYDROCHLORIDE 50 MG: 50 TABLET ORAL at 20:54

## 2020-05-25 RX ADMIN — LOSARTAN POTASSIUM 75 MG: 25 TABLET, FILM COATED ORAL at 05:43

## 2020-05-25 RX ADMIN — FUROSEMIDE 40 MG: 40 TABLET ORAL at 13:38

## 2020-05-25 RX ADMIN — TICAGRELOR 90 MG: 90 TABLET ORAL at 08:18

## 2020-05-25 RX ADMIN — OMEPRAZOLE 20 MG: 20 CAPSULE, DELAYED RELEASE ORAL at 08:18

## 2020-05-25 RX ADMIN — TICAGRELOR 90 MG: 90 TABLET ORAL at 20:54

## 2020-05-25 RX ADMIN — CARVEDILOL 12.5 MG: 12.5 TABLET, FILM COATED ORAL at 08:18

## 2020-05-25 ASSESSMENT — ACTIVITIES OF DAILY LIVING (ADL)
BED_CHAIR_WHEELCHAIR_TRANSFER_DESCRIPTION: INCREASED TIME;SUPERVISION FOR SAFETY;VERBAL CUEING
TOILET_TRANSFER_LEVEL_OF_ASSIST: ABLE TO COMPLETE TOILET TRANSFER WITHOUT ASSIST
TOILET_TRANSFER_DESCRIPTION: GRAB BAR
SHOWER_TRANSFER_LEVEL_OF_ASSIST: REQUIRES SUPERVISION WITH SHOWER TRANSFER
TOILETING_LEVEL_OF_ASSIST_DESCRIPTION: GRAB BAR;INCREASED TIME
TUB_SHOWER_TRANSFER_DESCRIPTION: GRAB BAR;INCREASED TIME;SHOWER BENCH
TOILETING_LEVEL_OF_ASSIST: ABLE TO COMPLETE TOILETING WITHOUT ASSIST

## 2020-05-25 ASSESSMENT — GAIT ASSESSMENTS
DISTANCE (FEET): 30
GAIT LEVEL OF ASSIST: CONTACT GUARD ASSIST
ASSISTIVE DEVICE: FRONT WHEEL WALKER
ASSISTIVE DEVICE: FRONT WHEEL WALKER
DISTANCE (FEET): 70
GAIT LEVEL OF ASSIST: STAND BY ASSIST

## 2020-05-25 NOTE — THERAPY
Occupational Therapy  Daily Treatment     Patient Name: Sena Boyce  Age:  79 y.o., Sex:  female  Medical Record #: [id number]  Today's Date: 5/25/2020     Precautions  Precautions: (P) Fall Risk, Other (See Comments)  Comments: (P) BLE Edema    Safety   ADL Safety : Requires Supervision for Safety  Bathroom Safety: Requires Supervision for Safety  Comments: see below notes for ADL performance details.    Subjective    Patient and daughter met in [place] for family training.  Patient feels that she is at her baseline for ADL pursuits but will need some assistance with community mobility and IADL tasks.     Objective       05/25/20 1101   Precautions   Precautions Fall Risk;Other (See Comments)   Comments BLE Edema   Interdisciplinary Plan of Care Collaboration   IDT Collaboration with  Family / Caregiver   Patient Position at End of Therapy Seated;Tray Table within Reach;Call Light within Reach;Phone within Reach   Collaboration Comments family training with daughter    OT Total Time Spent   OT Individual Total Time Spent (Mins) 30   OT Charge Group   Charges Yes   OT Therapy Activity 2     Assessment    Family training completed with patient's daughter Gali to review transfer strategies, ongoing edema intervention, and home safety. Patient and Gali express that they belief the patient is at or near her baseline regarding self-care pursuits and will be providing ongoing supervision upon discharge.  Home DME has been purchased and grab bars installed.       Plan    D/C tomorrow afternoon     Occupational Therapy Goals (Active)      There are no active problems.

## 2020-05-25 NOTE — CARE PLAN
Problem: Communication  Goal: The ability to communicate needs accurately and effectively will improve  Description: Patient able to verbalize needs.  Will continue to monitor.   Note: Encouraged to make needs known to staff and to use call light for staff assist.      Problem: Safety  Goal: Will remain free from falls  Note: Call light kept within reach and encouraged to use for assistance and with toileting needs. Encouraged to call staff and to wait for staff before transfers.

## 2020-05-25 NOTE — THERAPY
Physical Therapy   Daily Treatment     Patient Name: Sena Boyce  Age:  79 y.o., Sex:  female  Medical Record #: [id number]  Today's Date: 5/25/2020     Precautions  Precautions: (P) Fall Risk, Other (See Comments)  Comments: (P) B LE edema    Subjective    Pt received in room, agreeable to PT session. Lengthy discussion with PT regarding her discharge plan, reasonable long term goals, safety with mobility.     Objective       05/25/20 1501   Precautions   Precautions Fall Risk;Other (See Comments)   Comments B LE edema   Cognition    Level of Consciousness Alert   Gait Functional Level of Assist    Gait Level Of Assist Stand by Assist   Assistive Device Front Wheel Walker   Distance (Feet) 70   # of Times Distance was Traveled 1   Deviation   (decreased tigist)   Stairs Functional Level of Assist   Level of Assist with Stairs Refused   Transfer Functional Level of Assist   Bed, Chair, Wheelchair Transfer Stand by Assist   Bed Chair Wheelchair Transfer Description Increased time;Supervision for safety;Verbal cueing   Bed Mobility    Sit to Stand Stand by Assist   Interdisciplinary Plan of Care Collaboration   Patient Position at End of Therapy Seated;Self Releasing Lap Belt Applied;Call Light within Reach;Tray Table within Reach;Phone within Reach   PT Total Time Spent   PT Group Total Time Spent (Mins) 60   PT Charge Group   PT Gait Training 1   PT Therapeutic Activities 3     Pt attempted car tx however due to depth of seat and head clearance she discontinued transfer after sitting at edge of passenger seat.  Gait uneven surfaces with CGA with FWW.  Pt unable to  object from floor safely.  Pt declined participation in stairs.   Assessment    Pt verbalizes readiness for DC to home tomorrow with FWW, hospital bed, manual wc, and home health RN and therapies. Her daughter was educated in providing CGA during transfers and gait with FWW during am session.    Plan    DC to home tomorrow.    Physical Therapy  Problems     Problem: PT-Long Term Goals     Dates: Start: 05/13/20       Goal: LTG-By discharge, patient will ambulate     Dates: Start: 05/13/20       Description: 150 ft with LRAD, spv           Goal: LTG-By discharge, patient will transfer one surface to another     Dates: Start: 05/13/20       Description: Spv with LRAD           Goal: LTG-By discharge, patient will ambulate up/down 4-6 stairs     Dates: Start: 05/13/20       Description: 1) Individualized goal:  2 steps with single HR and CGA   2) Interventions: PT Group Therapy, PT E Stim Attended, PT Gait Training, PT Therapeutic Exercises, PT Neuro Re-Ed/Balance, PT Therapeutic Activity, and PT Manual Therapy                Goal: LTG-By discharge, patient will transfer in/out of a car     Dates: Start: 05/13/20       Description: 1) Individualized goal:  Spv with LRAD   2) Interventions: PT Group Therapy, PT E Stim Attended, PT Gait Training, PT Therapeutic Exercises, PT Neuro Re-Ed/Balance, PT Therapeutic Activity, and PT Manual Therapy

## 2020-05-25 NOTE — CARE PLAN
"  Problem: PT-Long Term Goals  Goal: LTG-By discharge, patient will ambulate  Description: 150 ft with LRAD, spv   Outcome: NOT MET  Note: Distance limited by pt fatigue, up to about 70\"  Goal: LTG-By discharge, patient will ambulate up/down 4-6 stairs  Description: 1) Individualized goal:  2 steps with single HR and CGA   2) Interventions: PT Group Therapy, PT E Stim Attended, PT Gait Training, PT Therapeutic Exercises, PT Neuro Re-Ed/Balance, PT Therapeutic Activity, and PT Manual Therapy        Outcome: NOT MET  Goal: LTG-By discharge, patient will transfer in/out of a car  Description: 1) Individualized goal:  Spv with LRAD   2) Interventions: PT Group Therapy, PT E Stim Attended, PT Gait Training, PT Therapeutic Exercises, PT Neuro Re-Ed/Balance, PT Therapeutic Activity, and PT Manual Therapy        Outcome: NOT MET     "

## 2020-05-25 NOTE — CARE PLAN
Problem: Dressing  Goal: STG-Within one week, patient will dress UB  Description: 1) Individualized Goal:  with supervision  2) Interventions:  OT Group Therapy, OT Self Care/ADL, OT Community Reintegration, OT Manual Ther Technique, OT Neuro Re-Ed/Balance, OT Sensory Int Techniques, OT Therapeutic Activity, OT Evaluation, and OT Therapeutic Exercise    Outcome: MET     Problem: OT Long Term Goals  Goal: LTG-By discharge, patient will complete basic self care tasks  Description: 1) Individualized Goal:  with Supervision  2) Interventions:  OT Group Therapy, OT Self Care/ADL, OT Community Reintegration, OT Manual Ther Technique, OT Neuro Re-Ed/Balance, OT Sensory Int Techniques, OT Therapeutic Activity, OT Evaluation, and OT Therapeutic Exercise    Outcome: MET  Goal: LTG-By discharge, patient will perform bathroom transfers  Description: 1) Individualized Goal:  with supervision  2) Interventions:  OT Group Therapy, OT Self Care/ADL, OT Community Reintegration, OT Manual Ther Technique, OT Neuro Re-Ed/Balance, OT Sensory Int Techniques, OT Therapeutic Activity, OT Evaluation, and OT Therapeutic Exercise    Outcome: MET     Patient is able to utilize AE to assist with footwear donning/doffiing.  Supervision for shower transfers with distant supervision for showers recommended for initial discharge period.  Patient demonstrates good safety awareness and environmental awareness during standing pursuits with appropriate use of grab bars.

## 2020-05-25 NOTE — THERAPY
"Occupational Therapy  Daily Treatment     Patient Name: Sena Boyce  Age:  79 y.o., Sex:  female  Medical Record #: [id number]  Today's Date: 5/25/2020     Precautions  Precautions: (P) Fall Risk, Other (See Comments)  Comments: (P) BLE edema, LLE weakness    Safety   ADL Safety : Requires Supervision for Safety  Bathroom Safety: Requires Supervision for Safety  Comments: see below notes for ADL performance details.    Subjective    \"Look at my calves and my ankles.  This is so much better.\"  Re: KT tape/increased in lasix     Objective       05/25/20 0831   Precautions   Precautions Fall Risk;Other (See Comments)   Comments BLE edema, LLE weakness   Functional Level of Assist   Eating Modified Independent   Grooming Modified Independent   Bathing Supervision   Bathing Description Adaptive equipment;Hand held shower;Tub bench   Upper Body Dressing Modified Independent   Lower Body Dressing Modified Independent   Lower Body Dressing Description Sock aid;Dressing stick;Reacher   Toileting Modified Independent   Toileting Description Grab bar;Increased time   Toilet Transfers Supervised   Toilet Transfer Description Grab bar   Tub / Shower Transfers Supervised   Tub Shower Transfer Description Grab bar;Increased time;Shower bench   OT Total Time Spent   OT Individual Total Time Spent (Mins) 60   OT Charge Group   Charges Yes   OT Self Care / ADL 4       Assessment    Patient completed d/c shower with supervision for initial transfer and appropriate use of AE to complete total body dressing.     Plan    D/C tomorrow.  D/C FIMs completed/Care Plan updated.     Occupational Therapy Goals (Active)      There are no active problems.              "

## 2020-05-25 NOTE — THERAPY
"Physical Therapy   Daily Treatment     Patient Name: Sena Boyce  Age:  79 y.o., Sex:  female  Medical Record #: [id number]  Today's Date: 5/25/2020     Precautions  Precautions: Fall Risk, Other (See Comments)  Comments: BLE Edema    Subjective    Pt received in her room in . Her daughter Gali present and ready for FT.     Objective       05/25/20 1031   Precautions   Precautions Fall Risk;Other (See Comments)   Comments B LE edema   Cognition    Level of Consciousness Alert   Gait Functional Level of Assist    Gait Level Of Assist Contact Guard Assist   Assistive Device Front Wheel Walker   Distance (Feet) 30   # of Times Distance was Traveled 2   Deviation   (decreased tigist)   Bed Mobility    Sit to Stand Contact Guard Assist   Interdisciplinary Plan of Care Collaboration   IDT Collaboration with  Family / Caregiver   Patient Position at End of Therapy Seated;Family / Friend in Room;Other (Comments)  (transfer of care to OT)   Collaboration Comments family training   PT Total Time Spent   PT Individual Total Time Spent (Mins) 30   PT Charge Group   PT Gait Training 1   PT Therapeutic Activities 1     Gali educated in use of gait belt, donning/doffing gait belt, and positioning relative to pt while guarding pt during STS and gait with FWW.    Per pt and her daughter there are 2 steps down from driveway area to house that will not be ramped prior to dc tomorrow. PT advised that pt be \"bumped\" down the stairs in the  and instructed pt and her daughter in how to do this safely.     Pt declined to perform bed mob on standard queen bed due to her stating it is too high. Per pt she will have a hospital bed at home. PT reviewed bed adjustment to facilitate ease in getting in and out of bed, and provided visual demo.    Anticipated home DME: manual wc, FWW, hospital bed. HH nursing and therapies.    Assessment    Family training completed with pt and her daughter Gali. Gali demonstrated ability to safely guard " pt during ambulation and transfers with CGA and gait belt.     Plan    DC to home tomorrow with daughter and HH therapies.    Physical Therapy Problems     Problem: PT-Long Term Goals     Dates: Start: 05/13/20       Goal: LTG-By discharge, patient will ambulate     Dates: Start: 05/13/20       Description: 150 ft with LRAD, spv           Goal: LTG-By discharge, patient will transfer one surface to another     Dates: Start: 05/13/20       Description: Spv with LRAD           Goal: LTG-By discharge, patient will ambulate up/down 4-6 stairs     Dates: Start: 05/13/20       Description: 1) Individualized goal:  2 steps with single HR and CGA   2) Interventions: PT Group Therapy, PT E Stim Attended, PT Gait Training, PT Therapeutic Exercises, PT Neuro Re-Ed/Balance, PT Therapeutic Activity, and PT Manual Therapy                Goal: LTG-By discharge, patient will transfer in/out of a car     Dates: Start: 05/13/20       Description: 1) Individualized goal:  Spv with LRAD   2) Interventions: PT Group Therapy, PT E Stim Attended, PT Gait Training, PT Therapeutic Exercises, PT Neuro Re-Ed/Balance, PT Therapeutic Activity, and PT Manual Therapy

## 2020-05-25 NOTE — PROGRESS NOTES
Received bedside shift report from Kelin SOMMER RN regarding patient and assumed care. Patient awake, calm and stable, currently positioned in bed for comfort and safety; call light within reach. Denies pain or discomfort at this time. Will continue to monitor.

## 2020-05-26 VITALS
OXYGEN SATURATION: 93 % | BODY MASS INDEX: 35.44 KG/M2 | DIASTOLIC BLOOD PRESSURE: 67 MMHG | WEIGHT: 200 LBS | TEMPERATURE: 97.8 F | RESPIRATION RATE: 20 BRPM | HEART RATE: 66 BPM | HEIGHT: 63 IN | SYSTOLIC BLOOD PRESSURE: 130 MMHG

## 2020-05-26 PROBLEM — I24.9 ACS (ACUTE CORONARY SYNDROME) (HCC): Status: RESOLVED | Noted: 2020-05-07 | Resolved: 2020-05-26

## 2020-05-26 LAB
ANION GAP SERPL CALC-SCNC: 15 MMOL/L (ref 7–16)
BUN SERPL-MCNC: 25 MG/DL (ref 8–22)
CALCIUM SERPL-MCNC: 9.1 MG/DL (ref 8.5–10.5)
CHLORIDE SERPL-SCNC: 102 MMOL/L (ref 96–112)
CO2 SERPL-SCNC: 24 MMOL/L (ref 20–33)
CREAT SERPL-MCNC: 1.07 MG/DL (ref 0.5–1.4)
GLUCOSE SERPL-MCNC: 107 MG/DL (ref 65–99)
POTASSIUM SERPL-SCNC: 3.3 MMOL/L (ref 3.6–5.5)
SODIUM SERPL-SCNC: 141 MMOL/L (ref 135–145)

## 2020-05-26 PROCEDURE — 36415 COLL VENOUS BLD VENIPUNCTURE: CPT

## 2020-05-26 PROCEDURE — 97535 SELF CARE MNGMENT TRAINING: CPT

## 2020-05-26 PROCEDURE — 97530 THERAPEUTIC ACTIVITIES: CPT

## 2020-05-26 PROCEDURE — 97112 NEUROMUSCULAR REEDUCATION: CPT

## 2020-05-26 PROCEDURE — 80048 BASIC METABOLIC PNL TOTAL CA: CPT

## 2020-05-26 PROCEDURE — 700102 HCHG RX REV CODE 250 W/ 637 OVERRIDE(OP): Performed by: PHYSICAL MEDICINE & REHABILITATION

## 2020-05-26 PROCEDURE — A9270 NON-COVERED ITEM OR SERVICE: HCPCS | Performed by: PHYSICAL MEDICINE & REHABILITATION

## 2020-05-26 PROCEDURE — 99239 HOSP IP/OBS DSCHRG MGMT >30: CPT | Performed by: PHYSICAL MEDICINE & REHABILITATION

## 2020-05-26 RX ORDER — POTASSIUM CHLORIDE 20 MEQ/1
20 TABLET, EXTENDED RELEASE ORAL DAILY
Qty: 30 TAB | Refills: 2 | Status: SHIPPED | OUTPATIENT
Start: 2020-05-27 | End: 2020-06-01 | Stop reason: SDUPTHER

## 2020-05-26 RX ORDER — TRAZODONE HYDROCHLORIDE 50 MG/1
50 TABLET ORAL
Qty: 30 TAB | Refills: 2 | Status: SHIPPED | OUTPATIENT
Start: 2020-05-26

## 2020-05-26 RX ORDER — AMLODIPINE BESYLATE 10 MG/1
10 TABLET ORAL DAILY
Qty: 90 TAB | Refills: 2 | Status: SHIPPED | OUTPATIENT
Start: 2020-05-26 | End: 2020-06-01 | Stop reason: SDUPTHER

## 2020-05-26 RX ORDER — ATORVASTATIN CALCIUM 80 MG/1
80 TABLET, FILM COATED ORAL
Qty: 90 TAB | Refills: 2 | Status: SHIPPED | OUTPATIENT
Start: 2020-05-26

## 2020-05-26 RX ORDER — CARVEDILOL 12.5 MG/1
12.5 TABLET ORAL 2 TIMES DAILY WITH MEALS
Qty: 180 TAB | Refills: 2 | Status: SHIPPED | OUTPATIENT
Start: 2020-05-26

## 2020-05-26 RX ORDER — FUROSEMIDE 40 MG/1
40 TABLET ORAL 2 TIMES DAILY
Qty: 60 TAB | Refills: 2 | Status: SHIPPED | OUTPATIENT
Start: 2020-05-26

## 2020-05-26 RX ORDER — LEVOTHYROXINE SODIUM 0.07 MG/1
75 TABLET ORAL
Qty: 90 TAB | Refills: 2 | Status: SHIPPED | OUTPATIENT
Start: 2020-05-26

## 2020-05-26 RX ORDER — LOSARTAN POTASSIUM 25 MG/1
75 TABLET ORAL DAILY
Qty: 90 TAB | Refills: 2 | Status: SHIPPED | OUTPATIENT
Start: 2020-05-27

## 2020-05-26 RX ORDER — MIRTAZAPINE 15 MG/1
7.5 TABLET, FILM COATED ORAL
Qty: 30 TAB | Refills: 2 | Status: SHIPPED | OUTPATIENT
Start: 2020-05-26

## 2020-05-26 RX ADMIN — TICAGRELOR 90 MG: 90 TABLET ORAL at 07:56

## 2020-05-26 RX ADMIN — LOSARTAN POTASSIUM 75 MG: 25 TABLET, FILM COATED ORAL at 05:27

## 2020-05-26 RX ADMIN — LEVOTHYROXINE SODIUM 75 MCG: 75 TABLET ORAL at 05:27

## 2020-05-26 RX ADMIN — ASPIRIN 81 MG: 81 TABLET, COATED ORAL at 07:55

## 2020-05-26 RX ADMIN — FUROSEMIDE 40 MG: 40 TABLET ORAL at 13:26

## 2020-05-26 RX ADMIN — AMLODIPINE BESYLATE 10 MG: 5 TABLET ORAL at 07:56

## 2020-05-26 RX ADMIN — FUROSEMIDE 40 MG: 40 TABLET ORAL at 05:27

## 2020-05-26 RX ADMIN — CARVEDILOL 12.5 MG: 12.5 TABLET, FILM COATED ORAL at 07:55

## 2020-05-26 RX ADMIN — NYSTATIN: 100000 POWDER TOPICAL at 08:31

## 2020-05-26 RX ADMIN — OMEPRAZOLE 20 MG: 20 CAPSULE, DELAYED RELEASE ORAL at 07:55

## 2020-05-26 RX ADMIN — POTASSIUM CHLORIDE 20 MEQ: 1500 TABLET, EXTENDED RELEASE ORAL at 07:55

## 2020-05-26 ASSESSMENT — ACTIVITIES OF DAILY LIVING (ADL)
BED_CHAIR_WHEELCHAIR_TRANSFER_DESCRIPTION: ADAPTIVE EQUIPMENT;VERBAL CUEING;SUPERVISION FOR SAFETY
TOILET_TRANSFER_LEVEL_OF_ASSIST: ABLE TO COMPLETE TOILET TRANSFER WITHOUT ASSIST
SHOWER_TRANSFER_LEVEL_OF_ASSIST: REQUIRES SUPERVISION WITH SHOWER TRANSFER
TOILETING_LEVEL_OF_ASSIST: ABLE TO COMPLETE TOILETING WITHOUT ASSIST

## 2020-05-26 ASSESSMENT — GAIT ASSESSMENTS: GAIT LEVEL OF ASSIST: REFUSED

## 2020-05-26 NOTE — DISCHARGE PLANNING
Case Management Discharge Instructions        Discharge Location: Home with Home Health     Agency Name / Address / Phone: [facility]  [phone] [address] [place] [address]   Home Health: Registered Nurse, Home Health Aide, Occupational Therapist, Physical Therapist,   Comments: Home health will call to set up initial appointment       Medical Equipment Provider / Phone: [facility]  [phone]    Medical Equipment Ordered: Front Wheel Walker, Hospital Bed, Wheelchair     Follow-up Information:    Dr. Sandie Flores  [phone]  Primary Care Provider    [facility]    [address]  [address]    [place] [address]   Friday 5.29.2020, virtual appointment with Dr. Simon Hernandez at 11:00am.     We will fax records to office.      Shea Greer P.A.-C.  [phone]  Cardiology    [address]  [address]    [facility] [address]   Monday 6.1.2020, appointment at 1:00pm.     Please wear mask to appointment.

## 2020-05-26 NOTE — THERAPY
"Occupational Therapy  Daily Treatment     Patient Name: Sena Boyce  Age:  79 y.o., Sex:  female  Medical Record #: [id number]  Today's Date: 5/26/2020     Precautions  Precautions: Fall Risk, Other (See Comments)  Comments: B LE edema    Safety   ADL Safety : Requires Supervision for Safety  Bathroom Safety: Requires Supervision for Safety  Comments: see below notes for ADL performance details.    Subjective    \"My legs are going to give out.\" Pt stated, however, did not appear visibly fatigued.     Objective       05/26/20 0931   Non Verbal Descriptors   Non Verbal Scale  Calm   Cognition    Level of Consciousness Alert   Sleep/Wake Cycle   Sleep & Rest Out of bed;Awake   Functional Level of Assist   Bed, Chair, Wheelchair Transfer Stand by Assist   Bed Chair Wheelchair Transfer Description Adaptive equipment;Verbal cueing;Supervision for safety   Balance   Comments walked 50 feet with FWW and Supervision. Rest break. 50 more feet.   Interdisciplinary Plan of Care Collaboration   Patient Position at End of Therapy Call Light within Reach;Tray Table within Reach;Phone within Reach;Seated   OT Total Time Spent   OT Individual Total Time Spent (Mins) 30   OT Charge Group   OT Self Care / ADL 1   OT Neuromuscular Re-education / Balance 1     Discussed home set up, specifically ramp in to home, in great detail. Pt under impression that she is going to have 3 men carry her up the ramp. OTR explained to pt that she is capable of walking around 100 feet and can take rest breaks.     Assessment    Pt continues to be self limiting due to fear of falling. Pt states she feels prepared to go home, except would like to feel more confident with entering and exiting her home. She feels comfortable with others present, but not on her own. Discussed that home health therapy could work on this with pt.    Plan    D/c    Occupational Therapy Goals (Active)      There are no active problems.              "

## 2020-05-26 NOTE — PROGRESS NOTES
Pt discharge at 1527. Escorted out with all belongings including cell phone and charge and wheelchair with RN and CNA. RN had finished discharge paperwork with patient and patient signed and verbalized understanding. Rn went over medications, information pertaining to patient specific diagnosis, skin care, fall prevention, and when to call the doctor/when something needs emergent care. Shelbie pharmacist spoke to patient and daughter. María Elena  spoke to patient and daughter.

## 2020-05-26 NOTE — PROGRESS NOTES
Received bedside shift report from Kelin SOMMER RN egarding patient and assumed care. Patient awake, calm and stable, currently positioned in bed for comfort and safety; call light within reach. Denies pain or discomfort at this time. Will continue to monitor.

## 2020-05-26 NOTE — THERAPY
Physical Therapy   Daily Treatment     Patient Name: Sena Boyce  Age:  79 y.o., Sex:  female  Medical Record #: [id number]  Today's Date: 5/26/2020     Precautions  Precautions: (P) Fall Risk, Other (See Comments)  Comments: (P) B LE edema     Subjective    Patient reported that her family was waiting delivery of her hospital bed, then would be here to pick her up for D/C.  Patient refused practice of all functional skills including car, steps, bed transfer, and gait.  Patient reported that she will not longer live alone.  After her daughter leaves, her good friend will move in permanently. She plans to be bumped up the 2 steps in her wc (family has done this in the past), and install a ramp shortly. Patient plans to obtain a motorized scooter for mobility in/ out of home after installation of the ramp is completed. Patient reported that her feet are less swollen since application on kinesiotaping. Patient preferred to save her energy for D/C, and not engage in functional mobility training.     Objective       05/26/20 0901   Precautions   Precautions Fall Risk;Other (See Comments)   Comments B LE edema    Gait Functional Level of Assist    Gait Level Of Assist Refused   Stairs Functional Level of Assist   Level of Assist with Stairs Refused   Transfer Functional Level of Assist   Bed, Chair, Wheelchair Transfer Refused   Bed Mobility    Sit to Stand Refused   Neuro-Muscular Treatments   Comments Discussed HEP, reviewed D/C recommendations. Home safety/ fall prevention handouts reviewed/ provided.    Interdisciplinary Plan of Care Collaboration   IDT Collaboration with  Nursing   Collaboration Comments POC   PT Total Time Spent   PT Individual Total Time Spent (Mins) 30   PT Charge Group   PT Therapeutic Activities 2       Assessment    Patient was receptive to home safety and fall prevention education/ review. Patient refused functional skill review and practice.    Plan    D/C patient with family and HEP, Home  Health PT.     Physical Therapy Problems (Active)      There are no active problems.

## 2020-05-26 NOTE — CARE PLAN
"  Problem: PT-Long Term Goals  Goal: LTG-By discharge, patient will ambulate  Description: 150 ft with LRAD, spv   5/26/2020 1603 by Dayan Enciso DPT  Outcome: NOT MET  Note: 70 ft SBA FWW, impaired endurance    5/26/2020 1603 by Dayan Enciso, DPT  Reactivated  Goal: LTG-By discharge, patient will ambulate up/down 4-6 stairs  Description: 1) Individualized goal:  2 steps with single HR and CGA   2) Interventions: PT Group Therapy, PT E Stim Attended, PT Gait Training, PT Therapeutic Exercises, PT Neuro Re-Ed/Balance, PT Therapeutic Activity, and PT Manual Therapy        5/26/2020 1603 by Dayan Enciso DPT  Outcome: NOT MET  Note: Refused; plans to have family \"bump\" her up/down in wc  5/26/2020 1603 by Dayan Enciso DPT  Reactivated  Goal: LTG-By discharge, patient will transfer in/out of a car  Description: 1) Individualized goal:  Spv with LRAD   2) Interventions: PT Group Therapy, PT E Stim Attended, PT Gait Training, PT Therapeutic Exercises, PT Neuro Re-Ed/Balance, PT Therapeutic Activity, and PT Manual Therapy        5/26/2020 1603 by Dayan Enciso DPT  Outcome: NOT MET  Note: Refused training  5/26/2020 1603 by Dayan Enciso DPT  Reactivated     "

## 2020-05-26 NOTE — DISCHARGE SUMMARY
Rehab Discharge Summary    Admission Date: 5/12/2020    Discharge Date: 5/26/2020    Attending Provider: Ever Longo MD/PhD    Admission Diagnosis:   Active Hospital Problems    Diagnosis   • Obesity, Class II, BMI 35-39.9   • Hypercholesteremia   • History of coronary angioplasty with insertion of stent   • Acquired hypothyroidism   • Essential hypertension       Discharge Diagnosis:  Active Hospital Problems    Diagnosis   • Obesity, Class II, BMI 35-39.9   • Hypercholesteremia   • History of coronary angioplasty with insertion of stent   • Acquired hypothyroidism   • Essential hypertension       HPI per H&P:  The patient is a 79 y.o. 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 Patient initially presented to [facility] where she had uptrending troponins so she was transferred to [facility]. Patient underwent left heart catheterization with Dr. Olivas on 5/8/20 with FELIPE x3 in mid left anterior descending, proximal left anterior descending, and left posterolateral coronary arteries.  TTE with EF of 65 with mild aortic stenosis.  Patient has been maintained on ASA and ticagrelor as well as statin and beta blocker.  Her hospital stay was complicated by LE weakness and decreased mobility.    Patient was admitted to [facility] on 5/12/2020.     Hospital Course by Problem List:  ACS s/p FELIPE x3 - presented from OSH with chest pain, s/p stenting on 5/8/20 with Dr. Olivas. Continue ASA, Brilinta. Patient underwent acute inpatient rehabilitation from 5/12/20 to 5/26/20 with good improvement in mobility and ADLs.   -Follow-up cardiology, PCP     HTN - Patient on Coreg 12.5 mg BID, Lisinopril 15 mg daily. Previously on Amlodipine and Valsartan. TTE with EF of 65 and grade 2 diastolic dysfunction. Elevated on  Admission, restart Amlodipine. Switch from ACEi to ARB with cough. Increase Losartan to 75 mg.   -Start Lasix 40 mg in AM. Check AM Labs - K 3.5. Increase to BID. Follow-up PCP and cardiology  -Patient unable to lie flat without desaturating due to diastolic dysfunction, needs hospital bed for increased HOB.      HLD - Patient on Atorvastatin 80 mg daily     Cough - New cough over weekend, check CXR 5/18/20 and start on Mucinex   -CXR - wnl. Cough improved on Mucinex, will switch ACEi to ARB. Improved discontinue Mucinex     Anemia - mild on admission labs at 11.6, continue to monitor     Hypokalemia - 3.1 on admission. Start K supplement, recheck on 5/18/20 - 3.5   -Starting on Lasix, restart K supplement. Check BMP - 3.5, stable     Mood - Patient on Remeron 7.5 mg qHS at home.      Vitamin D deficiency - 22 on admission start 1000 U    Obesity, class II - BMI of 35.43 on admission. Dietitian to consult    GI - Start on Omeprazole. Hold bowel medications     DVT Ppx - Patient on Lovenox on transfer. Ambulating > 125 feet, discontinue Lovenox    Functional Status at Discharge  Eating:     Eating Description:     Grooming:     Grooming Description:     Bathing:     Bathing Description:     Upper Body Dressing:     Upper Body Dressing Description:     Lower Body Dressing:     Lower Body Dressing Description:     Discharge Location : Home  Patient Discharging with Assist of: Family   Level of Supervision Required: Intermittent Supervision  Recommended Equipment for Discharge: Front-Wheeled Walker;Shower Chair;Grab Bars by Toilet;Grab Bars in Tub / Shower;Sock Aid;Reacher;Dressing Stick;Hand Held Shower Head  Recommended Services Upon Discharge: Home Health Occupational Therapy  Long Term Goals Met: 2  Long Term Goals Not Met: 0  Criteria for Termination of Services: Maximum Function Achieved for Inpatient Rehabilitation  Walk:     Distance Walked:     Walk Description:     Wheelchair:     Distance Propelled:       Wheelchair Description:     Stairs    Stairs Description   Discharge Location: Home  Patient Discharging with Assist of: Family  Level of Supervision Required Upon Discharge: Intermittent Supervision  Recommended Equipment for Discharge: Front-Wheeled Walker;Manual Wheelchair;Ramp  Recommeded Services Upon Discharge: Home Health Physical Therapy  Long Term Goals Met: 1  Long Term Goals Not Met: 3  Criteria for Termination of Services: Maximum Function Achieved for Inpatient Rehabilitation  Comprehension Mode:     Comprehension:     Comprehension Description:     Expression Mode:     Expression:     Expression Description:     Social Interaction:     Social Interaction Description:     Problem Solving:     Problem Solving Description:     Memory:     Memory Description:          Ever WHITE M.D., personally performed a complete drug regimen review and no potential clinically significant medication issues were identified.   Discharge Medication:     Medication List      START taking these medications      Instructions   furosemide 40 MG Tabs  Commonly known as:  LASIX  Notes to patient:  Diuretic   Take 1 Tab by mouth 2 Times a Day.  Dose:  40 mg     losartan 25 MG Tabs  Start taking on:  May 27, 2020  Commonly known as:  COZAAR  Notes to patient:  HEART RATE AND BLOOD PRESSURE   Take 3 Tabs by mouth every day.  Dose:  75 mg     potassium chloride SA 20 MEQ Tbcr  Start taking on:  May 27, 2020  Commonly known as:  Kdur   Take 1 Tab by mouth every day.  Dose:  20 mEq     traZODone 50 MG Tabs  Commonly known as:  DESYREL   Take 1 Tab by mouth at bedtime as needed.  Dose:  50 mg        CHANGE how you take these medications      Instructions   aspirin EC 81 MG Tbec  What changed:  Another medication with the same name was removed. Continue taking this medication, and follow the directions you see here.  Commonly known as:  ECOTRIN   Take 1 Tab by mouth every day.  Dose:  81 mg     atorvastatin 80 MG  tablet  What changed:    · medication strength  · when to take this  Commonly known as:  LIPITOR   Take 1 Tab by mouth every bedtime.  Dose:  80 mg     mirtazapine 15 MG Tabs  What changed:  when to take this  Commonly known as:  REMERON   Take 0.5 Tabs by mouth every bedtime.  Dose:  7.5 mg        CONTINUE taking these medications      Instructions   amLODIPine 10 MG Tabs  Commonly known as:  NORVASC   Take 1 Tab by mouth every day.  Dose:  10 mg     carvedilol 12.5 MG Tabs  Commonly known as:  COREG   Take 1 Tab by mouth 2 times a day, with meals.  Dose:  12.5 mg     levothyroxine 75 MCG Tabs  Commonly known as:  SYNTHROID   Take 1 Tab by mouth Every morning on an empty stomach.  Dose:  75 mcg     ticagrelor 90 MG Tabs tablet  Commonly known as:  BRILINTA   Take 1 Tab by mouth 2 Times a Day.  Dose:  90 mg        STOP taking these medications    bumetanide 1 MG Tabs  Commonly known as:  BUMEX     celecoxib 200 MG Caps  Commonly known as:  CELEBREX     indomethacin 25 MG Caps  Commonly known as:  INDOCIN     metoclopramide 10 MG Tabs  Commonly known as:  REGLAN     Non Formulary Request     Non Formulary Request     oxyCODONE-acetaminophen 7.5-325 MG per tablet  Commonly known as:  PERCOCET     potassium chloride 20 MEQ Pack  Commonly known as:  KLOR-CON     valsartan 80 MG Tabs  Commonly known as:  DIOVAN     vitamin D (Ergocalciferol) 1.25 MG (82207 UT) Caps capsule  Commonly known as:  DRISDOL            Discharge Diet:  Regular    Discharge Activity:  As tolerated     Disposition:  Patient to discharge home with family support and community resources.     Equipment:  FWW    Follow-up & Discharge Instructions:  Follow up with your primary care provider (PCP) within 7-10 days of discharge to review your medications and take over your care.     If you develop chest pain, fever, chills, change in neurologic function (weakness, sensation changes, vision changes), or other concerning sxs, seek immediate medical  attention or call 911.      Condition on Discharge:  Good    More than 33 minutes was spent on discharging this patient, including face-to-face time, prescription management, and the dictation of this note.    Ever Longo M.D.    Date of Service: 5/26/2020

## 2020-05-26 NOTE — DISCHARGE INSTRUCTIONS
[facility] NURSING DISCHARGE INSTRUCTIONS    Blood Pressure : 122/74  Weight: 90.7 kg (200 lb)  Nursing recommendations for Laura Boyce at time of discharge are as follows:  Client verbalized understanding of all discharge instructions and prescriptions.     Review all your home medications and newly ordered medications with your doctor and/or pharmacist. Follow medication instructions as directed by your doctor and/or pharmacist.    Pain Management:   Discharge Pain Medication Instructions:  Comfort Goal: Comfort at Rest  Notify your primary care provider if pain is unrelieved with these measures, if the pain is new, or increased in intensity.    Discharge Skin Characteristics:    Discharge Skin Exam:       Skin / Wound Care Instructions: Please contact your primary care physician for any change in skin integrity.     If You Have Surgical Incisions / Wounds:  Monitor surgical site(s) for signs of increased swelling, redness or symptoms of drainage from the site or fever as this could indicate signs and symptoms of infection. If these symptoms are noted, notifiy your primary care provider.      Discharge Safety Instructions:       Discharge Safety Concerns:    The interdisciplinary team has made recommendation that you should have adult supervision in the house due to weakness and unsteady gait  Anti-embolic stockings are required during the day and off at night to increase circulation to the lower extremities.    Discharge Diet:       Discharge Liquids:    Discharge Bowel Function:    Please contact your primary care physician for any changes in bowel habits.  Discharge Bowel Program:    Discharge Bladder Function:    Discharge Urinary Devices:        Nursing Discharge Plan:        Case Management Discharge Instructions:   Discharge Location:    Agency Name/Address/Phone:    Home Health:    Outpatient Services:    DME Provider/Phone:    Medical Equipment Ordered:    Prescription Faxed to:         Discharge Medication Instructions:  Below are the medications your physician expects you to take upon discharge:              Coronary Angiogram With Stent  Coronary angiogram with stent placement is a procedure to widen or open a narrow blood vessel of the heart (coronary artery). Arteries may become blocked by cholesterol buildup (plaques) in the lining or wall. When a coronary artery becomes partially blocked, blood flow to that area decreases. This may lead to chest pain or a heart attack (myocardial infarction).  A stent is a small piece of metal that looks like mesh or a spring. Stent placement may be done as treatment for a heart attack or right after a coronary angiogram in which a blocked artery is found.  Let your health care provider know about:  · Any allergies you have.  · All medicines you are taking, including vitamins, herbs, eye drops, creams, and over-the-counter medicines.  · Any problems you or family members have had with anesthetic medicines.  · Any blood disorders you have.  · Any surgeries you have had.  · Any medical conditions you have.  · Whether you are pregnant or may be pregnant.  What are the risks?  Generally, this is a safe procedure. However, problems may occur, including:  · Damage to the heart or its blood vessels.  · A return of blockage.  · Bleeding, infection, or bruising at the insertion site.  · A collection of blood under the skin (hematoma) at the insertion site.  · A blood clot in another part of the body.  · Kidney injury.  · Allergic reaction to the dye or contrast that is used.  · Bleeding into the abdomen (retroperitoneal bleeding).  What happens before the procedure?  Staying hydrated   Follow instructions from your health care provider about hydration, which may include:  · Up to 2 hours before the procedure - you may continue to drink clear liquids, such as water, clear fruit juice, black coffee, and plain tea.  Eating and drinking restrictions   Follow  instructions from your health care provider about eating and drinking, which may include:  · 8 hours before the procedure - stop eating heavy meals or foods such as meat, fried foods, or fatty foods.  · 6 hours before the procedure - stop eating light meals or foods, such as toast or cereal.  · 2 hours before the procedure - stop drinking clear liquids.  Ask your health care provider about:  · Changing or stopping your regular medicines. This is especially important if you are taking diabetes medicines or blood thinners.  · Taking medicines such as ibuprofen. These medicines can thin your blood. Do not take these medicines before your procedure if your health care provider instructs you not to. Generally, aspirin is recommended before a procedure of passing a small, thin tube (catheter) through a blood vessel and into the heart (cardiac catheterization).  What happens during the procedure?  · An IV tube will be inserted into one of your veins.  · You will be given one or more of the following:  ¨ A medicine to help you relax (sedative).  ¨ A medicine to numb the area where the catheter will be inserted into an artery (local anesthetic).  · To reduce your risk of infection:  ¨ Your health care team will wash or sanitize their hands.  ¨ Your skin will be washed with soap.  ¨ Hair may be removed from the area where the catheter will be inserted.  · Using a guide wire, the catheter will be inserted into an artery. The location may be in your groin, in your wrist, or in the fold of your arm (near your elbow).  · A type of X-ray (fluoroscopy) will be used to help guide the catheter to the opening of the arteries in the heart.  · A dye will be injected into the catheter, and X-rays will be taken. The dye will help to show where any narrowing or blockages are located in the arteries.  · A tiny wire will be guided to the blocked spot, and a balloon will be inflated to make the artery wider.  · The stent will be expanded and  will crush the plaques into the wall of the vessel. The stent will hold the area open and improve the blood flow. Most stents have a drug coating to reduce the risk of the stent narrowing over time.  · The artery may be made wider using a drill, laser, or other tools to remove plaques.  · When the blood flow is better, the catheter will be removed. The lining of the artery will grow over the stent, which stays where it was placed.  This procedure may vary among health care providers and hospitals.  What happens after the procedure?  · If the procedure is done through the leg, you will be kept in bed lying flat for about 6 hours. You will be instructed to not bend and not cross your legs.  · The insertion site will be checked frequently.  · The pulse in your foot or wrist will be checked frequently.  · You may have additional blood tests, X-rays, and a test that records the electrical activity of your heart (electrocardiogram, or ECG).  This information is not intended to replace advice given to you by your health care provider. Make sure you discuss any questions you have with your health care provider.  Document Released: 06/23/2004 Document Revised: 08/17/2017 Document Reviewed: 07/23/2017  [employer] Interactive Patient Education © 2017 [employer]      Acute Coronary Syndrome Patient Discharge Instructions  Acute coronary syndrome (ACS) is an urgent problem in which the blood and oxygen supply to the heart is critically deficient. ACS requires hospitalization because one or more coronary arteries may be blocked.     ACS represents a range of conditions including:  · Previous angina that is now unstable, lasts longer, happens at rest or is more intense.  · A heart attack, with heart muscle cell injury and death.     There are three vital coronary arteries that supply the heart muscle with blood and oxygen so that it can pump blood effectively. If blockages to these arteries develop, blood flow to the heart muscle  is reduced. If the heart does not get enough blood, angina may occur as the first warning sign    Symptoms:  · The most common signs of angina include:  · Tightness or squeezing in the chest.  · Feeling of heaviness on the chest.  · Discomfort in the arms, neck or jaw.  · Shortness of breath and nausea.  · Cold, wet skin.  · Angina is usually brought on by physical effort or excitement which increase the oxygen needs of the heart . These states increase the blood flow needs of the heart beyond what can be delivered.    Treatment   · Medicines to help discomfort may include nitroglycerin (nitro) in the form of tablets or a spray for rapid relief, or longer acting forms such as cream, patches or capsules. (Be aware that there are many side effects and possible interactions with other drugs )  · Other medicines may be used to help the heart pump better.  · Procedures to open blocked arteries including angioplasty or stent placement to keep the arteries open.  · Open heart surgery may be needed when there are many blockages or they are in critical locations that are best treated with surgery.    Home Care Instructions  · Avoid smoking.  · Take one baby or adult aspirin daily if your caregiver advises to help reduces the risk of a heart attack.   · It is very important that you follow the angina treatment prescribed by your caregiver. Make arrangements for proper follow-up care.  · Eat properly and exercise. Your caregivers can help you with this.   · You should tell your doctor right away about any increase in the severity or frequency of your chest discomfort or angina attacks. When you have angina, you should stop what you are doing and sit down. This may bring relief in 3-5 minutes. If your doctor has prescribed nitro, take it as directed.   · If your caregiver has given you a follow-up appointment, it is very important to keep that appointment. Not keeping the appointment could result in a chronic or permanent  injury, pain, and disability. If there is any problem keeping the appointment, you must call back to this facility for assistance.      SEEK IMMEDIATE MEDICAL CARE IF:   · You develop nausea, vomiting or shortness of breath.  · You feel faint, light headed or pass out.  · Your chest discomfort gets worse.  · You are sweating or experience sudden profound fatigue.  · You do not get relief of your chest pain after 3 doses of nitro.  · Your discomfort lasts longer than 15 minutes.    Make sure you:    · Understand these instructions.   · Will watch your condition.  · Will get help right away if you are not doing well or get worse.     Document Released: 12/18/2006  Document Re-Released: 11/30/2009  [employer]® Patient Information ©2010 [employer].    Depression, Adult  Depression is feeling sad, low, down in the dumps, blue, gloomy, or empty. In general, there are two kinds of depression:  · Normal sadness or grief. This can happen after something upsetting. It often goes away on its own within 2 weeks. After losing a loved one (bereavement), normal sadness and grief may last longer than two weeks. It usually gets better with time.  · Clinical depression. This kind lasts longer than normal sadness or grief. It keeps you from doing the things you normally do in life. It is often hard to function at home, work, or at school. It may affect your relationships with others. Treatment is often needed.  GET HELP RIGHT AWAY IF:  · You have thoughts about hurting yourself or others.  · You lose touch with reality (psychotic symptoms). You may:  ¨ See or hear things that are not real.  ¨ Have untrue beliefs about your life or people around you.  · Your medicine is giving you problems.  MAKE SURE YOU:  · Understand these instructions.  · Will watch your condition.  · Will get help right away if you are not doing well or get worse.     This information is not intended to replace advice given to you by your health care provider.  Make sure you discuss any questions you have with your health care provider.     Document Released: 01/20/2012 Document Revised: 01/08/2016 Document Reviewed: 04/18/2013  [employer] Interactive Patient Education ©2016 [employer]    Fall Prevention in the Home  Introduction  Falls can cause injuries. They can happen to people of all ages. There are many things you can do to make your home safe and to help prevent falls.  What can I do on the outside of my home?  · Regularly fix the edges of walkways and driveways and fix any cracks.  · Remove anything that might make you trip as you walk through a door, such as a raised step or threshold.  · Trim any bushes or trees on the path to your home.  · Use bright outdoor lighting.  · Clear any walking paths of anything that might make someone trip, such as rocks or tools.  · Regularly check to see if handrails are loose or broken. Make sure that both sides of any steps have handrails.  · Any raised decks and porches should have guardrails on the edges.  · Have any leaves, snow, or ice cleared regularly.  · Use sand or salt on walking paths during winter.  · Clean up any spills in your garage right away. This includes oil or grease spills.  What can I do in the bathroom?  · Use night lights.  · Install grab bars by the toilet and in the tub and shower. Do not use towel bars as grab bars.  · Use non-skid mats or decals in the tub or shower.  · If you need to sit down in the shower, use a plastic, non-slip stool.  · Keep the floor dry. Clean up any water that spills on the floor as soon as it happens.  · Remove soap buildup in the tub or shower regularly.  · Attach bath mats securely with double-sided non-slip rug tape.  · Do not have throw rugs and other things on the floor that can make you trip.  What can I do in the bedroom?  · Use night lights.  · Make sure that you have a light by your bed that is easy to reach.  · Do not use any sheets or blankets that are too big for  your bed. They should not hang down onto the floor.  · Have a firm chair that has side arms. You can use this for support while you get dressed.  · Do not have throw rugs and other things on the floor that can make you trip.  What can I do in the kitchen?  · Clean up any spills right away.  · Avoid walking on wet floors.  · Keep items that you use a lot in easy-to-reach places.  · If you need to reach something above you, use a strong step stool that has a grab bar.  · Keep electrical cords out of the way.  · Do not use floor polish or wax that makes floors slippery. If you must use wax, use non-skid floor wax.  · Do not have throw rugs and other things on the floor that can make you trip.  What can I do with my stairs?  · Do not leave any items on the stairs.  · Make sure that there are handrails on both sides of the stairs and use them. Fix handrails that are broken or loose. Make sure that handrails are as long as the stairways.  · Check any carpeting to make sure that it is firmly attached to the stairs. Fix any carpet that is loose or worn.  · Avoid having throw rugs at the top or bottom of the stairs. If you do have throw rugs, attach them to the floor with carpet tape.  · Make sure that you have a light switch at the top of the stairs and the bottom of the stairs. If you do not have them, ask someone to add them for you.  What else can I do to help prevent falls?  · Wear shoes that:  ¨ Do not have high heels.  ¨ Have rubber bottoms.  ¨ Are comfortable and fit you well.  ¨ Are closed at the toe. Do not wear sandals.  · If you use a stepladder:  ¨ Make sure that it is fully opened. Do not climb a closed stepladder.  ¨ Make sure that both sides of the stepladder are locked into place.  ¨ Ask someone to hold it for you, if possible.  · Clearly uriel and make sure that you can see:  ¨ Any grab bars or handrails.  ¨ First and last steps.  ¨ Where the edge of each step is.  · Use tools that help you move around  (mobility aids) if they are needed. These include:  ¨ Canes.  ¨ Walkers.  ¨ Scooters.  ¨ Crutches.  · Turn on the lights when you go into a dark area. Replace any light bulbs as soon as they burn out.  · Set up your furniture so you have a clear path. Avoid moving your furniture around.  · If any of your floors are uneven, fix them.  · If there are any pets around you, be aware of where they are.  · Review your medicines with your doctor. Some medicines can make you feel dizzy. This can increase your chance of falling.  Ask your doctor what other things that you can do to help prevent falls.  This information is not intended to replace advice given to you by your health care provider. Make sure you discuss any questions you have with your health care provider.  Document Released: 10/14/2010 Document Revised: 05/25/2017 Document Reviewed: 01/22/2016  © 2017 [employer]      Physical Therapy Discharge Instructions for Laura Boyce    5/25/2020    Weight Bearing Status - Patient Should: (no weight bearing restrictions)  Level of Assist Required for Ambulation: Assist for Balance on Flat Surfaces, Should Not Attempt Curbs at This Time, Should Not Attempt Stairs at This Time  Device Recommended for Ambulation: Front-Wheeled Walker  Level of Assist Required to Propel Wheelchair: Intermittent Physical Assist  Level of Assist Required for Transfers: Physical Assist(contact guard with gait belt)  Device Recommended for Transfers: Front-Wheeled Walker  Home Exercise Program: Refer to Home Exercise Program Handout for Details  Prosthesis / Orthosis Recommendation / Location: No Prosthesis  or Orthosis Recommended    Occupational Therapy Discharge Instructions for Dayan Hanley,  It has been a pleasure to work with you. You have made so many improvements. You are now able to go to the bathroom on your own with the grab bars and walker. You are able to get in and out of bed on your own here, but should have your daughter be  with you the first few times since it will be a different hospital bed. I do suggest having your daughter with you to get in and out of the shower. You are able to wash yourself, but having her nearby would be beneficial for you for the first few times. You are now able to walk over a hundred feet with the front wheeled walker. I do not suggest you using your 4 Wheeled Walker until your new therapists approve you for it. Please continue to use the Reacher, Sock Aid, Leg , and Dressing Stick to help you get dressed and get in and out of bed. I provided handouts for finding them online. Keep up the hard work!   Marlyn Verde, OTR/L, CTRS

## 2020-05-26 NOTE — CARE PLAN
Pt tolerating tape on legs placed by therapist. Swelling appears to have decreased slightly in ble. Becoming more knowledgeable about medications, evidenced by able to state what meds are all for. Will continue education as patient is nervous about med management for discharge.     Problem: Venous Thromboembolism (VTW)/Deep Vein Thrombosis (DVT) Prevention:  Goal: Patient will participate in Venous Thrombosis (VTE)/Deep Vein Thrombosis (DVT)Prevention Measures  Outcome: PROGRESSING AS EXPECTED     Problem: Knowledge Deficit  Goal: Knowledge of disease process/condition, treatment plan, diagnostic tests, and medications will improve  Outcome: PROGRESSING AS EXPECTED  Goal: Knowledge of the prescribed therapeutic regimen will improve  Outcome: PROGRESSING AS EXPECTED

## 2020-05-27 NOTE — DISCHARGE PLANNING
Case management Summary:   Met with patient & daughter, Gali, prior to discharge.   Reviewed all follow up appointments: PCP & Cardiology.   Referral made to [facility] and they are have accepted referral and are ready to follow.    [employer] has delivered FWW, hospital bed & manual wheelchair w/ cushion to patient.    Family training completed.    During hospitalization, I have provided support and education and have been available for questions and information during hours of operation, communicated with therapy team and MD along with providing links/resources  to outside services.    Patient verbalizes agreement with all plans and has an understanding of the next steps within the post acute services.     Individualized Goals:   1. Gain strength  2. Gain mobility  3. Gain independence    Outcome:   1. Goal partially met: patient has made improvement since rehab admit, but is not at baseline. It is anticipated she will continue to improve w/ OP mgmt. HH referral in place.  2. Goal partially met: patient has made improvement since rehab admit. It is anticipated she will continue to improve w/ OP mgmt. HH referral in place.  3. Goal met & completed.

## 2020-05-28 ENCOUNTER — TELEPHONE (OUTPATIENT)
Dept: CARDIOLOGY | Facility: MEDICAL CENTER | Age: 80
End: 2020-05-28

## 2020-05-28 NOTE — PROGRESS NOTES
Chief Complaint   Patient presents with   • Hypertension       Subjective:   Laura Boyce is a 79 y.o. female who presents today for hospital follow up. This encounter was conducted via Zoom . Verbal consent was obtained. Patient's identity was verified.      Patient of Dr. Jiang. Current medical problems include dyslipidemia, HTN, coronary artery disease. Transferred from [facility] 5/8/20 for NSTEMI, cath showed 2 vessel CAD s/p FELIPE x2 to proximal to mid LAD and FELIPE to prox L posterolateral.     Her blood pressure has been fluctuating at home, sometimes are very low systolic 80s, although generally 90-100s. She is occasionally lightheaded. She used to have high blood pressures before her stents.     Only urinating twice a day. Drinking about 2 glasses of water a day.     Weight at home is 184lbs, she was 186 when she discharged from the hospital.      She has leg swelling, gets short of breath when she lays flat on her back.     No chest pain or pressure.       Past Medical History:   Diagnosis Date   • Gout    • Heart attack (HCC)     [id number]   • High blood pressure    • History of cholecystectomy    • Hypercholesteremia    • Hypothyroidism    • Polio     as a kid     Past Surgical History:   Procedure Laterality Date   • HYSTERECTOMY ROBOTIC  5/1/2015    Performed by Chuck Palmer M.D. at SURGERY [facility]   • OOPHORECTOMY  5/1/2015    Performed by Chuck Palmer M.D. at SURGERY [facility]     History reviewed. No pertinent family history.  Social History     Socioeconomic History   • Marital status:      Spouse name: Not on file   • Number of children: Not on file   • Years of education: Not on file   • Highest education level: Not on file   Occupational History   • Not on file   Social Needs   • Financial resource strain: Not on file   • Food insecurity     Worry: Not on file     Inability: Not on file   • Transportation needs     Medical: Not on file     Non-medical: Not on file    Tobacco Use   • Smoking status: Never Smoker   • Smokeless tobacco: Never Used   Substance and Sexual Activity   • Alcohol use: Not Currently   • Drug use: Never   • Sexual activity: Not on file   Lifestyle   • Physical activity     Days per week: Not on file     Minutes per session: Not on file   • Stress: Not on file   Relationships   • Social connections     Talks on phone: Not on file     Gets together: Not on file     Attends Yarsani service: Not on file     Active member of club or organization: Not on file     Attends meetings of clubs or organizations: Not on file     Relationship status: Not on file   • Intimate partner violence     Fear of current or ex partner: Not on file     Emotionally abused: Not on file     Physically abused: Not on file     Forced sexual activity: Not on file   Other Topics Concern   • Not on file   Social History Narrative   • Not on file     Allergies   Allergen Reactions   • Allopurinol      Rash, swollen   • Colchicine Rash     Outpatient Encounter Medications as of 6/1/2020   Medication Sig Dispense Refill   • amLODIPine (NORVASC) 5 MG Tab Take 1 Tab by mouth every day. 90 Tab 3   • potassium chloride SA (KDUR) 20 MEQ Tab CR Take 1 Tab by mouth 2 times a day. 60 Tab 3   • aspirin EC (ECOTRIN) 81 MG Tablet Delayed Response Take 1 Tab by mouth every day. 100 Tab 2   • atorvastatin (LIPITOR) 80 MG tablet Take 1 Tab by mouth every bedtime. 90 Tab 2   • carvedilol (COREG) 12.5 MG Tab Take 1 Tab by mouth 2 times a day, with meals. 180 Tab 2   • furosemide (LASIX) 40 MG Tab Take 1 Tab by mouth 2 Times a Day. 60 Tab 2   • levothyroxine (SYNTHROID) 75 MCG Tab Take 1 Tab by mouth Every morning on an empty stomach. 90 Tab 2   • losartan (COZAAR) 25 MG Tab Take 3 Tabs by mouth every day. 90 Tab 2   • mirtazapine (REMERON) 15 MG Tab Take 0.5 Tabs by mouth every bedtime. 30 Tab 2   • ticagrelor (BRILINTA) 90 MG Tab tablet Take 1 Tab by mouth 2 Times a Day. 180 Tab 2   • traZODone  "(DESYREL) 50 MG Tab Take 1 Tab by mouth at bedtime as needed. 30 Tab 2   • [DISCONTINUED] amLODIPine (NORVASC) 10 MG Tab Take 1 Tab by mouth every day. 90 Tab 2   • [DISCONTINUED] potassium chloride SA (KDUR) 20 MEQ Tab CR Take 1 Tab by mouth every day. 30 Tab 2     No facility-administered encounter medications on file as of 6/1/2020.      Review of Systems   Constitutional: Positive for weight loss. Negative for malaise/fatigue.   Respiratory: Negative for cough and shortness of breath.    Cardiovascular: Positive for orthopnea and leg swelling. Negative for chest pain and palpitations.   Gastrointestinal: Negative for melena.   Genitourinary: Negative for hematuria.   Musculoskeletal: Negative for falls.   Neurological: Positive for dizziness.        Objective:   /50 (BP Location: Left arm, Patient Position: Sitting, BP Cuff Size: Adult)   Pulse 60   Ht 1.6 m (5' 3\")   Wt 83.8 kg (184 lb 12.8 oz)   BMI 32.74 kg/m²     Physical Exam   Constitutional: She is oriented to person, place, and time. She appears well-developed and well-nourished. No distress.   HENT:   Head: Normocephalic and atraumatic.   Eyes: No scleral icterus.   Neck:   Unable to visualize JVD   Cardiovascular: Normal rate and regular rhythm.   Pulmonary/Chest: No respiratory distress. She has no wheezes.   Musculoskeletal:         General: Edema (trace to 1+ edema in bilateral lower legs) present.   Neurological: She is alert and oriented to person, place, and time. No cranial nerve deficit.   Psychiatric: Judgment normal.   Vitals reviewed.       TTE 5/8/20  CONCLUSIONS  No prior study is available for comparison.   Mild concentric left ventricular hypertrophy.  Left ventricular ejection fraction is visually estimated to be 65%.  Grade II diastolic dysfunction.  Mild aortic stenosis.  Vmax is 2.8  m/s.  Normal inferior vena cava size and inspiratory collapse.    [facility] 5/8/20  Procedures:  · Insertion of 5/6 FR sheath in the right radial " artery  · right and left coronary arteriograms  · Angioplasty and placement of a 3.0 by 38 mm [place] drug-eluting stent in mid  left anterior descending coronary artery.  · Angioplasty and placement of a 3.5 by 12 mm [place] drug eluting stent in proximal  left anterior descending coronary artery  · Angioplasty and placement of a 2.5 by 8 mm [place] drug eluting stent in proximal  left posterolateral coronary artery.     Final diagnosis:   two vessel coronary artery disease.  Successful percutaneous intervention of left anterior descending and left circumflex coronary artery.     Recommendations: Guideline directed medical therapy and risk factor management     Coronary arteriograms:  Left main: normal  Left anterior descendin% diffuse tandem lesions, long segment proximal to distal LAD. Three major diagonal branches are noted.   Left circumflex: Dominant large left circumflex artery, supplies left posterolateral and posterior descending arteries. Large left posterolateral branch has 99% stenosis in proximal portion.  Right coronary: Small non-dominant RCA, chronically occluded in mid portion,     Assessment:     1. Coronary artery disease involving native coronary artery of native heart without angina pectoris     2. ACS (acute coronary syndrome) (HCC)  amLODIPine (NORVASC) 5 MG Tab   3. High risk medication use  Basic Metabolic Panel   4. Leg swelling  proBrain Natriuretic Peptide, NT   5. Chronic diastolic heart failure (HCC)     6. Essential hypertension     7. Hypercholesteremia         Medical Decision Making:  Today's Assessment / Status / Plan:   Ischemic Heart Disease, Stable  - S/P FELIPE to LAD and LCx  - DAPT: aspirin 81mg for life, brilinta 90mg bid for 1 year, May 2021  - high intensity statin: lipitor 80mg  - beta blocker: coreg 12.5mg bid  - Cardiac rehab: referred    Diastolic heart failure, NYHA class III  - continue lasix 40mg bid with close watch on labs and reduce to 40mg once daily  - check  BNP    Hypokalemia  - inc potassium 20meq BID    Leg swelling  - reduce amlo to 5mg  - continue lasix 40mg BID for now    HTN, controlled  - now with hypotension, med changes as above    Dyslipidemia, uncontrolled  - recheck lipid panel in Aug, add Zetia if not at goal  - goal <70    Collaborating MD: Dr. Euceda

## 2020-05-28 NOTE — TELEPHONE ENCOUNTER
Spoke with patient daughter wanted to switch apt to virtual they could not make it to the office does not feel comfortable coming to the office. Gali had stated that her mom has not seen no prior cardiologist or any cardio testing.

## 2020-06-01 ENCOUNTER — TELEMEDICINE (OUTPATIENT)
Dept: CARDIOLOGY | Facility: MEDICAL CENTER | Age: 80
End: 2020-06-01
Payer: MEDICARE

## 2020-06-01 VITALS
HEIGHT: 63 IN | BODY MASS INDEX: 32.74 KG/M2 | WEIGHT: 184.8 LBS | HEART RATE: 60 BPM | DIASTOLIC BLOOD PRESSURE: 50 MMHG | SYSTOLIC BLOOD PRESSURE: 108 MMHG

## 2020-06-01 DIAGNOSIS — M79.89 LEG SWELLING: ICD-10-CM

## 2020-06-01 DIAGNOSIS — I50.32 CHRONIC DIASTOLIC HEART FAILURE (HCC): ICD-10-CM

## 2020-06-01 DIAGNOSIS — I24.9 ACS (ACUTE CORONARY SYNDROME) (HCC): ICD-10-CM

## 2020-06-01 DIAGNOSIS — E78.00 HYPERCHOLESTEREMIA: ICD-10-CM

## 2020-06-01 DIAGNOSIS — I10 ESSENTIAL HYPERTENSION: ICD-10-CM

## 2020-06-01 DIAGNOSIS — I25.10 CORONARY ARTERY DISEASE INVOLVING NATIVE CORONARY ARTERY OF NATIVE HEART WITHOUT ANGINA PECTORIS: ICD-10-CM

## 2020-06-01 DIAGNOSIS — Z79.899 HIGH RISK MEDICATION USE: ICD-10-CM

## 2020-06-01 PROCEDURE — 99214 OFFICE O/P EST MOD 30 MIN: CPT | Mod: 95,CR | Performed by: PHYSICIAN ASSISTANT

## 2020-06-01 RX ORDER — POTASSIUM CHLORIDE 20 MEQ/1
20 TABLET, EXTENDED RELEASE ORAL 2 TIMES DAILY
Qty: 60 TAB | Refills: 3
Start: 2020-06-01

## 2020-06-01 RX ORDER — AMLODIPINE BESYLATE 5 MG/1
5 TABLET ORAL DAILY
Qty: 90 TAB | Refills: 3 | Status: SHIPPED | OUTPATIENT
Start: 2020-06-01

## 2020-06-01 ASSESSMENT — ENCOUNTER SYMPTOMS
FALLS: 0
ORTHOPNEA: 1
SHORTNESS OF BREATH: 0
PALPITATIONS: 0
WEIGHT LOSS: 1
COUGH: 0
DIZZINESS: 1

## 2020-06-01 ASSESSMENT — FIBROSIS 4 INDEX: FIB4 SCORE: 2.14

## 2020-06-16 ENCOUNTER — TELEPHONE (OUTPATIENT)
Dept: CARDIOLOGY | Facility: MEDICAL CENTER | Age: 80
End: 2020-06-16

## 2020-06-16 NOTE — TELEPHONE ENCOUNTER
Spoke with patient and patient's daughter states that they never received a lab order from us. Patient Daughters gave me [employer] Phone number [phone] so I could try to get of hold of a Nurse so we could fax lab order before Nurse stops by to draw her labs. LVM to return my call needing Fax number to [email or web address].

## 2020-06-22 ENCOUNTER — TELEPHONE (OUTPATIENT)
Dept: CARDIOLOGY | Facility: MEDICAL CENTER | Age: 80
End: 2020-06-22

## 2020-06-26 ENCOUNTER — TELEPHONE (OUTPATIENT)
Dept: CARDIOLOGY | Facility: MEDICAL CENTER | Age: 80
End: 2020-06-26

## 2020-06-26 NOTE — TELEPHONE ENCOUNTER
Ana was notified that labs needed to be drawn but she was unaware of it until recently. Asked if labs were still pending. Advised Ana that results are in and no new labs are needed at this time.    Ana states as an FYI that 911 Monday evening - she was experiencing SOB. Cardiac event ruled out, physician ruled event as an anxiety attack.    Ana verbalized appreciation for the return phone call.

## 2020-09-08 ENCOUNTER — TELEMEDICINE (OUTPATIENT)
Dept: CARDIOLOGY | Facility: MEDICAL CENTER | Age: 80
End: 2020-09-08
Payer: MEDICARE

## 2020-09-08 VITALS
WEIGHT: 180 LBS | SYSTOLIC BLOOD PRESSURE: 121 MMHG | BODY MASS INDEX: 31.89 KG/M2 | HEIGHT: 63 IN | DIASTOLIC BLOOD PRESSURE: 63 MMHG

## 2020-09-08 DIAGNOSIS — E78.5 DYSLIPIDEMIA: ICD-10-CM

## 2020-09-08 PROCEDURE — 99214 OFFICE O/P EST MOD 30 MIN: CPT | Mod: 95,CR | Performed by: INTERNAL MEDICINE

## 2020-09-08 ASSESSMENT — ENCOUNTER SYMPTOMS
NAUSEA: 0
BRUISES/BLEEDS EASILY: 0
WEAKNESS: 0
CARDIOVASCULAR NEGATIVE: 1
FOCAL WEAKNESS: 0
WEIGHT LOSS: 0
SHORTNESS OF BREATH: 0
EYES NEGATIVE: 1
DIZZINESS: 0
PALPITATIONS: 0
MYALGIAS: 0
PSYCHIATRIC NEGATIVE: 1
GASTROINTESTINAL NEGATIVE: 1
HEADACHES: 0
NERVOUS/ANXIOUS: 0
DOUBLE VISION: 0
DEPRESSION: 0
VOMITING: 0
COUGH: 0
ABDOMINAL PAIN: 0
NEUROLOGICAL NEGATIVE: 1
CONSTITUTIONAL NEGATIVE: 1
CHILLS: 0
RESPIRATORY NEGATIVE: 1
MUSCULOSKELETAL NEGATIVE: 1
FEVER: 0
CLAUDICATION: 0
BLURRED VISION: 0

## 2020-09-08 ASSESSMENT — FIBROSIS 4 INDEX: FIB4 SCORE: 2.14

## 2020-09-08 NOTE — PROGRESS NOTES
Chief Complaint   Patient presents with   • Hypertension       Subjective:   Laura Boyce is a 79 y.o. female who presents today for [facility] follow up    Since the discharge from [facility] on 05/26/20 for acute coronary syndrome, she has been doing well. She denies chest pain, shortness of breath, palpitations, nausea/vomiting or diaphoresis.    Past Medical History:   Diagnosis Date   • Gout    • Heart attack (HCC)     [id number]   • High blood pressure    • History of cholecystectomy    • Hypercholesteremia    • Hypothyroidism    • Polio     as a kid     Past Surgical History:   Procedure Laterality Date   • HYSTERECTOMY ROBOTIC  5/1/2015    Performed by Chuck Palmer M.D. at SURGERY [facility]S   • OOPHORECTOMY  5/1/2015    Performed by Chuck Palmer M.D. at SURGERY [facility]     No family history on file.  Social History     Socioeconomic History   • Marital status:      Spouse name: Not on file   • Number of children: Not on file   • Years of education: Not on file   • Highest education level: Not on file   Occupational History   • Not on file   Social Needs   • Financial resource strain: Not on file   • Food insecurity     Worry: Not on file     Inability: Not on file   • Transportation needs     Medical: Not on file     Non-medical: Not on file   Tobacco Use   • Smoking status: Never Smoker   • Smokeless tobacco: Never Used   Substance and Sexual Activity   • Alcohol use: Not Currently   • Drug use: Never   • Sexual activity: Not on file   Lifestyle   • Physical activity     Days per week: Not on file     Minutes per session: Not on file   • Stress: Not on file   Relationships   • Social connections     Talks on phone: Not on file     Gets together: Not on file     Attends Islam service: Not on file     Active member of club or organization: Not on file     Attends meetings of clubs or organizations: Not on file     Relationship status: Not on file   • Intimate partner  violence     Fear of current or ex partner: Not on file     Emotionally abused: Not on file     Physically abused: Not on file     Forced sexual activity: Not on file   Other Topics Concern   • Not on file   Social History Narrative   • Not on file     Allergies   Allergen Reactions   • Allopurinol      Rash, swollen   • Colchicine Rash     (Medications reviewed.)  Outpatient Encounter Medications as of 9/8/2020   Medication Sig Dispense Refill   • amLODIPine (NORVASC) 5 MG Tab Take 1 Tab by mouth every day. 90 Tab 3   • potassium chloride SA (KDUR) 20 MEQ Tab CR Take 1 Tab by mouth 2 times a day. 60 Tab 3   • aspirin EC (ECOTRIN) 81 MG Tablet Delayed Response Take 1 Tab by mouth every day. 100 Tab 2   • atorvastatin (LIPITOR) 80 MG tablet Take 1 Tab by mouth every bedtime. 90 Tab 2   • carvedilol (COREG) 12.5 MG Tab Take 1 Tab by mouth 2 times a day, with meals. 180 Tab 2   • furosemide (LASIX) 40 MG Tab Take 1 Tab by mouth 2 Times a Day. 60 Tab 2   • levothyroxine (SYNTHROID) 75 MCG Tab Take 1 Tab by mouth Every morning on an empty stomach. 90 Tab 2   • losartan (COZAAR) 25 MG Tab Take 3 Tabs by mouth every day. 90 Tab 2   • mirtazapine (REMERON) 15 MG Tab Take 0.5 Tabs by mouth every bedtime. 30 Tab 2   • ticagrelor (BRILINTA) 90 MG Tab tablet Take 1 Tab by mouth 2 Times a Day. 180 Tab 2   • traZODone (DESYREL) 50 MG Tab Take 1 Tab by mouth at bedtime as needed. 30 Tab 2     No facility-administered encounter medications on file as of 9/8/2020.      Review of Systems   Constitutional: Negative.  Negative for chills, fever, malaise/fatigue and weight loss.   HENT: Negative.  Negative for hearing loss.    Eyes: Negative.  Negative for blurred vision and double vision.   Respiratory: Negative.  Negative for cough and shortness of breath.    Cardiovascular: Negative.  Negative for chest pain, palpitations, claudication and leg swelling.   Gastrointestinal: Negative.  Negative for abdominal pain, nausea and vomiting.  "  Genitourinary: Negative.  Negative for dysuria and urgency.   Musculoskeletal: Negative.  Negative for joint pain and myalgias.   Skin: Negative.  Negative for itching and rash.   Neurological: Negative.  Negative for dizziness, focal weakness, weakness and headaches.   Endo/Heme/Allergies: Negative.  Does not bruise/bleed easily.   Psychiatric/Behavioral: Negative.  Negative for depression. The patient is not nervous/anxious.         Objective:   /63   Ht 1.6 m (5' 3\")   Wt 81.6 kg (180 lb)   BMI 31.89 kg/m²     Physical Exam   Constitutional: She is oriented to person, place, and time. She appears well-developed and well-nourished.   HENT:   Head: Normocephalic and atraumatic.   Eyes: Conjunctivae are normal.   Neck: Normal range of motion.   Musculoskeletal: Normal range of motion.   Neurological: She is alert and oriented to person, place, and time.   Psychiatric: She has a normal mood and affect.     CARDIAC STUDIES/PROCEDURES:     CARDIAC CATHETERIZATION CONCLUSIONS by Russell Gallo (05/08/20)  Angioplasty and placement of a 3.0 by 38 mm [place] drug-eluting stent in mid  left anterior descending coronary artery.  Angioplasty and placement of a 3.5 by 12 mm Mikal drug eluting stent in proximal  left anterior descending coronary artery.  Angioplasty and placement of a 2.5 by 8 mm Mikal drug eluting stent in proximal  left posterolateral coronary artery.     ECHOCARDIOGRAM CONCLUSIONS (05/08/20)  No prior study is available for comparison.   Mild concentric left ventricular hypertrophy.  Left ventricular ejection fraction is visually estimated to be 65%.  Grade II diastolic dysfunction.  Mild aortic stenosis.  Vmax is 2.8  m/s.  Normal inferior vena cava size and inspiratory collapse.     EKG performed on (05/08/20) EKG shows sinus rhythm.    Laboratory results of (05/08/20) were reviewed. Cholesterol profile of 187/155/44/112 noted.    Assessment:   No diagnosis found.    Medical Decision " Making:  Today's Assessment / Status / Plan:     1. Coronary artery disease with stent placement: She is clinically doing well without recurrence of her angina.  We will continue with current medical care including aspirin, ticagrelor, carvedilol, losartan and atorvastatin.  2. Chronic diastolic congestive heart failure: The overall volume status is adequate.  3. Hypertension: Blood pressure is well controlled. We will continue with amlodipine, beta blockade therapy and angiotensin receptor blockade.  4. Hyperlipidemia: She is doing well on statin therapy without myalgia symptoms.     This evaluation was conducted via Zoom using secure and encrypted videoconferencing technology. The patient was in a private location in the [facility].    The patient's identity was confirmed and verbal consent was obtained for this virtual visit.    We will follow up in three months with labs.    CC Sandie Hernandez

## 2020-09-09 ENCOUNTER — TELEPHONE (OUTPATIENT)
Dept: CARDIOLOGY | Facility: MEDICAL CENTER | Age: 80
End: 2020-09-09

## 2021-12-13 NOTE — THERAPY
Problem: Adult Inpatient Plan of Care  Goal: Plan of Care Review  Outcome: Ongoing, Progressing  Flowsheets (Taken 12/13/2021 1806)  Progress: improving  Plan of Care Reviewed With: patient  Outcome Summary: PT admitted from [facility]. No complaints at this time  Goal: Patient-Specific Goal (Individualized)  Outcome: Ongoing, Progressing  Goal: Absence of Hospital-Acquired Illness or Injury  Outcome: Ongoing, Progressing  Goal: Optimal Comfort and Wellbeing  Outcome: Ongoing, Progressing  Goal: Readiness for Transition of Care  Outcome: Ongoing, Progressing     Problem: Fall Injury Risk  Goal: Absence of Fall and Fall-Related Injury  Outcome: Ongoing, Progressing   Goal Outcome Evaluation:  Plan of Care Reviewed With: patient        Progress: improving  Outcome Summary: PT admitted from [facility]. No complaints at this time   Physical Therapy   Initial Evaluation     Patient Name: Sena Boyce  Age:  79 y.o., Sex:  female  Medical Record #: [id number]  Today's Date: 5/13/2020     Subjective    Pt seated in room, reports pain in RUE and bilateral feet, agreeable to PT.     Objective       05/13/20 0831   Prior Living Situation   Prior Services None   Housing / Facility 2 Story House   Steps Into Home 2  (2 steps and ramp)   Steps In Home   (full flight to basement)   Rail Left Rail  (Steps into Home)   Equipment Owned Front-Wheel Walker;4-Wheel Walker;Single Point Cane   Lives with - Patient's Self Care Capacity Alone and Able to Care For Self   Comments Daughter and friend will be staying with pt at [place]    Prior Level of Functional Mobility   Bed Mobility Independent   Transfer Status Independent   Ambulation Independent   Assistive Devices Used 4-Wheel Walker  (4WW in home, FWW in community)   Stairs Independent   Prior Functioning: Everyday Activities   Self Care Independent   Indoor Mobility (Ambulation) Independent   Stairs Independent   Functional Cognition Independent   Prior Device Use Walker   Vitals   Pulse 65   Patient BP Position Sitting   Blood Pressure  145/68   Pulse Oximetry 94 %   Passive ROM Lower Body   Passive ROM Lower Body WDL   Comments Distal LEs painful to PROM    Active ROM Lower Body    Comments Limited by pain throughout,    Strength Lower Body   Comments Limited by pain throughout    Sensation Lower Body   Comments Painful to light touch, distal > proximal   Balance Assessment   Sitting Balance (Static) Fair -   Sitting Balance (Dynamic) Poor +   Standing Balance (Static) Poor  (in // bars with BUE support )   Weight Shift Sitting Fair   Bed Mobility    Supine to Sit Maximal Assist   Sit to Supine Maximal Assist   Sit to Stand Maximal Assist  (max A with FWW, min A with // bars)   Scooting Minimal Assist   Rolling Minimal Assist to Rt.;Minimum Assist to Lt.   Roll Left and Right   Assistance Needed Physical  assistance   Physical Assistance Level 25%-49%   CARE Score 3   Roll Left and Right Discharge Goal   Discharge Goal Independent   Sit to Lying   Assistance Needed Physical assistance   Physical Assistance Level 75% or more   CARE Score 2   Sit to Lying Discharge Goal   Discharge Goal Independent   Lying to Sitting on Side of Bed   Assistance Needed Physical assistance   Physical Assistance Level 75% or more   CARE Score 2   Lying to Sitting on Side of Bed Discharge Goal   Discharge Goal Independent   Sit to Stand   Assistance Needed Physical assistance   Physical Assistance Level 75% or more   CARE Score 2   Sit to Stand Discharge Goal   Discharge Goal Supervision or touching assistance   Chair/Bed-to-Chair Transfer   Assistance Needed Physical assistance   Physical Assistance Level 75% or more   CARE Score 2   Chair/Bed-to-Chair Transfer Discharge Goal   Discharge Goal Supervision or touching assistance   Car Transfer   Reason if not Attempted Safety concerns   CARE Score 88   Car Transfer Discharge Goal   Discharge Goal Supervision or touching assistance   Walk 10 Feet   Reason if not Attempted Safety concerns   CARE Score 88   Walk 10 Feet Discharge Goal   Discharge Goal Supervision or touching assistance   Walk 50 Feet with Two Turns   Reason if not Attempted Safety concerns   CARE Score 88   Walk 50 Feet with Two Turns Discharge Goal   Discharge Goal Supervision or touching assistance   Walk 150 Feet   Reason if not Attempted Safety concerns   CARE Score 88   Walk 150 Feet Discharge Goal   Discharge Goal Supervision or touching assistance   Walking 10 Feet on Uneven Surfaces   Reason if not Attempted Safety concerns   CARE Score 88   Walking 10 Feet on Uneven Surfaces Discharge Goal   Discharge Goal Supervision or touching assistance   1 Step (Curb)   Reason if not Attempted Safety concerns   CARE Score 88   1 Step (Curb) Discharge Goal   Discharge Goal Supervision or touching assistance   4 Steps   Reason if  not Attempted Safety concerns   CARE Score 88   4 Steps Discharge Goal   Discharge Goal Supervision or touching assistance   12 Steps   Reason if not Attempted Activity not applicable   CARE Score 9   12 Steps Discharge Goal   Discharge Goal Not applicable   Picking Up Object   Reason if not Attempted Safety concerns   CARE Score 88   Picking Up Object Discharge Goal   Discharge Goal Supervision or touching assistance   Wheel 50 Feet with Two Turns   Reason if not Attempted Safety concerns   CARE Score 88   Type of Wheelchair/Scooter Manual   Wheel 50 Feet with Two Turns Discharge Goal   Discharge Goal Independent   Wheel 150 Feet   Reason if not Attempted Safety concerns   CARE Score 88   Type of Wheelchair/Scooter Manual   Wheel 150 Feet Discharge Goal   Discharge Goal Independent   Gait Functional Level of Assist    Gait Level Of Assist Unable to Participate   Wheelchair Functional Level of Assist   Wheelchair Assist Minimal Assist   Distance Wheelchair (Feet or Distance) 15   Wheelchair Description Assistance with steering;Extra time   Stairs Functional Level of Assist   Level of Assist with Stairs Unable to Participate   Transfer Functional Level of Assist   Bed, Chair, Wheelchair Transfer Maximal Assist   Bed Chair Wheelchair Transfer Description Adaptive equipment;Increased time;Set-up of equipment;Assist with two limbs  (lateral scoot method )   Problem List    Problems Pain;Impaired Bed Mobility;Impaired Transfers;Impaired Ambulation;Decreased Activity Tolerance  (Anxiety)   Precautions   Precautions Fall Risk   Current Discharge Plan   Current Discharge Plan Return to Prior Living Situation  (with family and social support )   Interdisciplinary Plan of Care Collaboration   IDT Collaboration with  Occupational Therapist   Patient Position at End of Therapy Seated;Call Light within Reach;Tray Table within Reach   Collaboration Comments discussed CLOF and anxiety limitations with OT   Benefit   Therapy  Benefit Patient Would Benefit from Inpatient Rehabilitation Physical Therapy to Maximize Functional [place] with ADLs, IADLs and Mobility.   PT Total Time Spent   PT Individual Total Time Spent (Mins) 60   PT Charge Group   PT Therapeutic Activities 1   PT Evaluation PT Evaluation Mod       Assessment  Patient is 79 y.o. female with a diagnosis of Acute Coronary Syndrome s/p L heart catheterization and TTE.  Additional factors influencing patient status / progress (ie: cognitive factors, co-morbidities, social support, etc): high anxiety, bilateral foot pain/ decreased ROM, PMH includes MI, cancer, and post polio, good family/ social support, mod I PLOF.      Plan  Recommend Physical Therapy 30-60 minutes per day 5-7 days per week for 2 weeks for the following treatments:  PT Group Therapy, PT E Stim Attended, PT Gait Training, PT Therapeutic Exercises, PT Neuro Re-Ed/Balance, PT Aquatic Therapy, PT Therapeutic Activity and PT Manual Therapy.    Goals:  Long term and short term goals have been discussed with patient and they are in agreement.    Physical Therapy Problems     Problem: Balance     Dates: Start: 05/13/20       Goal: STG-Within one week, patient will maintain static standing     Dates: Start: 05/13/20       Description: 1) Individualized goal:  3 min in // bars with min A   2) Interventions:  PT Group Therapy, PT E Stim Attended, PT Gait Training, PT Therapeutic Exercises, PT Neuro Re-Ed/Balance, PT Therapeutic Activity, and PT Manual Therapy                  Problem: Mobility     Dates: Start: 05/13/20       Description:       Goal: STG-Within one week, patient will propel wheelchair community     Dates: Start: 05/13/20       Description: 1) Individualized goal:  50 ft using UEs and/or LEs, SBA   2) Interventions: PT Group Therapy, PT E Stim Attended, PT Gait Training, PT Therapeutic Exercises, PT Neuro Re-Ed/Balance, PT Therapeutic Activity, and PT Manual Therapy                Goal: STG-Within  one week, patient will ambulate household distance     Dates: Start: 05/13/20       Description: 1) Individualized goal:  10 ft in // bars with mod A   2) Interventions: PT Group Therapy, PT E Stim Attended, PT Gait Training, PT Therapeutic Exercises, PT Neuro Re-Ed/Balance, PT Therapeutic Activity, and PT Manual Therapy                      Problem: Mobility Transfers     Dates: Start: 05/13/20       Goal: STG-Within one week, patient will transfer bed to chair     Dates: Start: 05/13/20       Description: 1) Individualized goal:  mod A SPT or squat pivot with AD as needed   2) Interventions: PT Group Therapy, PT E Stim Attended, PT Gait Training, PT Therapeutic Exercises, PT Neuro Re-Ed/Balance, PT Therapeutic Activity, and PT Manual Therapy                      Problem: PT-Long Term Goals     Dates: Start: 05/13/20       Goal: LTG-By discharge, patient will ambulate     Dates: Start: 05/13/20       Description: 150 ft with LRAD, spv           Goal: LTG-By discharge, patient will transfer one surface to another     Dates: Start: 05/13/20       Description: Spv with LRAD           Goal: LTG-By discharge, patient will ambulate up/down 4-6 stairs     Dates: Start: 05/13/20       Description: 1) Individualized goal:  2 steps with single HR and CGA   2) Interventions: PT Group Therapy, PT E Stim Attended, PT Gait Training, PT Therapeutic Exercises, PT Neuro Re-Ed/Balance, PT Therapeutic Activity, and PT Manual Therapy                Goal: LTG-By discharge, patient will transfer in/out of a car     Dates: Start: 05/13/20       Description: 1) Individualized goal:  Spv with LRAD   2) Interventions: PT Group Therapy, PT E Stim Attended, PT Gait Training, PT Therapeutic Exercises, PT Neuro Re-Ed/Balance, PT Therapeutic Activity, and PT Manual Therapy

## 2022-01-07 NOTE — CARE PLAN
Problem: Safety  Goal: Will remain free from injury  Outcome: PROGRESSING AS EXPECTED     Problem: Venous Thromboembolism (VTW)/Deep Vein Thrombosis (DVT) Prevention:  Goal: Patient will participate in Venous Thrombosis (VTE)/Deep Vein Thrombosis (DVT)Prevention Measures  Outcome: PROGRESSING AS EXPECTED      no

## 2022-02-18 NOTE — TELEPHONE ENCOUNTER
HOSPITALIST PROGRESS NOTE:     Date of service: 8/29/2020    Medical Problems/ Reason for Visit:     Alcohol withdrawal syndrome   Severe Alcohol dependence   Hypokalemia and Hypomagnesemia   Alcoholic Hepatitis   Chronic Anxiety and Depression   Mechanical fall with Acute traumatic left foot Multiple Metatarsal Bones fracture, Closed.       Subjective :   No new complaints, her CIWA scores are low as per discussion with nursing staff.   She is participating with PT/OT.       Objective:      Vital 24 Hour Range Last Value   Temperature Temp  Min: 98 °F (36.7 °C)  Max: 98.5 °F (36.9 °C) 98.5 °F (36.9 °C) (08/29/20 0751)   Pulse Pulse  Min: 68  Max: 81 69 (08/29/20 0751)   Respiratory Resp  Min: 18  Max: 18 18 (08/29/20 0751)   Non-Invasive  Blood Pressure BP  Min: 89/51  Max: 104/62 95/63 (08/29/20 0751)   Pulse Oximetry SpO2  Min: 96 %  Max: 100 % 98 % (08/29/20 0751)     Admit Weight:   Weight: 68.6 kg (08/25/20 2300)    Weight over the past 48 Hours:  No data found.     BMI:   BMI (Calculated): 24.78 (08/25/20 2318)    Intake/Output:    Last Stool Occurrence: 1 (08/26/20 1100)    I/O this shift:  In: 720 [P.O.:720]  Out: -     I/O last 3 completed shifts:  In: 900 [P.O.:900]  Out: -     Physical Exam:     GENERAL: Alert, awake. No distress.   RESPIRATORY: No respiratory distress. No wheezes. No crackles.   HEART: Regular rate and rhythm. No murmur,  No pedal edema.  ABDOMEN: Nondistended, soft and nontender. No hepatosplenomegaly.   MUSCULOSKELETAL: Left Foot has CAM Boot in place.   NEUROLOGIC: No gross motor deficits noted in all four extremities. Face symmetrical. Normal speech. Normal eye movements.   PSYCHIATRY: Oriented to time, place and person. Anxious affect.     Laboratory Results:    Recent Labs   Lab 08/28/20  0645 08/27/20  0650 08/26/20  0619  08/25/20 2027   SODIUM 139 139 139  --  135   POTASSIUM 4.0 3.9 3.4  --  3.4   CHLORIDE 106 105 106  --  103   CO2 28 29 29  --  25   BUN 12 5* 3*  --  8  Today 6/22/2020 I called patient starting at 3:55PM up to41:12PM a total of 8 times leaving messages about her appointment with Shea Greer.     CREATININE 0.41* 0.36* 0.33*   < > 0.34*   GLUCOSE 86 84 90  --  119*   WBC 3.2* 3.7* 3.7*  --  4.9   HGB 10.8* 10.8* 11.0*  --  11.2*   HCT 32.4* 32.2* 32.4*  --  32.5*    144 144  --  155   PT  --   --   --   --  11.5   INR  --   --   --   --  1.1   PTT  --   --   --   --  26    < > = values in this interval not displayed.       Medications/Infusions:  Scheduled:   • chlordiazePOXIDE  25 mg Oral Q12H   • pantoprazole  40 mg Oral Nightly   • lamoTRIgine  25 mg Oral Daily   • escitalopram  10 mg Oral Daily   • folic acid  1 mg Oral Daily   • vitamin - therapeutic multivitamins w/minerals  1 tablet Oral Daily   • potassium CHLORIDE  20 mEq Oral Daily   • thiamine  100 mg Oral Daily   • traZODone  50 mg Oral Nightly   • nicotine  1 patch Transdermal Daily   • sodium chloride (PF)  2 mL Intracatheter Q12H FELY   • enoxaparin  40 mg Subcutaneous Daily   • gabapentin  300 mg Oral TID       Continuous Infusions:     Assessment and Plan:    Alcohol withdrawal syndrome   Severe Alcohol dependence   Continue on CIWA Protocol , Taper Librium.   Continue on Thiamine and Folic acid supplement.   Counseled for cessation of alcohol.   Seen by Psychiatry and has plan of Intensive out patient alcohol rehabilitation.     Hypokalemia and Hypomagnesemia   Better with supplement.     Alcoholic Hepatitis   Ultrasound of the liver is a history of alcoholic liver disease, Viral hepatitis panel is negative.   Counseled patient for abstinence from alcohol  Liver enzymes stable range today. Repeat LFTs tomorrow.   Seen by GI, recommended alcohol abstinence and out patient follow up with GI.     Chronic Anxiety and Depression   Continued on  regimen of Lexapro , trazodone and Lamotrigine as per recommendations from Psychiatry.     Mechanical fall with Acute traumatic left foot Multiple Metatarsal Bones fracture, Closed.   Seen by Orthopedic, recommended non surgical treatment with CAM Boot.   Continue with PT/OT , her ambulation level  To continue to be independent is getting better. Anticipate she will be going Home with Home therapy on discharge.   -------------------------------------------------------------------------------------------------------------------  Care Plan discussed with and/or Notes reviewed from :  Patient , RN , Consultants.     Disposition : Anticipate Home tomorrow with Home therapy pending clinical course.     Code status: Full Resuscitation      Signed:  Vonda Burnett MD  8/29/2020  10:11 AM

## 2022-11-17 NOTE — THERAPY
"Occupational Therapy  Daily Treatment     Patient Name: Sena Boyce  Age:  79 y.o., Sex:  female  Medical Record #: [id number]  Today's Date: 5/16/2020     Precautions  Precautions: Fall Risk    Safety   ADL Safety : Requires Supervision for Safety, Requires Physical Assist for Safety    Subjective    \"Was she still a Max Assist?... Well write that in the chart. They told me a max assist yesterday.\" OTR educated family that pt was a mas assist yesterday in the hospital bed, but a min a in queen bed with railing.     Objective       05/16/20 1301   Sleep/Wake Cycle   Sleep & Rest Awake   Functional Level of Assist   Bed, Chair, Wheelchair Transfer Minimal Assist  (Queen Sized Bed)   Bed Chair Wheelchair Transfer Description Adaptive equipment;Increased time  (bed rail; FWW; Leg )   Sitting Lower Body Exercises   Nustep Resistance Level 1  (8 minutes 30 seconds)   Balance   Sitting Balance (Static) Fair   Sitting Balance (Dynamic) Fair   Standing Balance (Static) Poor +   Standing Balance (Dynamic) Poor +   Bed Mobility    Supine to Sit Contact Guard Assist  (in Queen bed with bed rail)   Sit to Supine Contact Guard Assist  (in Queen bed with bed rail and leg )   Interdisciplinary Plan of Care Collaboration   IDT Collaboration with  Family / Caregiver   Patient Position at End of Therapy Tray Table within Reach;Other (Comments);Self Releasing Lap Belt Applied  (CNA checking vitals)   Collaboration Comments daughter outside window at end of session; CNA hand off of care   OT Total Time Spent   OT Individual Total Time Spent (Mins) 60   OT Charge Group   OT Self Care / ADL 2   OT Therapeutic Exercise  2     Educated on use of leg  and bed rail for non-hospital bed.    Educated pt on reasons why a non-hospital bed might actually be better for her.    Assessment    Pt with anxious affect. Pt is scared of transfers. Pt startled by NuStep seating. Pt needing significant encouragement to do bed transfer, " however was able to complete it with min A to [facility] using leg . Needing lengthy rest breaks throughout bed transfer. Pt described her routine for getting in to bed before hospitalization. It sounds like it was taking her at least 10 minutes to get in to bed due to needing rest breaks. Pt hyperverbose regarding home setup. Pt's limiters continue to be pain, anxiety and endurance.     Plan    ADLs, standing activity tolerance, functional mobility, endurance, balance (expected d/c date 5-)    Occupational Therapy Goals     Problem: Dressing     Dates: Start: 05/13/20       Goal: STG-Within one week, patient will dress UB     Dates: Start: 05/13/20       Description: 1) Individualized Goal:  with supervision  2) Interventions:  OT Group Therapy, OT Self Care/ADL, OT Community Reintegration, OT Manual Ther Technique, OT Neuro Re-Ed/Balance, OT Sensory Int Techniques, OT Therapeutic Activity, OT Evaluation, and OT Therapeutic Exercise      Note:     Goal Note filed on 05/14/20 1127 by Jeanette Figueroa MS,OTR/L    Alessandro completed 5/13                  Goal: STG-Within one week, patient will dress LB     Dates: Start: 05/13/20       Description: 1) Individualized Goal:  with moderate assistance and AE  2) Interventions:  OT Group Therapy, OT Self Care/ADL, OT Community Reintegration, OT Manual Ther Technique, OT Neuro Re-Ed/Balance, OT Sensory Int Techniques, OT Therapeutic Activity, OT Evaluation, and OT Therapeutic Exercise      Note:     Goal Note filed on 05/14/20 1127 by Jeanette Figueroa MS,OTR/L    Alessandro completed 5/13                        Problem: Functional Transfers     Dates: Start: 05/13/20       Goal: STG-Within one week, patient will transfer to toilet     Dates: Start: 05/13/20       Description: 1) Individualized Goal:  with min A  2) Interventions:  OT Group Therapy, OT Self Care/ADL, OT Community Reintegration, OT Manual Ther Technique, OT Neuro Re-Ed/Balance, OT Sensory Int Techniques, OT  Therapeutic Activity, OT Evaluation, and OT Therapeutic Exercise      Note:     Goal Note filed on 05/14/20 1127 by Jeanette Figueroa MS,OTR/L    Alessandro completed 5/13                        Problem: OT Long Term Goals     Dates: Start: 05/13/20       Goal: LTG-By discharge, patient will complete basic self care tasks     Dates: Start: 05/13/20       Description: 1) Individualized Goal:  with Supervision  2) Interventions:  OT Group Therapy, OT Self Care/ADL, OT Community Reintegration, OT Manual Ther Technique, OT Neuro Re-Ed/Balance, OT Sensory Int Techniques, OT Therapeutic Activity, OT Evaluation, and OT Therapeutic Exercise            Goal: LTG-By discharge, patient will perform bathroom transfers     Dates: Start: 05/13/20       Description: 1) Individualized Goal:  with supervision  2) Interventions:  OT Group Therapy, OT Self Care/ADL, OT Community Reintegration, OT Manual Ther Technique, OT Neuro Re-Ed/Balance, OT Sensory Int Techniques, OT Therapeutic Activity, OT Evaluation, and OT Therapeutic Exercise                  Problem: Toileting     Dates: Start: 05/13/20       Description: 1) Individualized Goal:  with Min A  2) Interventions:  OT Group Therapy, OT Self Care/ADL, OT Community Reintegration, OT Manual Ther Technique, OT Neuro Re-Ed/Balance, OT Sensory Int Techniques, OT Therapeutic Activity, OT Evaluation, and OT Therapeutic Exercise      Goal: STG-Within one week, patient will complete toileting tasks     Dates: Start: 05/13/20       Description: 1) Individualized Goal:  with min A  2) Interventions:  OT Group Therapy, OT Self Care/ADL, OT Community Reintegration, OT Manual Ther Technique, OT Neuro Re-Ed/Balance, OT Sensory Int Techniques, OT Therapeutic Activity, OT Evaluation, and OT Therapeutic Exercise      Note:     Goal Note filed on 05/14/20 1127 by Jeanette Figueroa MS,OTR/L    Alessandro completed 5/13                             done

## 2025-07-07 NOTE — PROGRESS NOTES
Hospital Follow Up    LOS:  LOS: 4 days   Patient Name: Shani Phillip  Age/Sex: 74 y.o. female  : 1950  MRN: [id number]    Day of Service: 25   Length of Stay: 4  Encounter Provider: Trev Walter MD  Place of Service: [facility] CARDIOLOGY  Patient Care Team:  Rodríguez Walker MD as PCP - General (Internal Medicine)  Mike Atkins MD as Surgeon (General Surgery)  Hayes Briones MD as Surgeon (Cardiothoracic Surgery)  Zenia Tinajero MD as Consulting Physician (Cardiology)  Clover Zamora MD as Consulting Physician (Pulmonary Disease)  Angy Smith APRN as Nurse Practitioner (Cardiology)  Lester Garcia RN as Ambulatory  (Population Health)    Subjective:     Chief Complaint: Hip fracture    Interval History: A bit confused but able to answer questions.  Requires 65 L supplemental oxygen through high flow nasal cannula.  Able to be weaned off levo drip, in A-fib with rate 90-1 100s on telemetry.      Objective:     Objective:  Temp:  [97 °F (36.1 °C)-98.4 °F (36.9 °C)] 98.4 °F (36.9 °C)  Heart Rate:  [] 93  Resp:  [12-22] 14  BP: (106-146)/() 145/52  Arterial Line BP: (100-148)/(41-67) 129/64     Intake/Output Summary (Last 24 hours) at 2025 0635  Last data filed at 2025 0600  Gross per 24 hour   Intake 1060 ml   Output 835 ml   Net 225 ml     Body mass index is 28.08 kg/m².      25  2040 25  0600 25  0509   Weight: 64.6 kg (142 lb 6.7 oz) 62.3 kg (137 lb 5.6 oz) 67.4 kg (148 lb 9.4 oz)     Weight change:       Physical Exam:   General : Alert, cooperative, in no acute distress.  Frail and chronic ill-appearing.  Neuro: Alert,cooperative and oriented.  Lungs:, Normal work of breathing, on high flow  CV: Irregular rhythm, normal rate, normal S1 and S2, no murmurs, gallops or rubs.  ABD: Soft, nontender, nondistended. Positive bowel sounds.  Extr: No edema or cyanosis, moves all extremities.  Some  Monitor Summary     SB/NSR  55-69  (R) PVC, (O) PAC  0.16/0.08/0.4   "desquamation and cyanosis on lower EXTR    Lab Review:   Results from last 7 days   Lab Units 07/06/25  0817 07/05/25  0324   SODIUM mmol/L 137 135*   POTASSIUM mmol/L 4.9 5.4*   CHLORIDE mmol/L 103 100   CO2 mmol/L 21.0* 20.8*   BUN mg/dL 49.0* 41.0*   CREATININE mg/dL 2.74* 3.32*   GLUCOSE mg/dL 81 157*   CALCIUM mg/dL 8.4* 8.2*   AST (SGOT) U/L 31 28   ALT (SGPT) U/L 11 19     Results from last 7 days   Lab Units 07/04/25  1648 07/04/25  1435   HSTROP T ng/L 69* 73*     Results from last 7 days   Lab Units 07/06/25  0400 07/05/25  0324   WBC 10*3/mm3 12.51* 20.40*   HEMOGLOBIN g/dL 10.0* 11.8*   HEMATOCRIT % 31.1* 36.5   PLATELETS 10*3/mm3 192 340     Results from last 7 days   Lab Units 07/06/25  0400 07/05/25  0324   INR  2.09* 1.81*     Results from last 7 days   Lab Units 07/06/25  0817   MAGNESIUM mg/dL 2.5*           Invalid input(s): \"LDLCALC\"  Results from last 7 days   Lab Units 07/06/25  0817   PROBNP pg/mL 16,731.0*         I reviewed the patient's new clinical results.  I personally viewed and interpreted the patient's EKG  Current Medications:   Scheduled Meds:arformoterol, 15 mcg, Nebulization, BID - RT  budesonide, 0.5 mg, Nebulization, BID - RT  buPROPion XL, 150 mg, Oral, Daily  cefTRIAXone, 1,000 mg, Intravenous, Q24H  digoxin, 125 mcg, Oral, Every Other Day  famotidine, 20 mg, Oral, BID AC  insulin lispro, 2-7 Units, Subcutaneous, 4x Daily AC & at Bedtime  ipratropium-albuterol, 3 mL, Nebulization, 4x Daily - RT  levothyroxine, 50 mcg, Oral, Q AM  melatonin, 2.5 mg, Oral, Nightly  metoprolol tartrate, 12.5 mg, Oral, Q12H  pramipexole, 0.5 mg, Oral, BID  warfarin, 0.5 mg, Oral, Daily      Continuous Infusions:Pharmacy to dose warfarin,         Allergies:  Allergies   Allergen Reactions    Penicillins Rash     RASH 30 YRS AGO NO HOSPITALIZATION       Assessment:   Left femur fracture status post operative fixation  Atrial fibrillation with RVR  Chronic diastolic heart failure  Mild aortic valve " stenosis  Moderate to severe mitral valve stenosis  JAIME on CKD  COPD    Plan:      - POD #2 of hip surgery (left intramedullary nailing of intertrochanteric hip fracture by Dr. Schofield)    -Still requires significant amount of oxygen support (65 L via nasal cannula)    - Known history of atrial fibrillation, rate reasonably controlled in the 90-100s, continue digoxin 125 mcg every other day.  Dig level has been normal.    - Started on low-dose metoprolol 12.5 bid to help with rate control, tolerating thus far.  [id number] score 5, she is on warfarin.    - Echo earlier this year 2/2025 showed LVEF greater than 70%, severe mitral annular calcification (MAC) with atypical appearing anterior leaflet echodensity as well as moderate to severe functional MS, also mild AS, moderate pulmonary hypertension.  She appears grossly compensated on exam, rate control as above, no scheduled diuretic, daily volume assessment.    - Significant coronary calcification on CTA chest from 12/2019 upon independent image review.  Lipids pending, will initiate Crestor 10 daily.    Trev Walter MD  07/07/25  06:35 EDT